# Patient Record
Sex: MALE
[De-identification: names, ages, dates, MRNs, and addresses within clinical notes are randomized per-mention and may not be internally consistent; named-entity substitution may affect disease eponyms.]

---

## 2020-04-14 ENCOUNTER — HOSPITAL ENCOUNTER (INPATIENT)
Dept: HOSPITAL 72 - EMR | Age: 85
LOS: 15 days | Discharge: NURSING, REHAB OR LONG-TERM CARE | DRG: 177 | End: 2020-04-29
Payer: MEDICARE

## 2020-04-14 VITALS — HEIGHT: 65 IN | BODY MASS INDEX: 25.09 KG/M2 | WEIGHT: 150.58 LBS

## 2020-04-14 VITALS — DIASTOLIC BLOOD PRESSURE: 67 MMHG | SYSTOLIC BLOOD PRESSURE: 146 MMHG

## 2020-04-14 VITALS — SYSTOLIC BLOOD PRESSURE: 132 MMHG | DIASTOLIC BLOOD PRESSURE: 70 MMHG

## 2020-04-14 VITALS — SYSTOLIC BLOOD PRESSURE: 138 MMHG | DIASTOLIC BLOOD PRESSURE: 78 MMHG

## 2020-04-14 VITALS — SYSTOLIC BLOOD PRESSURE: 140 MMHG | DIASTOLIC BLOOD PRESSURE: 73 MMHG

## 2020-04-14 VITALS — SYSTOLIC BLOOD PRESSURE: 128 MMHG | DIASTOLIC BLOOD PRESSURE: 68 MMHG

## 2020-04-14 DIAGNOSIS — R06.02: ICD-10-CM

## 2020-04-14 DIAGNOSIS — J44.9: ICD-10-CM

## 2020-04-14 DIAGNOSIS — U07.1: Primary | ICD-10-CM

## 2020-04-14 DIAGNOSIS — N18.6: ICD-10-CM

## 2020-04-14 DIAGNOSIS — E11.9: ICD-10-CM

## 2020-04-14 DIAGNOSIS — D63.1: ICD-10-CM

## 2020-04-14 DIAGNOSIS — I12.0: ICD-10-CM

## 2020-04-14 DIAGNOSIS — M19.90: ICD-10-CM

## 2020-04-14 DIAGNOSIS — N18.9: ICD-10-CM

## 2020-04-14 DIAGNOSIS — E87.5: ICD-10-CM

## 2020-04-14 DIAGNOSIS — I50.32: ICD-10-CM

## 2020-04-14 DIAGNOSIS — Z99.81: ICD-10-CM

## 2020-04-14 DIAGNOSIS — I49.3: ICD-10-CM

## 2020-04-14 DIAGNOSIS — E11.22: ICD-10-CM

## 2020-04-14 LAB
ADD MANUAL DIFF: NO
ALBUMIN SERPL-MCNC: 3.1 G/DL (ref 3.4–5)
ALBUMIN/GLOB SERPL: 0.8 {RATIO} (ref 1–2.7)
ALP SERPL-CCNC: 81 U/L (ref 46–116)
ALT SERPL-CCNC: 42 U/L (ref 12–78)
ANION GAP SERPL CALC-SCNC: 10 MMOL/L (ref 5–15)
AST SERPL-CCNC: 57 U/L (ref 15–37)
BASOPHILS NFR BLD AUTO: 0.4 % (ref 0–2)
BILIRUB SERPL-MCNC: 0.5 MG/DL (ref 0.2–1)
BUN SERPL-MCNC: 34 MG/DL (ref 7–18)
CALCIUM SERPL-MCNC: 9 MG/DL (ref 8.5–10.1)
CHLORIDE SERPL-SCNC: 100 MMOL/L (ref 98–107)
CK MB SERPL-MCNC: 1 NG/ML (ref 0–3.6)
CK SERPL-CCNC: 88 U/L (ref 26–308)
CO2 SERPL-SCNC: 29 MMOL/L (ref 21–32)
CREAT SERPL-MCNC: 6.7 MG/DL (ref 0.55–1.3)
EOSINOPHIL NFR BLD AUTO: 0.2 % (ref 0–3)
ERYTHROCYTE [DISTWIDTH] IN BLOOD BY AUTOMATED COUNT: 13.5 % (ref 11.6–14.8)
GLOBULIN SER-MCNC: 3.8 G/DL
HCT VFR BLD CALC: 30.9 % (ref 42–52)
HGB BLD-MCNC: 10 G/DL (ref 14.2–18)
LYMPHOCYTES NFR BLD AUTO: 13.3 % (ref 20–45)
MCV RBC AUTO: 89 FL (ref 80–99)
MONOCYTES NFR BLD AUTO: 7.1 % (ref 1–10)
NEUTROPHILS NFR BLD AUTO: 79 % (ref 45–75)
PLATELET # BLD: 164 K/UL (ref 150–450)
POTASSIUM SERPL-SCNC: 5.2 MMOL/L (ref 3.5–5.1)
RBC # BLD AUTO: 3.49 M/UL (ref 4.7–6.1)
SODIUM SERPL-SCNC: 139 MMOL/L (ref 136–145)
WBC # BLD AUTO: 7 K/UL (ref 4.8–10.8)

## 2020-04-14 PROCEDURE — 83880 ASSAY OF NATRIURETIC PEPTIDE: CPT

## 2020-04-14 PROCEDURE — 86140 C-REACTIVE PROTEIN: CPT

## 2020-04-14 PROCEDURE — 86706 HEP B SURFACE ANTIBODY: CPT

## 2020-04-14 PROCEDURE — 80048 BASIC METABOLIC PNL TOTAL CA: CPT

## 2020-04-14 PROCEDURE — 82270 OCCULT BLOOD FECES: CPT

## 2020-04-14 PROCEDURE — 80053 COMPREHEN METABOLIC PANEL: CPT

## 2020-04-14 PROCEDURE — 87081 CULTURE SCREEN ONLY: CPT

## 2020-04-14 PROCEDURE — 83550 IRON BINDING TEST: CPT

## 2020-04-14 PROCEDURE — 80061 LIPID PANEL: CPT

## 2020-04-14 PROCEDURE — 82607 VITAMIN B-12: CPT

## 2020-04-14 PROCEDURE — 36600 WITHDRAWAL OF ARTERIAL BLOOD: CPT

## 2020-04-14 PROCEDURE — 99285 EMERGENCY DEPT VISIT HI MDM: CPT

## 2020-04-14 PROCEDURE — 82553 CREATINE MB FRACTION: CPT

## 2020-04-14 PROCEDURE — 82550 ASSAY OF CK (CPK): CPT

## 2020-04-14 PROCEDURE — 85025 COMPLETE CBC W/AUTO DIFF WBC: CPT

## 2020-04-14 PROCEDURE — 85379 FIBRIN DEGRADATION QUANT: CPT

## 2020-04-14 PROCEDURE — 83036 HEMOGLOBIN GLYCOSYLATED A1C: CPT

## 2020-04-14 PROCEDURE — 96375 TX/PRO/DX INJ NEW DRUG ADDON: CPT

## 2020-04-14 PROCEDURE — 36415 COLL VENOUS BLD VENIPUNCTURE: CPT

## 2020-04-14 PROCEDURE — 87635 SARS-COV-2 COVID-19 AMP PRB: CPT

## 2020-04-14 PROCEDURE — 93005 ELECTROCARDIOGRAM TRACING: CPT

## 2020-04-14 PROCEDURE — 82746 ASSAY OF FOLIC ACID SERUM: CPT

## 2020-04-14 PROCEDURE — 71045 X-RAY EXAM CHEST 1 VIEW: CPT

## 2020-04-14 PROCEDURE — 83615 LACTATE (LD) (LDH) ENZYME: CPT

## 2020-04-14 PROCEDURE — 82962 GLUCOSE BLOOD TEST: CPT

## 2020-04-14 PROCEDURE — 84443 ASSAY THYROID STIM HORMONE: CPT

## 2020-04-14 PROCEDURE — 84100 ASSAY OF PHOSPHORUS: CPT

## 2020-04-14 PROCEDURE — 84550 ASSAY OF BLOOD/URIC ACID: CPT

## 2020-04-14 PROCEDURE — 80076 HEPATIC FUNCTION PANEL: CPT

## 2020-04-14 PROCEDURE — 74018 RADEX ABDOMEN 1 VIEW: CPT

## 2020-04-14 PROCEDURE — 82140 ASSAY OF AMMONIA: CPT

## 2020-04-14 PROCEDURE — 96374 THER/PROPH/DIAG INJ IV PUSH: CPT

## 2020-04-14 PROCEDURE — 83605 ASSAY OF LACTIC ACID: CPT

## 2020-04-14 PROCEDURE — 87045 FECES CULTURE AEROBIC BACT: CPT

## 2020-04-14 PROCEDURE — 87324 CLOSTRIDIUM AG IA: CPT

## 2020-04-14 PROCEDURE — 84484 ASSAY OF TROPONIN QUANT: CPT

## 2020-04-14 PROCEDURE — 85007 BL SMEAR W/DIFF WBC COUNT: CPT

## 2020-04-14 PROCEDURE — 82977 ASSAY OF GGT: CPT

## 2020-04-14 PROCEDURE — 82803 BLOOD GASES ANY COMBINATION: CPT

## 2020-04-14 PROCEDURE — 83735 ASSAY OF MAGNESIUM: CPT

## 2020-04-14 PROCEDURE — 82728 ASSAY OF FERRITIN: CPT

## 2020-04-14 PROCEDURE — 83540 ASSAY OF IRON: CPT

## 2020-04-14 PROCEDURE — 87040 BLOOD CULTURE FOR BACTERIA: CPT

## 2020-04-14 NOTE — NUR
ED Nurse Note:

Pt resting in bed, offered food and drink and more blankets, pt denies pain at 
this time, will continue to monitor

## 2020-04-14 NOTE — NUR
RESPIRATORY NOTE:

ABG was not obtained. ER MD notified. results entered was for wrong patient. 
Charge RN notified

## 2020-04-14 NOTE — EMERGENCY ROOM REPORT
History of Present Illness


General


Chief Complaint:  Shortness of breath


Source:  Patient, EMS


 (Kenroy Fields MD)





Present Illness


HPI


Disclaimer: Please note that this report is being documented using DRAGON 

technology. This can lead to erroneous entry secondary to incorrect 

interpretation by the dictating instrument.





HPI: 85-year-old male history of ESRD on hemodialysis, COPD oxygen dependent at 

home presents for evaluation of shortness of breath.  Per EMS, they were called 

by family stated he was having increased work of breathing and shortness of 

breath this morning.  No cough or fevers have been reported.  His son is 

currently in the intensive care unit for COVID-19 infection.  Patient finished 

hemodialysis yesterday after a full treatment.  Denies any swelling.  Currently 

denying any shortness of breath, chest pain, nausea, vomiting, diarrhea.


 


PMH: ESRD, hypertension, COPD


 


PSH: Reviewed


 


Allergies: None reported


 


Social Hx: Denies drug or alcohol use


 (Kenroy Fields MD)


Allergies:  


Coded Allergies:  


     No Known Allergies (Unverified , 4/16/13)





Nursing Documentation-PMH


Hx Cardiac Problems:  No


Hx Hypertension:  Yes


Hx Pacemaker:  No


Hx Asthma:  No


Hx COPD:  No


Hx Diabetes:  Yes


Hx Cancer:  No


Hx Gastrointestinal Problems:  No


Hx Dialysis:  Yes


Hx Neurological Problems:  No


Hx Cerebrovascular Accident:  No


Hx Seizures:  Yes - pt. claims to have had one 35 years ago


Hx Neurologic Surgery:  No


Hx Brain Shunt:  No


 (Kenroy Fields MD)





Review of Systems


All Other Systems:  negative except mentioned in HPI


 (Kenroy Fields MD)





Medical Decision Making


Diagnostic Impression:  


 Primary Impression:  


 Dyspnea


 Additional Impression:  


 ESRD (end stage renal disease)


ER Course


A 5-year-old male history of COPD and ESRD presents for evaluation of shortness 

of breath now resolved.  Differential includes was not limited to viral syndrome

, pneumonia, COVID-19 infection, bronchitis, COPD exacerbation, CHF exacerbation

, fluid overload state, electrolyte abnormality, GERD, ACS, arrhythmia to name 

a few.  Based on the patient's presenting signs, symptoms, recent exposure and 

physical exam findings, the patient has been screened and is suspected to have 

COVID-19.  Will send swabs, obtain blood cultures, x-rays, ABG and broad labs 

to evaluate.


 (Kenroy Fields MD)


ER Course


Please refer to the initial note for the history exam and presentation


At this time patient x-ray does not show any obvious infiltrate there was a 

small marking in the lower lobe





Patient does have renal failure and blood work consistent with that





Patient has had multiple sick contacts with covid-19 given the comorbidities 

and the patient's presentation he requires further inpatient


Care and evaluation





Labs








Test


  4/14/20


13:39


 


White Blood Count


  7.0 K/UL


(4.8-10.8)


 


Red Blood Count


  3.49 M/UL


(4.70-6.10)


 


Hemoglobin


  10.0 G/DL


(14.2-18.0)


 


Hematocrit


  30.9 %


(42.0-52.0)


 


Mean Corpuscular Volume 89 FL (80-99) 


 


Mean Corpuscular Hemoglobin


  28.7 PG


(27.0-31.0)


 


Mean Corpuscular Hemoglobin


Concent 32.4 G/DL


(32.0-36.0)


 


Red Cell Distribution Width


  13.5 %


(11.6-14.8)


 


Platelet Count


  164 K/UL


(150-450)


 


Mean Platelet Volume


  8.7 FL


(6.5-10.1)


 


Neutrophils (%) (Auto)


  79.0 %


(45.0-75.0)


 


Lymphocytes (%) (Auto)


  13.3 %


(20.0-45.0)


 


Monocytes (%) (Auto)


  7.1 %


(1.0-10.0)


 


Eosinophils (%) (Auto)


  0.2 %


(0.0-3.0)


 


Basophils (%) (Auto)


  0.4 %


(0.0-2.0)


 


Sodium Level


  139 MMOL/L


(136-145)


 


Potassium Level


  5.2 MMOL/L


(3.5-5.1)


 


Chloride Level


  100 MMOL/L


()


 


Carbon Dioxide Level


  29 MMOL/L


(21-32)


 


Anion Gap


  10 mmol/L


(5-15)


 


Blood Urea Nitrogen


  34 mg/dL


(7-18)


 


Creatinine


  6.7 MG/DL


(0.55-1.30)


 


Estimat Glomerular Filtration


Rate 8.0 mL/min


(>60)


 


Glucose Level


  137 MG/DL


()


 


Lactic Acid Level


  1.80 mmol/L


(0.4-2.0)


 


Calcium Level


  9.0 MG/DL


(8.5-10.1)


 


Total Bilirubin


  0.5 MG/DL


(0.2-1.0)


 


Aspartate Amino Transf


(AST/SGOT) 57 U/L (15-37) 


 


 


Alanine Aminotransferase


(ALT/SGPT) 42 U/L (12-78) 


 


 


Alkaline Phosphatase


  81 U/L


()


 


Total Creatine Kinase


  88 U/L


()


 


Creatine Kinase MB


  1.0 NG/ML


(0.0-3.6)


 


Creatine Kinase MB Relative


Index 1.1 


 


 


Troponin I


  0.025 ng/mL


(0.000-0.056)


 


Pro-B-Type Natriuretic Peptide


  16742 pg/mL


(0-125)


 


Total Protein


  6.9 G/DL


(6.4-8.2)


 


Albumin


  3.1 G/DL


(3.4-5.0)


 


Globulin 3.8 g/dL 


 


Albumin/Globulin Ratio 0.8 (1.0-2.7) 








 (Van Lopez DO)


EKG Diagnostic Results


EKG Time:  13:49


Rate:  normal


Rhythm:  NSR


Other Impression


Sinus rhythm with left axis deviation and bifascicular block.  No prior 

available for comparison


 (Kenroy Fields MD)





Rhythm Strip Diag. Results


Rhythm Strip Time:  13:49


Rate:  70s


Rhythm:  NSR


 (Kenroy Fields MD)


EP Interpretation:  yes


Rate:  77


Rhythm:  NSR, no PVC's, no ectopy


 (Van Lopez DO)


Chest X-Ray Diagnostic Results


Chest X-Ray Diagnostic Results :  


   Chest X-Ray Ordered:  Yes


   # of Views/Limited/Complete:  1 View


   Indication:  Shortness of Breath


   EP Interpretation:  Yes


   Interpretation:  no consolidation, no effusion, no pneumothorax, other - 

Catheter in left upper chest


   Impression:  No acute disease


   Electronically Signed by:  Electronically signed by Kenroy Fields


 (Kenroy Fields MD)


Chest X-Ray Diagnostic Results :  


   Chest X-Ray Ordered:  Yes


   # of Views/Limited/Complete:  1 View


   Indication:  Shortness of Breath


   EP Interpretation:  Yes


   Interpretation:  no effusion, no pneumothorax, other - Midlung atelectasis


   Impression:  Other - Left midlung atelectasis


   Electronically Signed by:  Van Lopez DO


 (Van Lopez DO)


Reevaluation Time:  14:38


Reevaluation Impression


EKG shows bifascicular block and there is no prior for comparison.  No obvious 

infiltrate on chest x-ray.


 (Kenroy Fields MD)


Status:  improved


 (Van Lopez DO)


Disposition:  ADMITTED AS INPATIENT


Condition:  Serious











Kenroy Fields MD Apr 14, 2020 13:27


Van Lopez DO Apr 14, 2020 15:33

## 2020-04-14 NOTE — NUR
ED Nurse Note:

Dr Rubio notified that pt is unable to provided urine sample, dialysis pt, 
and refuses straight catheter.

## 2020-04-14 NOTE — NUR
ED Nurse Note:

Pt brought in by ambulance for SOB for 2 days. Pt lives at home with son. Son 
is currently in a hospital ICU for COVID. Pt is alert and orientedx4, 02 98% NC 
1L. Pt has fever 100.8F oral. Pt is set up on monitor. Pt has chills and 
nonproductive cough.

## 2020-04-14 NOTE — NUR
ED Nurse Note:

Patient's accu check level 154mg/dl. RN unable to save insulin administration 
without clicking on critical high sugar result.

## 2020-04-14 NOTE — DIAGNOSTIC IMAGING REPORT
Indication: Shortness of breath

 

Technique: One view of the chest

 

Comparison: none

 

Findings: There is a left chest  HERO catheter, tip projected at the level of the

superior vena cava. Some atelectatic bands are seen in the left midlung. The lungs

and pleural spaces are otherwise clear. The heart size is upper limits of normal. A

stent is seen in the left axilla

 

Impression: Left midlung atelectasis. No acute process otherwise

 

Other findings as noted

## 2020-04-15 VITALS — SYSTOLIC BLOOD PRESSURE: 114 MMHG | DIASTOLIC BLOOD PRESSURE: 45 MMHG

## 2020-04-15 VITALS — SYSTOLIC BLOOD PRESSURE: 114 MMHG | DIASTOLIC BLOOD PRESSURE: 49 MMHG

## 2020-04-15 VITALS — DIASTOLIC BLOOD PRESSURE: 56 MMHG | SYSTOLIC BLOOD PRESSURE: 126 MMHG

## 2020-04-15 VITALS — SYSTOLIC BLOOD PRESSURE: 115 MMHG | DIASTOLIC BLOOD PRESSURE: 53 MMHG

## 2020-04-15 VITALS — DIASTOLIC BLOOD PRESSURE: 54 MMHG | SYSTOLIC BLOOD PRESSURE: 125 MMHG

## 2020-04-15 LAB
% IRON SATURATION: 18 % (ref 15–50)
ADD MANUAL DIFF: NO
ALBUMIN SERPL-MCNC: 3 G/DL (ref 3.4–5)
ALBUMIN/GLOB SERPL: 0.8 {RATIO} (ref 1–2.7)
ALP SERPL-CCNC: 82 U/L (ref 46–116)
ALT SERPL-CCNC: 46 U/L (ref 12–78)
AMMONIA PLAS-SCNC: 33 UMOL/L (ref 11–32)
ANION GAP SERPL CALC-SCNC: 12 MMOL/L (ref 5–15)
AST SERPL-CCNC: 39 U/L (ref 15–37)
BASOPHILS NFR BLD AUTO: 0.6 % (ref 0–2)
BILIRUB SERPL-MCNC: 0.7 MG/DL (ref 0.2–1)
BUN SERPL-MCNC: 39 MG/DL (ref 7–18)
CALCIUM SERPL-MCNC: 8.9 MG/DL (ref 8.5–10.1)
CHLORIDE SERPL-SCNC: 101 MMOL/L (ref 98–107)
CHOLEST SERPL-MCNC: 145 MG/DL (ref ?–200)
CO2 SERPL-SCNC: 26 MMOL/L (ref 21–32)
CREAT SERPL-MCNC: 8 MG/DL (ref 0.55–1.3)
EOSINOPHIL NFR BLD AUTO: 0.7 % (ref 0–3)
ERYTHROCYTE [DISTWIDTH] IN BLOOD BY AUTOMATED COUNT: 13.3 % (ref 11.6–14.8)
FERRITIN SERPL-MCNC: > 2000 NG/ML (ref 8–388)
GAMMA GLUTAMYL TRANSPEPTIDASE: 37 U/L (ref 5–85)
GLOBULIN SER-MCNC: 3.9 G/DL
HCT VFR BLD CALC: 32.1 % (ref 42–52)
HDLC SERPL-MCNC: 65 MG/DL (ref 40–60)
HGB BLD-MCNC: 10.5 G/DL (ref 14.2–18)
IRON SERPL-MCNC: 19 UG/DL (ref 50–175)
LYMPHOCYTES NFR BLD AUTO: 16.8 % (ref 20–45)
MCV RBC AUTO: 89 FL (ref 80–99)
MONOCYTES NFR BLD AUTO: 4.4 % (ref 1–10)
NEUTROPHILS NFR BLD AUTO: 77.5 % (ref 45–75)
PHOSPHATE SERPL-MCNC: 3.1 MG/DL (ref 2.5–4.9)
PLATELET # BLD: 180 K/UL (ref 150–450)
POTASSIUM SERPL-SCNC: 4.8 MMOL/L (ref 3.5–5.1)
RBC # BLD AUTO: 3.61 M/UL (ref 4.7–6.1)
SODIUM SERPL-SCNC: 139 MMOL/L (ref 136–145)
TIBC SERPL-MCNC: 108 UG/DL (ref 250–450)
TRIGL SERPL-MCNC: 125 MG/DL (ref 30–150)
UNSATURATED IRON BINDING: 89 UG/DL (ref 112–346)
WBC # BLD AUTO: 6.6 K/UL (ref 4.8–10.8)

## 2020-04-15 PROCEDURE — 5A1D70Z PERFORMANCE OF URINARY FILTRATION, INTERMITTENT, LESS THAN 6 HOURS PER DAY: ICD-10-PCS

## 2020-04-15 RX ADMIN — SEVELAMER CARBONATE SCH MG: 800 POWDER, FOR SUSPENSION ORAL at 17:16

## 2020-04-15 RX ADMIN — Medication SCH TAB: at 09:44

## 2020-04-15 RX ADMIN — SEVELAMER CARBONATE SCH MG: 800 POWDER, FOR SUSPENSION ORAL at 12:14

## 2020-04-15 RX ADMIN — DOCUSATE SODIUM SCH MG: 100 CAPSULE, LIQUID FILLED ORAL at 17:16

## 2020-04-15 RX ADMIN — INSULIN ASPART SCH UNITS: 100 INJECTION, SOLUTION INTRAVENOUS; SUBCUTANEOUS at 16:30

## 2020-04-15 RX ADMIN — SEVELAMER CARBONATE SCH MG: 800 POWDER, FOR SUSPENSION ORAL at 09:00

## 2020-04-15 RX ADMIN — DOCUSATE SODIUM SCH MG: 100 CAPSULE, LIQUID FILLED ORAL at 09:44

## 2020-04-15 RX ADMIN — CINACALCET HYDROCHLORIDE SCH MG: 30 TABLET, COATED ORAL at 09:44

## 2020-04-15 RX ADMIN — INSULIN ASPART SCH UNITS: 100 INJECTION, SOLUTION INTRAVENOUS; SUBCUTANEOUS at 06:30

## 2020-04-15 RX ADMIN — INSULIN ASPART SCH UNITS: 100 INJECTION, SOLUTION INTRAVENOUS; SUBCUTANEOUS at 11:30

## 2020-04-15 RX ADMIN — INSULIN ASPART SCH UNITS: 100 INJECTION, SOLUTION INTRAVENOUS; SUBCUTANEOUS at 21:00

## 2020-04-15 NOTE — NUR
NURSE NOTES:

Recvd report. Pt was transferred from [facility] at change of shift. Pt is awake and alert AOX4, 
Yi speaking. Pt has cardiac monitor on. Pt is on room air with no sign of sob or resp 
distress. IV site is c/d/i.  Pt has left arm fistula. Bed in lowest locked position, call 
light within reach, will continue with plan of care

## 2020-04-15 NOTE — NEPHROLOGY PROGRESS NOTE
Assessment/Plan


Problem List:  


(1) ESRD (end stage renal disease)


(2) Suspected COVID-19 virus infection


Assessment





End-stage renal disease on hemodialysis


Has arm fistula for dialysis-Next dialysis Wednesday, April 15


Dyspnea and shortness of breath, history of COPD, oxygen dependent at home


Exposure to COVID 19


Diabetes mellitus


Hypertension


Remote history of seizure


Anemia of kidney disease


Plan





Hemodialysis today


Keep the blood pressure and blood sugar in check


2D echocardiogram


Antibiotics and pulmonary support per consultants





Chest x-ray:


Findings: There is a left chest  HERO catheter, tip projected at the level of 

the


superior vena cava. Some atelectatic bands are seen in the left midlung. The 

lungs


and pleural spaces are otherwise clear. The heart size is upper limits of 

normal. A


stent is seen in the left axilla


Impression: Left midlung atelectasis. No acute process otherwise





Subjective


ROS Limited/Unobtainable:  No


Constitutional:  Reports: malaise, weakness





Objective


Objective





Last 24 Hour Vital Signs








  Date Time  Temp Pulse Resp B/P (MAP) Pulse Ox O2 Delivery O2 Flow Rate FiO2


 


4/15/20 09:00  65  114/49    


 


4/15/20 08:54      Room Air  


 


4/15/20 08:00 98.4 65 20 114/49 (70) 100   


 


4/15/20 08:00  58      


 


4/15/20 04:00 98.1 61 19 125/54 (77) 96   


 


4/15/20 04:00  60      


 


4/15/20 01:37      Room Air  


 


4/15/20 00:15  61      


 


4/15/20 00:15 99.0 60 14 128/68 97 Room Air  97


 


4/14/20 23:00 99.0 60 14 128/68 97 Room Air  


 


4/14/20 21:00 99.0 58 14 132/70 96 Room Air  


 


4/14/20 18:59 99.9 70 19 138/78 100 Room Air  


 


4/14/20 16:30 99.9 71 16 140/73 99 Room Air  


 


4/14/20 14:18 99.9       


 


4/14/20 13:45  70 18   Room Air  97


 


4/14/20 13:45 100.8 70 18 146/67 97 Room Air  


 


4/14/20 13:14 100.8 68 16 161/68 (99) 95 Room Air  

















Intake and Output  


 


 4/14/20 4/15/20





 19:00 07:00


 


Intake Total 0 ml 


 


Balance 0 ml 


 


  


 


Intake Oral 0 ml 








Laboratory Tests


4/14/20 13:22: 


Arterial Blood pH , Arterial Blood Partial Pressure CO2 , Arterial Blood 

Partial Pressure O2 , Arterial Blood HCO3 , Arterial Blood Oxygen Saturation , 

Arterial Blood Base Excess , Aris Test 


4/14/20 13:39: 


White Blood Count 7.0, Red Blood Count 3.49L, Hemoglobin 10.0L, Hematocrit 30.9L

, Mean Corpuscular Volume 89, Mean Corpuscular Hemoglobin 28.7, Mean 

Corpuscular Hemoglobin Concent 32.4, Red Cell Distribution Width 13.5, Platelet 

Count 164, Mean Platelet Volume 8.7, Neutrophils (%) (Auto) 79.0H, Lymphocytes (

%) (Auto) 13.3L, Monocytes (%) (Auto) 7.1, Eosinophils (%) (Auto) 0.2, 

Basophils (%) (Auto) 0.4, Sodium Level 139, Potassium Level 5.2H, Chloride 

Level 100, Carbon Dioxide Level 29, Anion Gap 10, Blood Urea Nitrogen 34H, 

Creatinine 6.7H, Estimat Glomerular Filtration Rate 8.0, Glucose Level 137H, 

Lactic Acid Level 1.80, Calcium Level 9.0, Total Bilirubin 0.5, Aspartate Amino 

Transf (AST/SGOT) 57H, Alanine Aminotransferase (ALT/SGPT) 42, Alkaline 

Phosphatase 81, Lactate Dehydrogenase 332H, Total Creatine Kinase 88, Creatine 

Kinase MB 1.0, Creatine Kinase MB Relative Index 1.1, Troponin I 0.025, Pro-B-

Type Natriuretic Peptide 94233A, Total Protein 6.9, Albumin 3.1L, Globulin 3.8, 

Albumin/Globulin Ratio 0.8L


4/15/20 08:45: 


White Blood Count 6.6, Red Blood Count 3.61L, Hemoglobin 10.5L, Hematocrit 32.1L

, Mean Corpuscular Volume 89, Mean Corpuscular Hemoglobin 29.0, Mean 

Corpuscular Hemoglobin Concent 32.6, Red Cell Distribution Width 13.3, Platelet 

Count 180, Mean Platelet Volume 8.5, Neutrophils (%) (Auto) 77.5H, Lymphocytes (

%) (Auto) 16.8L, Monocytes (%) (Auto) 4.4, Eosinophils (%) (Auto) 0.7, 

Basophils (%) (Auto) 0.6, Sodium Level 139, Potassium Level 4.8, Chloride Level 

101, Carbon Dioxide Level 26, Anion Gap 12, Blood Urea Nitrogen 39H, Creatinine 

8.0H, Estimat Glomerular Filtration Rate 6.5, Glucose Level 123H, Lactic Acid 

Level 1.10, Calcium Level 8.9, Total Bilirubin 0.7, Aspartate Amino Transf (AST/

SGOT) 39H, Alanine Aminotransferase (ALT/SGPT) 46, Alkaline Phosphatase 82, 

Troponin I 0.030, Pro-B-Type Natriuretic Peptide 22429Z, Total Protein 6.9, 

Albumin 3.0L, Globulin 3.9, Albumin/Globulin Ratio 0.8L, D-Dimer 1.36H, 

Hemoglobin A1c 5.9, Uric Acid 5.3, Phosphorus Level 3.1, Magnesium Level 2.1, 

Iron Level 19L, Total Iron Binding Capacity 108L, Percent Iron Saturation 18, 

Unsaturated Iron Binding 89L, Ferritin > 2000H, Gamma Glutamyl Transpeptidase 37

, Ammonia 33H, C-Reactive Protein, Quantitative 18.4H, Triglycerides Level 125, 

Cholesterol Level 145, LDL Cholesterol 52, HDL Cholesterol 65H, Cholesterol/HDL 

Ratio 2.2L, Vitamin B12 Level 975, Folate 45.8, Thyroid Stimulating Hormone (TSH

) 0.311L


Height (Feet):  5


Height (Inches):  5.00


Weight (Pounds):  156


General Appearance:  no apparent distress, lethargic


Cardiovascular:  normal rate


Respiratory/Chest:  decreased breath sounds











Vik Morrison MD Apr 15, 2020 12:01

## 2020-04-15 NOTE — NUR
CASE MANAGEMENT:REVIEW





84 YR OLD MALE BIBA FROM HOME



CC; SOB. EXPOSED TO [facility] WHO IS COVID 19 POSITIVE



PMH: ESRD ON HD. DM. HTN



SI: SUSPECTED COVID 19. HYPERKALEMIA

100.7    68  16  161/68  95% ON RA

K+5.2   BUN+34   CR+6.7



IS: TYLENOL PO 

IV CA GLUC X1

IV INSULIN X1

VI D50W

COVID 19 

CXR

BLOOD CX

**: TO TELEMETRY 

DCP: FROM HOME

## 2020-04-15 NOTE — NUR
NURSE NOTES:

RECEIVED REPORT FROM NI KAUR. PATIENT AWAKE, ALERT, OX4, Egyptian-SPEAKING. PATIENT 
TRANSFERRED TO BED WITHOUT INCIDENT. GOWN CHANGED, PLACED ON CARDIAC MONITOR. NO COMPLAINTS 
OF PAIN OR DISCOMFORT. BREATHING IS EVEN AND UNLABORED ON ROOM AIR, NO S/SX OF DISTRESS. PER 
PATIENT HE USES OXYGEN AT HOME INTERMITTENTLY, BUT IS OKAY WITHOUT SUPPLEMENTAL OXYGEN USE 
RIGHT NOW. WENT OVER BELONGINGS WITH PATIENT- HAS A BLACK IPHONE AT BEDSIDE. PER PATIENT HE 
AMBULATES WITHOUT ASSISTANCE OF DEVICE, BUT INFORMED PATIENT TO USE CALL LIGHT FOR 
ASSISTANCE TO ASSESS GAIT STEADINESS. RECEIVED ADMISSION ORDERS FROM DR. ROJO. NOTED AND 
CARRIED OUT. BED LOCKED AND IN LOWEST POSITION, SIDERAILS UP X 2. CALL LIGHT WITHIN REACH. 
WILL CONTINUE TO MONITOR PATIENT FOR ANY CHANGES.

## 2020-04-15 NOTE — NUR
NURSE NOTES:

Received report from NI Macias. Patient is on bed, awake, alert and oriented x 4. No acute 
signs and symptoms of distress noted at this time. Patient is on room air and no respiratory 
distress reported. Cardiac monitor is on shows sinus rhythm. IV is on right hand g-20, that 
is dry and intact. Patient is on renal diet-instructed. Safety measures are in placed. Call 
light and bedside table within reach, bed in low and locked position, side rails up x 2. Bed 
alarm is on. Encouraged to call for any assistance needed. Will continue plan of care.

## 2020-04-15 NOTE — NUR
TRANSFER TO FLOOR:

Patient transferred to  as ordered, per Dr Silva.   Report given to NI aVzquez.  
Belongings and medications given to

. Family and or S/O informed of transfer.

## 2020-04-15 NOTE — CONSULTATION
History of Present Illness


General


Chief Complaint:  Dyspnea/Respdistress





Present Illness


Allergies:  


Coded Allergies:  


     No Known Allergies (Unverified , 4/16/13)





Medication History


Scheduled


Cephalexin* (Keflex*), 250 MG PO Q6HR


Cinacalcet* (Sensipar*), 30 MG ORAL DAILY, (Reported)


Labetalol Hcl* (Normodyne*), 100 MG ORAL DAILY, (Reported)


Sevelamer Carbonate* (Renvela*), 800 MG ORAL THREE TIMES A DAY, (Reported)


Vitamin B Cmplx/Vit C/Folic AC (Nephro-Makenzie Tablet), 1 TAB ORAL DAILY, (Reported

)





Patient History


Healthcare decision maker





Resuscitation status





Advanced Directive on File








Physical Exam





Last 24 Hour Vital Signs








  Date Time  Temp Pulse Resp B/P (MAP) Pulse Ox O2 Delivery O2 Flow Rate FiO2


 


4/15/20 12:00 98.5 66 20 115/53 (73) 100   


 


4/15/20 09:00  65  114/49    


 


4/15/20 08:54      Room Air  


 


4/15/20 08:00 98.4 65 20 114/49 (70) 100   


 


4/15/20 08:00  58      


 


4/15/20 04:00 98.1 61 19 125/54 (77) 96   


 


4/15/20 04:00  60      


 


4/15/20 01:37      Room Air  


 


4/15/20 00:15  61      


 


4/15/20 00:15 99.0 60 14 128/68 97 Room Air  97


 


4/14/20 23:00 99.0 60 14 128/68 97 Room Air  


 


4/14/20 21:00 99.0 58 14 132/70 96 Room Air  


 


4/14/20 18:59 99.9 70 19 138/78 100 Room Air  


 


4/14/20 16:30 99.9 71 16 140/73 99 Room Air  


 


4/14/20 14:18 99.9       


 


4/14/20 13:45  70 18   Room Air  97


 


4/14/20 13:45 100.8 70 18 146/67 97 Room Air  


 


4/14/20 13:14 100.8 68 16 161/68 (99) 95 Room Air  

















Intake and Output  


 


 4/14/20 4/15/20





 18:59 06:59


 


Intake Total 0 ml 


 


Balance 0 ml 


 


  


 


Intake Oral 0 ml 











Laboratory Tests








Test


  4/14/20


13:22 4/14/20


13:39 4/15/20


08:45


 


Arterial Blood pH


  (7.350-7.450)


  


  


 


 


Arterial Blood Partial


Pressure CO2 mmHg


(35.0-45.0) 


  


 


 


Arterial Blood Partial


Pressure O2 mmHg


(75.0-100.0) 


  


 


 


Arterial Blood HCO3


  mmol/L


(22.0-26.0) 


  


 


 


Arterial Blood Oxygen


Saturation  % ()  


  


  


 


 


Arterial Blood Base Excess  (-2-2)    


 


Aris Test     


 


White Blood Count


  


  7.0 K/UL


(4.8-10.8) 6.6 K/UL


(4.8-10.8)


 


Red Blood Count


  


  3.49 M/UL


(4.70-6.10)  L 3.61 M/UL


(4.70-6.10)  L


 


Hemoglobin


  


  10.0 G/DL


(14.2-18.0)  L 10.5 G/DL


(14.2-18.0)  L


 


Hematocrit


  


  30.9 %


(42.0-52.0)  L 32.1 %


(42.0-52.0)  L


 


Mean Corpuscular Volume  89 FL (80-99)   89 FL (80-99)  


 


Mean Corpuscular Hemoglobin


  


  28.7 PG


(27.0-31.0) 29.0 PG


(27.0-31.0)


 


Mean Corpuscular Hemoglobin


Concent 


  32.4 G/DL


(32.0-36.0) 32.6 G/DL


(32.0-36.0)


 


Red Cell Distribution Width


  


  13.5 %


(11.6-14.8) 13.3 %


(11.6-14.8)


 


Platelet Count


  


  164 K/UL


(150-450) 180 K/UL


(150-450)


 


Mean Platelet Volume


  


  8.7 FL


(6.5-10.1) 8.5 FL


(6.5-10.1)


 


Neutrophils (%) (Auto)


  


  79.0 %


(45.0-75.0)  H 77.5 %


(45.0-75.0)  H


 


Lymphocytes (%) (Auto)


  


  13.3 %


(20.0-45.0)  L 16.8 %


(20.0-45.0)  L


 


Monocytes (%) (Auto)


  


  7.1 %


(1.0-10.0) 4.4 %


(1.0-10.0)


 


Eosinophils (%) (Auto)


  


  0.2 %


(0.0-3.0) 0.7 %


(0.0-3.0)


 


Basophils (%) (Auto)


  


  0.4 %


(0.0-2.0) 0.6 %


(0.0-2.0)


 


Sodium Level


  


  139 MMOL/L


(136-145) 139 MMOL/L


(136-145)


 


Potassium Level


  


  5.2 MMOL/L


(3.5-5.1)  H 4.8 MMOL/L


(3.5-5.1)


 


Chloride Level


  


  100 MMOL/L


() 101 MMOL/L


()


 


Carbon Dioxide Level


  


  29 MMOL/L


(21-32) 26 MMOL/L


(21-32)


 


Anion Gap


  


  10 mmol/L


(5-15) 12 mmol/L


(5-15)


 


Blood Urea Nitrogen


  


  34 mg/dL


(7-18)  H 39 mg/dL


(7-18)  H


 


Creatinine


  


  6.7 MG/DL


(0.55-1.30)  H 8.0 MG/DL


(0.55-1.30)  H


 


Estimat Glomerular Filtration


Rate 


  8.0 mL/min


(>60) 6.5 mL/min


(>60)


 


Glucose Level


  


  137 MG/DL


()  H 123 MG/DL


()  H


 


Lactic Acid Level


  


  1.80 mmol/L


(0.4-2.0) 1.10 mmol/L


(0.4-2.0)


 


Calcium Level


  


  9.0 MG/DL


(8.5-10.1) 8.9 MG/DL


(8.5-10.1)


 


Total Bilirubin


  


  0.5 MG/DL


(0.2-1.0) 0.7 MG/DL


(0.2-1.0)


 


Aspartate Amino Transf


(AST/SGOT) 


  57 U/L (15-37)


H 39 U/L (15-37)


H


 


Alanine Aminotransferase


(ALT/SGPT) 


  42 U/L (12-78)


  46 U/L (12-78)


 


 


Alkaline Phosphatase


  


  81 U/L


() 82 U/L


()


 


Lactate Dehydrogenase


  


  332 U/L


()  H 


 


 


Total Creatine Kinase


  


  88 U/L


() 


 


 


Creatine Kinase MB


  


  1.0 NG/ML


(0.0-3.6) 


 


 


Creatine Kinase MB Relative


Index 


  1.1  


  


 


 


Troponin I


  


  0.025 ng/mL


(0.000-0.056) 0.030 ng/mL


(0.000-0.056)


 


Pro-B-Type Natriuretic Peptide


  


  34748 pg/mL


(0-125)  H 29443 pg/mL


(0-125)  H


 


Total Protein


  


  6.9 G/DL


(6.4-8.2) 6.9 G/DL


(6.4-8.2)


 


Albumin


  


  3.1 G/DL


(3.4-5.0)  L 3.0 G/DL


(3.4-5.0)  L


 


Globulin  3.8 g/dL   3.9 g/dL  


 


Albumin/Globulin Ratio


  


  0.8 (1.0-2.7)


L 0.8 (1.0-2.7)


L


 


D-Dimer


  


  


  1.36 mg/L FEU


(0.00-0.49)  H


 


Hemoglobin A1c


  


  


  5.9 %


(4.3-6.0)


 


Uric Acid


  


  


  5.3 MG/DL


(2.6-7.2)


 


Phosphorus Level


  


  


  3.1 MG/DL


(2.5-4.9)


 


Magnesium Level


  


  


  2.1 MG/DL


(1.8-2.4)


 


Iron Level


  


  


  19 ug/dL


()  L


 


Total Iron Binding Capacity


  


  


  108 ug/dL


(250-450)  L


 


Percent Iron Saturation   18 % (15-50)  


 


Unsaturated Iron Binding


  


  


  89 ug/dL


(112-346)  L


 


Ferritin


  


  


  > 2000 NG/ML


(8-388)  H


 


Gamma Glutamyl Transpeptidase   37 U/L (5-85)  


 


Ammonia


  


  


  33 umol/L


(11-32)  H


 


C-Reactive Protein,


Quantitative 


  


  18.4 mg/dL


(0.00-0.90)  H


 


Triglycerides Level


  


  


  125 MG/DL


()


 


Cholesterol Level


  


  


  145 MG/DL (<


200)


 


LDL Cholesterol


  


  


  52 mg/dL


(<100)


 


HDL Cholesterol


  


  


  65 MG/DL


(40-60)  H


 


Cholesterol/HDL Ratio


  


  


  2.2 (3.3-4.4)


L


 


Vitamin B12 Level


  


  


  975 PG/ML


(193-986)


 


Folate


  


  


  45.8 NG/ML


(8.6-58.9)


 


Thyroid Stimulating Hormone


(TSH) 


  


  0.311 uiU/mL


(0.358-3.740)








Height (Feet):  5


Height (Inches):  5.00


Weight (Pounds):  156


Medications





Current Medications








 Medications


  (Trade)  Dose


 Ordered  Sig/Eliazar


 Route


 PRN Reason  Start Time


 Stop Time Status Last Admin


Dose Admin


 


 Acetaminophen


  (Tylenol)  500 mg  Q6H  PRN


 ORAL


 Temp >100.5  4/15/20 01:00


 5/15/20 00:59   


 


 


 Cinacalcet


  (Sensipar)  30 mg  DAILY


 ORAL


   4/15/20 09:00


 7/14/20 08:59  4/15/20 09:44


 


 


 Dextrose


  (Dextrose 50%)  25 ml  Q30M  PRN


 IV


 Hypoglycemia  4/15/20 01:15


 7/14/20 01:14   


 


 


 Dextrose


  (Dextrose 50%)  50 ml  Q30M  PRN


 IV


 Hypoglycemia  4/15/20 01:15


 7/14/20 01:14   


 


 


 Docusate Sodium


  (Colace)  100 mg  TWICE A  DAY


 ORAL


   4/15/20 09:00


 5/15/20 08:59  4/15/20 09:44


 


 


 Hydralazine HCl


  (Apresoline)  25 mg  Q4H  PRN


 ORAL


 bp over 160 syst  4/14/20 19:45


 7/13/20 19:44   


 


 


 Insulin Aspart


  (NovoLOG)    BEFORE MEALS AND  HS


 SUBQ


   4/15/20 06:30


 7/14/20 06:29   


 


 


 Labetalol HCl


  (Normodyne)  100 mg  DAILY


 ORAL


   4/15/20 09:00


 5/15/20 08:59   


 


 


 Pantoprazole


  (Protonix)  40 mg  Q12HR


 ORAL


   4/14/20 21:00


 5/14/20 20:59  4/15/20 09:44


 


 


 Sevelamer


 Carbonate


  (Renvela)  800 mg  THREE TIMES A  DAY


 ORAL


   4/15/20 09:00


 7/14/20 08:59   


 


 


 Vitamin B Complex/


 Vit C/Folic Acid


  (Nephrovite)  1 tab  DAILY


 ORAL


   4/15/20 09:00


 5/15/20 08:59  4/15/20 09:44


 











Assessment/Plan


Assessment/Plan:


Hematology Consultation





REQ MD: Van Kelley


RFC: Elevated Ddimer, r/o dvt


DOS: 4/15/2020





HPI: 85-year-old male history of ESRD on hemodialysis, COPD oxygen dependent at 

home presents for evaluation of shortness of breath.  Per EMS, they were called 

by family stated he was having increased work of breathing and shortness of 

breath this morning.  No cough or fevers have been reported.  His son is 

currently in the intensive care unit for COVID-19 infection.  Patient finished 

hemodialysis yesterday after a full treatment.  Denies any swelling.  Currently 

denying any shortness of breath, chest pain, nausea, vomiting, diarrhea.





Labs have been reviewed, with elev alexandr, has been seen by renal for hd


 


PMH: ESRD, hypertension, COPD


 


PSH: Reviewed


 


Allergies: None reported


 


Social Hx: Denies drug or alcohol use





Allergies


     No Known Allergies (Unverified , 4/16/13)





Nursing Documentation-PMH


Hx Cardiac Problems:  No


Hx Hypertension:  Yes


Hx Pacemaker:  No


Hx Asthma:  No


Hx COPD:  No


Hx Diabetes:  Yes


Hx Cancer:  No


Hx Gastrointestinal Problems:  No


Hx Dialysis:  Yes


Hx Neurological Problems:  No


Hx Cerebrovascular Accident:  No


Hx Seizures:  Yes - pt. claims to have had one 35 years ago


Hx Neurologic Surgery:  No


Hx Brain Shunt:  No





Review of Systems negative besides as in ros





Physical Exam


Vitals: reviewed


General: NAD


HEENT: nc, at


Neck: supple


Chest: clear breath sounds bilaterally


Cardiovascular: RRR, no s3, s4


Neuro: alert and oriented





Labs: reviewed





Imaging: noted





Asses/Recs


# Elevated D-dimer on admission, with sob, in this case r/o dvt of lower ext


--> less likely pe, first r/o dvt given more likely viral cause possible


--> obtain duplex of lower ext


--> repeat in future in 3mo


# Anemia of chronic disease due to underlying chronic medical issues, 

multifactorial v Gi bleed 


--> Anemia workup has been ordered, rule out gi bleed 


--> No evidence of hemolysis is noted, peripheral smear has been reviewed.


--> Hgb goal >7. Transfuse prn.


--> Epogen or iron at this time is not particularly indicated


--> Medications have been reviewed


--> low threshold for gi evaluation in case has occult +


# Dyspnea


--> appears likely covid related


# ESRD (end stage renal disease)


--> hd as per Dr. Morrison


# Seizures.


# Hypertension.


# Diabetes mellitus.





The timing of this note does not necessarily reflect the time of the patient 

was seen.





Greatly appreciate consultation.











Mt Staley MD Apr 15, 2020 13:12

## 2020-04-15 NOTE — CONSULTATION
DATE OF CONSULTATION:  04/15/2020

INFECTIOUS DISEASES CONSULTATION



CONSULTING PHYSICIAN:  Jake Pfeiffer MD.



REASON FOR CONSULTATION:  Fever, rule out of COVID-19.



HISTORY OF PRESENT ILLNESS:  The patient is an 84-year-old  male

admitted yesterday with shortness of breath.  The patient has contact with

the patient with COVID-19, had fever of 100.8 in the ER, the patient had

leukocytosis and end-stage renal disease.



PAST MEDICAL HISTORY:  End-stage renal disease on hemodialysis, COPD

occasionally get oxygen at home, diabetes mellitus, anemia.



ALLERGIES:  No known drug allergies.



MEDICATIONS:  Renvela, Colace, Sensipar, Nephro-Makenzie, NovoLog insulin,

hydralazine.



SOCIAL HISTORY:  Single.  Denies drug and alcohol abuse.



REVIEW OF SYSTEMS:  The patient denies any fever, shortness of breath,

coughing.  He has no pain.



PHYSICAL EXAMINATION:

VITAL SIGNS:  Temperature 98.4, pulse 65, blood pressure 114/49.

GENERAL APPEARANCE:  No acute distress, comfortable without oxygen.

HEAD AND NECK:  Pink conjunctiva.

HEART:  Normal rate.

LUNGS:  Clear.

ABDOMEN:  Soft and nontender.

EXTREMITIES:  No edema.

NEUROLOGIC:  Awake, alert, verbal.  No focal weakness.



LABORATORY AND DIAGNOSTIC DATA:  WBC 6.6, hemoglobin 10.5, hematocrit 32,

platelet 118.  Sodium 139, potassium 5.2, chloride 100, bicarbonate 29,

BUN 34, creatinine 6.7, glucose 137.  BNP is elevated  .  Chest

x-ray showed left mid lung atelectasis, no acute process.



IMPRESSION:

1. Fever.

2. History of contact with COVID-19 patient, we will try to rule out

COVID-19 disease.

3. COPD.

4. End-stage renal disease on hemodialysis.

5. Diabetes mellitus.

6. Hypertension.

7. Anemia.



RECOMMENDATION:  We will observe off antibiotic.  We will follow up chest

x-ray and we will follow COVID-19 tests.



At the end of my exam, I thank Dr. Silva, for involving me in the care

of this patient.









  ______________________________________________

  Jake Pfeiffer M.D. DR:  Adelaida

D:  04/15/2020 09:56

T:  04/15/2020 16:18

JOB#:  [id number]

CC:



ARMIDA

## 2020-04-15 NOTE — CONSULTATION
DATE OF CONSULTATION:  04/15/2020

PULMONARY CONSULTATION



CONSULTING PHYSICIAN:  Oh Solomon M.D.



HISTORY OF PRESENT ILLNESS:  This is an 84-year-old male with a history of

ESRD on dialysis, who came to the hospital with shortness of breath.

Patient also has chronic underlying lung disease/COPD and is oxygen

dependent.  Patient reports shortness of breath.  He was seen and admitted

to the hospital.  Apparently, patient's son is in ICU with COVID-19

infection.  Patient was seen and admitted to the telemetry unit.



PAST MEDICAL HISTORY:  Notable for ESRD on dialysis, seizure disorder,

diabetes mellitus.



MEDICATIONS:  His list of medications include Sensipar, hydralazine,

labetalol, insulin sliding scale, Nephro-Makenzie, Protonix, Renvela.



REVIEW OF SYSTEMS:  Denies any headaches, hematemesis, melena,

hematochezia, night sweats, or weight loss.



PHYSICAL EXAMINATION:

GENERAL:  Reveals an 84-year-old male.

HEENT:  Unremarkable.

CHEST:  Diminished breath sounds bilaterally with normal heart sounds.

ABDOMEN:  Soft.

EXTREMITIES:  There is no edema.

VITAL SIGNS:  Blood pressure 114/50, heart rate is 62, respirations are 20,

O2 saturation 97% on room air.



LABORATORY DATA:  Lab testing shows hemoglobin 10.5, otherwise normal CBC

and BMP.  Creatinine is 8.0.  Ferritin more than 2000.  .  CRP 18.

ProBNP 52642.  Albumin 3.  TSH 0.3.



IMAGING STUDIES:  X-ray chest was reviewed, which shows evidence for left

atelectasis.  There is a left chest catheter in place.



IMPRESSION:

1. History of COVID-19 exposure.

2. ESRD, on dialysis.

3. Diabetes mellitus.

4. Dyspnea.



DISCUSSION:  It is prudent to admit the patient and watch carefully.

Consider use of Plaquenil or azithromycin only if patient becomes hypoxic.

Currently, he is doing well.  We would avoid unnecessary antibiotics.

Agree with dialysis.  We will follow carefully.









  ______________________________________________

  Oh Solomon M.D.





DR:  GREGORIO

D:  04/15/2020 11:34

T:  04/15/2020 18:38

JOB#:  [id number]

CC:

## 2020-04-16 VITALS — SYSTOLIC BLOOD PRESSURE: 121 MMHG | DIASTOLIC BLOOD PRESSURE: 51 MMHG

## 2020-04-16 VITALS — DIASTOLIC BLOOD PRESSURE: 57 MMHG | SYSTOLIC BLOOD PRESSURE: 107 MMHG

## 2020-04-16 VITALS — SYSTOLIC BLOOD PRESSURE: 134 MMHG | DIASTOLIC BLOOD PRESSURE: 63 MMHG

## 2020-04-16 VITALS — SYSTOLIC BLOOD PRESSURE: 126 MMHG | DIASTOLIC BLOOD PRESSURE: 51 MMHG

## 2020-04-16 VITALS — DIASTOLIC BLOOD PRESSURE: 59 MMHG | SYSTOLIC BLOOD PRESSURE: 119 MMHG

## 2020-04-16 VITALS — DIASTOLIC BLOOD PRESSURE: 66 MMHG | SYSTOLIC BLOOD PRESSURE: 110 MMHG

## 2020-04-16 RX ADMIN — INSULIN ASPART SCH UNITS: 100 INJECTION, SOLUTION INTRAVENOUS; SUBCUTANEOUS at 16:30

## 2020-04-16 RX ADMIN — SEVELAMER CARBONATE SCH MG: 800 POWDER, FOR SUSPENSION ORAL at 09:35

## 2020-04-16 RX ADMIN — SEVELAMER CARBONATE SCH MG: 800 POWDER, FOR SUSPENSION ORAL at 18:35

## 2020-04-16 RX ADMIN — INSULIN ASPART SCH UNITS: 100 INJECTION, SOLUTION INTRAVENOUS; SUBCUTANEOUS at 06:30

## 2020-04-16 RX ADMIN — Medication SCH TAB: at 09:35

## 2020-04-16 RX ADMIN — INSULIN ASPART SCH UNITS: 100 INJECTION, SOLUTION INTRAVENOUS; SUBCUTANEOUS at 11:11

## 2020-04-16 RX ADMIN — CINACALCET HYDROCHLORIDE SCH MG: 30 TABLET, COATED ORAL at 09:35

## 2020-04-16 RX ADMIN — DOCUSATE SODIUM SCH MG: 100 CAPSULE, LIQUID FILLED ORAL at 18:35

## 2020-04-16 RX ADMIN — SEVELAMER CARBONATE SCH MG: 800 POWDER, FOR SUSPENSION ORAL at 13:35

## 2020-04-16 RX ADMIN — DOCUSATE SODIUM SCH MG: 100 CAPSULE, LIQUID FILLED ORAL at 09:35

## 2020-04-16 RX ADMIN — INSULIN ASPART SCH UNITS: 100 INJECTION, SOLUTION INTRAVENOUS; SUBCUTANEOUS at 21:00

## 2020-04-16 NOTE — GENERAL PROGRESS NOTE
Assessment/Plan


Problem List:  


(1) Hyperkalemia


ICD Codes:  E87.5 - Hyperkalemia


SNOMED:  99624901


(2) ESRD (end stage renal disease)


ICD Codes:  N18.6 - End stage renal disease


SNOMED:  50238526


(3) Suspected COVID-19 virus infection


ICD Codes:  R68.89 - Other general symptoms and signs


SNOMED:  204058307


Assessment/Plan:


covid positive


esrd on hd


htn


afebrile


hyperkalemia improved





Subjective


ROS Limited/Unobtainable:  Yes


Allergies:  


Coded Allergies:  


     No Known Allergies (Unverified , 4/16/13)





Objective





Last 24 Hour Vital Signs








  Date Time  Temp Pulse Resp B/P (MAP) Pulse Ox O2 Delivery O2 Flow Rate FiO2


 


4/16/20 16:00 96.6 69 20 134/63 (86) 98   


 


4/16/20 16:00  63      


 


4/16/20 12:00  71      


 


4/16/20 12:00 98.7 70 20 107/57 (74) 98   


 


4/16/20 09:35  71  121/76    


 


4/16/20 09:00      Room Air  


 


4/16/20 08:00  78      


 


4/16/20 08:00 99.0 79 20 121/51 (74) 98   


 


4/16/20 04:00  79      


 


4/16/20 04:00 97.5 72 18 110/66 (81) 95   


 


4/16/20 00:00 97.2 65 18 119/59 (79) 95   


 


4/16/20 00:00  102      


 


4/15/20 21:00      Room Air  

















Intake and Output  


 


 4/15/20 4/16/20





 19:00 07:00


 


Intake Total 120 ml 


 


Output Total  2000 ml


 


Balance 120 ml -2000 ml


 


  


 


Intake Oral 120 ml 


 


Output Hemodialysis UF  2000 ml


 


# Bowel Movements 1 








Height (Feet):  5


Height (Inches):  5.00


Weight (Pounds):  156











Van Silva MD Apr 16, 2020 20:28

## 2020-04-16 NOTE — NEPHROLOGY PROGRESS NOTE
Assessment/Plan


Problem List:  


(1) ESRD (end stage renal disease)


(2) Suspected COVID-19 virus infection


Assessment





End-stage renal disease on hemodialysis


Has arm fistula for dialysis-Next dialysis Wednesday, April 15


Dyspnea and shortness of breath, history of COPD, oxygen dependent at home


Exposure to COVID 19


Diabetes mellitus


Hypertension


Remote history of seizure


Anemia of kidney disease


Plan





COVID 19 test detected


Hemodialysis April 15 Next hemodialysis April 17


Keep the blood pressure and blood sugar in check


2D echocardiogram pending


Antibiotics and pulmonary support per consultants





Chest x-ray:


Findings: There is a left chest  HERO catheter, tip projected at the level of 

the


superior vena cava. Some atelectatic bands are seen in the left midlung. The 

lungs


and pleural spaces are otherwise clear. The heart size is upper limits of 

normal. A


stent is seen in the left axilla


Impression: Left midlung atelectasis. No acute process otherwise





Subjective


ROS Limited/Unobtainable:  No


Constitutional:  Reports: malaise, weakness





Objective


Objective





Last 24 Hour Vital Signs








  Date Time  Temp Pulse Resp B/P (MAP) Pulse Ox O2 Delivery O2 Flow Rate FiO2


 


4/16/20 09:35  71  121/76    


 


4/16/20 04:00  79      


 


4/16/20 04:00 97.5 72 18 110/66 (81) 95   


 


4/16/20 00:00 97.2 65 18 119/59 (79) 95   


 


4/16/20 00:00  102      


 


4/15/20 21:00      Room Air  


 


4/15/20 20:00  64      


 


4/15/20 20:00 99.0 68 19 126/56 (79) 93   


 


4/15/20 16:00 98.0 65 20 128/45 (72) 100   


 


4/15/20 16:00  65      


 


4/15/20 12:00 98.5 66 20 115/53 (73) 100   


 


4/15/20 12:00  61      

















Intake and Output  


 


 4/15/20 4/16/20





 19:00 07:00


 


Intake Total 120 ml 


 


Output Total  2000 ml


 


Balance 120 ml -2000 ml


 


  


 


Intake Oral 120 ml 


 


Output Hemodialysis UF  2000 ml


 


# Bowel Movements 1 








No labs drawn today


Height (Feet):  5


Height (Inches):  5.00


Weight (Pounds):  156


General Appearance:  no apparent distress


Respiratory/Chest:  decreased breath sounds


Abdomen:  soft











Vik Morrison MD Apr 16, 2020 09:52 No

## 2020-04-16 NOTE — NUR
NURSE NOTES: Nurse report given by NI Jacobs. Patient's sleeping but easily arousable, 
eyes open spontaneously, breathing regular and unlabored, denies pain, denies chest pain, no 
s/s of distress or SOB. Bed low and locked, call light within reach, side rails x2, bed 
alarm is armed. IV is saline locked, flushed, and patent. Will continue to monitor.

## 2020-04-16 NOTE — DIAGNOSTIC IMAGING REPORT
Indication: Shortness of breath

 

Technique: One view of the chest

 

Comparison: 4/14/2020

 

Findings: Left-sided HERO catheter again demonstrated. Linear opacities in the left

midlung and right lung base are stable, likely represent areas of scarring. The heart

size is upper limits normal. No significant interim change

 

Impression: 

 

 Unchanged, over one day, findings as above.

## 2020-04-16 NOTE — NUR
CASE MANAGEMENT:REVIEW



4/16/20

SI: COVID 19 PNEUMONIA. ESRD/HD

99.0   79  20  121/51  98% ON RA



IS: RENVELA PO TID

SENSIPAR PO QD

NEPHROVITE PO QD

SS INSULIN AC+HS

PROTONIX PO Q12

**: TELEMETRY STATUS

DCP: FROM HOME WITH OUTPT DIALYSIS

## 2020-04-16 NOTE — NUR
NURSE NOTES:

Dr. Silva and Dr. Pfeiffer made aware of covid test result thru text. No orders was made yet. 
Isolation for covid were still on.

## 2020-04-16 NOTE — NUR
NURSE NOTES: Spoke to Júnior Azar, dialysis nurse to inform patient will have dialysis 
tomorrow 4/17 per Dr. Prince phillips. Dialysis nurse aware.

## 2020-04-16 NOTE — INFECTIOUS DISEASES PROG NOTE
Assessment/Plan


Assessment/Plan


IMPRESSION:


1. Fever resolved


2. COVID-19 disease.


3. COPD.


4. End-stage renal disease on hemodialysis.


5. Diabetes mellitus.


6. Hypertension.


7. Anemia.





RECOMMENDATION:  


We will observe off antibiotic.





Subjective


ROS Limited/Unobtainable:  Yes


Constitutional:  Reports: no symptoms


Respiratory:  Reports: no symptoms


Gastrointestinal/Abdominal:  Reports: no symptoms


Genitourinary:  Reports: no symptoms


Allergies:  


Coded Allergies:  


     No Known Allergies (Unverified , 4/16/13)





Objective


Vital Signs





Last 24 Hour Vital Signs








  Date Time  Temp Pulse Resp B/P (MAP) Pulse Ox O2 Delivery O2 Flow Rate FiO2


 


4/16/20 09:35  71  121/76    


 


4/16/20 09:00      Room Air  


 


4/16/20 08:00  78      


 


4/16/20 08:00 99.0 79 20 121/51 (74) 98   


 


4/16/20 04:00  79      


 


4/16/20 04:00 97.5 72 18 110/66 (81) 95   


 


4/16/20 00:00 97.2 65 18 119/59 (79) 95   


 


4/16/20 00:00  102      


 


4/15/20 21:00      Room Air  


 


4/15/20 20:00  64      


 


4/15/20 20:00 99.0 68 19 126/56 (79) 93   


 


4/15/20 16:00 98.0 65 20 128/45 (72) 100   


 


4/15/20 16:00  65      


 


4/15/20 12:00 98.5 66 20 115/53 (73) 100   


 


4/15/20 12:00  61      








Height (Feet):  5


Height (Inches):  5.00


Weight (Pounds):  156


General Appearance:  no acute distress


HEENT:  mucous membranes moist


Respiratory/Chest:  lungs clear


Cardiovascular:  normal rate


Abdomen:  soft, non tender


Extremities:  no edema


Neurologic/Psychiatric:  alert, responsive





Microbiology








 Date/Time


Source Procedure


Growth Status


 


 


 4/14/20 13:39


Blood Blood Culture - Preliminary


NO GROWTH AFTER 24 HOURS Resulted


 


 4/14/20 13:24


Blood Blood Culture - Preliminary


NO GROWTH AFTER 24 HOURS Resulted


 


 4/14/20 14:11


Nasopharynx Coronavirus COVID-19 PCR ([facility]) - Final Complete











Current Medications








 Medications


  (Trade)  Dose


 Ordered  Sig/Eliazar


 Route


 PRN Reason  Start Time


 Stop Time Status Last Admin


Dose Admin


 


 Acetaminophen


  (Tylenol)  500 mg  Q6H  PRN


 ORAL


 Temp >100.5  4/15/20 01:00


 5/15/20 00:59   


 


 


 Cinacalcet


  (Sensipar)  30 mg  DAILY


 ORAL


   4/15/20 09:00


 7/14/20 08:59  4/16/20 09:35


 


 


 Dextrose


  (Dextrose 50%)  25 ml  Q30M  PRN


 IV


 Hypoglycemia  4/15/20 01:15


 7/14/20 01:14   


 


 


 Dextrose


  (Dextrose 50%)  50 ml  Q30M  PRN


 IV


 Hypoglycemia  4/15/20 01:15


 7/14/20 01:14   


 


 


 Docusate Sodium


  (Colace)  100 mg  TWICE A  DAY


 ORAL


   4/15/20 09:00


 5/15/20 08:59  4/16/20 09:35


 


 


 Hydralazine HCl


  (Apresoline)  25 mg  Q4H  PRN


 ORAL


 bp over 160 syst  4/14/20 19:45


 7/13/20 19:44   


 


 


 Insulin Aspart


  (NovoLOG)    BEFORE MEALS AND  HS


 SUBQ


   4/15/20 06:30


 7/14/20 06:29   


 


 


 Labetalol HCl


  (Normodyne)  100 mg  DAILY


 ORAL


   4/15/20 09:00


 5/15/20 08:59  4/16/20 09:35


 


 


 Pantoprazole


  (Protonix)  40 mg  Q12HR


 ORAL


   4/14/20 21:00


 5/14/20 20:59  4/16/20 09:35


 


 


 Sevelamer


 Carbonate


  (Renvela)  800 mg  THREE TIMES A  DAY


 ORAL


   4/15/20 09:00


 7/14/20 08:59  4/16/20 09:35


 


 


 Vitamin B Complex/


 Vit C/Folic Acid


  (Nephrovite)  1 tab  DAILY


 ORAL


   4/15/20 09:00


 5/15/20 08:59  4/16/20 09:35


 

















Jake Pfeiffer MD Apr 16, 2020 11:22

## 2020-04-16 NOTE — HEMATOLOGY/ONC PROGRESS NOTE
Assessment/Plan


Assessment/Plan








Asses/Recs


# Elevated D-dimer on admission, with sob, in this case r/o dvt of lower ext


--> less likely pe, first r/o dvt given more likely viral cause possible


--> obtain duplex of lower ext


--> repeat in future in 3mo


# Anemia of chronic disease due to underlying chronic medical issues, 

multifactorial v Gi bleed 


--> Anemia workup has been ordered, rule out gi bleed 


--> No evidence of hemolysis is noted, peripheral smear has been reviewed.


--> Hgb goal >7. Transfuse prn.


--> Epogen or iron at this time is not particularly indicated


--> Medications have been reviewed


--> low threshold for gi evaluation in case has occult +


# Dyspnea


--> appears likely covid related


# ESRD (end stage renal disease)


--> hd as per Dr. Morrison


# Seizures.


# Hypertension.


# Diabetes mellitus.





The timing of this note does not necessarily reflect the time of the patient 

was seen.





Greatly appreciate consultation.





Subjective


HEENT:  Denies: no symptoms, eye pain, blurred vision, tearing, double vision, 

ear pain, ear discharge, nose pain, nose congestion, throat pain, throat 

swelling, mouth pain, mouth swelling, other


Cardiovascular:  Denies: no symptoms, chest pain, edema, irregular heart rate, 

lightheadedness, palpitations, syncope, other


Respiratory:  Denies: no symptoms, cough, shortness of breath, SOB with 

excertion, SOB at rest, sputum, wheezing, other


Genitourinary:  Denies: no symptoms, burning, discharge, frequency, flank pain, 

hematuria, incontinence, pain, urgency, other


Neurologic/Psychiatric:  Denies: no symptoms, anxiety, depressed, emotional 

problems, headache, numbness, paresthesia, pre-existing deficit, seizure, 

tingling, tremors, weakness, other


Endocrine:  Denies: no symptoms, excessive sweating, flushing, intolerance to 

cold, intolerance to heat, increased hunger, increased thirst, increased urine, 

unexplained weight gain, unexplained weight loss, other


Hematologic/Lymphatic:  Denies: no symptoms, anemia, easy bleeding, easy 

bruising, adenopathy, other


Allergies:  


Coded Allergies:  


     No Known Allergies (Unverified , 4/16/13)


Subjective


4/16 no bleeding , labs noted, hd as needed, renal aware





Objective


Objective





Current Medications








 Medications


  (Trade)  Dose


 Ordered  Sig/Eliazar


 Route


 PRN Reason  Start Time


 Stop Time Status Last Admin


Dose Admin


 


 Acetaminophen


  (Tylenol)  500 mg  Q6H  PRN


 ORAL


 Temp >100.5  4/15/20 01:00


 5/15/20 00:59   


 


 


 Cinacalcet


  (Sensipar)  30 mg  DAILY


 ORAL


   4/15/20 09:00


 7/14/20 08:59  4/16/20 09:35


 


 


 Dextrose


  (Dextrose 50%)  25 ml  Q30M  PRN


 IV


 Hypoglycemia  4/15/20 01:15


 7/14/20 01:14   


 


 


 Dextrose


  (Dextrose 50%)  50 ml  Q30M  PRN


 IV


 Hypoglycemia  4/15/20 01:15


 7/14/20 01:14   


 


 


 Docusate Sodium


  (Colace)  100 mg  TWICE A  DAY


 ORAL


   4/15/20 09:00


 5/15/20 08:59  4/16/20 09:35


 


 


 Hydralazine HCl


  (Apresoline)  25 mg  Q4H  PRN


 ORAL


 bp over 160 syst  4/14/20 19:45


 7/13/20 19:44   


 


 


 Insulin Aspart


  (NovoLOG)    BEFORE MEALS AND  HS


 SUBQ


   4/15/20 06:30


 7/14/20 06:29   


 


 


 Labetalol HCl


  (Normodyne)  100 mg  DAILY


 ORAL


   4/15/20 09:00


 5/15/20 08:59  4/16/20 09:35


 


 


 Pantoprazole


  (Protonix)  40 mg  Q12HR


 ORAL


   4/14/20 21:00


 5/14/20 20:59  4/16/20 09:35


 


 


 Sevelamer


 Carbonate


  (Renvela)  800 mg  THREE TIMES A  DAY


 ORAL


   4/15/20 09:00


 7/14/20 08:59  4/16/20 09:35


 


 


 Vitamin B Complex/


 Vit C/Folic Acid


  (Nephrovite)  1 tab  DAILY


 ORAL


   4/15/20 09:00


 5/15/20 08:59  4/16/20 09:35


 











Last 24 Hour Vital Signs








  Date Time  Temp Pulse Resp B/P (MAP) Pulse Ox O2 Delivery O2 Flow Rate FiO2


 


4/16/20 09:35  71  121/76    


 


4/16/20 09:00      Room Air  


 


4/16/20 08:00  78      


 


4/16/20 08:00 99.0 79 20 121/51 (74) 98   


 


4/16/20 04:00  79      


 


4/16/20 04:00 97.5 72 18 110/66 (81) 95   


 


4/16/20 00:00 97.2 65 18 119/59 (79) 95   


 


4/16/20 00:00  102      


 


4/15/20 21:00      Room Air  


 


4/15/20 20:00  64      


 


4/15/20 20:00 99.0 68 19 126/56 (79) 93   


 


4/15/20 16:00 98.0 65 20 128/45 (72) 100   


 


4/15/20 16:00  65      


 


4/15/20 12:00 98.5 66 20 115/53 (73) 100   


 


4/15/20 12:00  61      


 


4/15/20 09:00  65  114/49    


 


4/15/20 08:54      Room Air  


 


4/15/20 08:00 98.4 65 20 114/49 (70) 100   


 


4/15/20 08:00  58      


 


4/15/20 04:00 98.1 61 19 125/54 (77) 96   


 


4/15/20 04:00  60      


 


4/15/20 01:37      Room Air  


 


4/15/20 00:15  61      


 


4/15/20 00:15 99.0 60 14 128/68 97 Room Air  97


 


4/14/20 23:00 99.0 60 14 128/68 97 Room Air  


 


4/14/20 21:00 99.0 58 14 132/70 96 Room Air  


 


4/14/20 18:59 99.9 70 19 138/78 100 Room Air  


 


4/14/20 16:30 99.9 71 16 140/73 99 Room Air  


 


4/14/20 14:18 99.9       


 


4/14/20 13:45  70 18   Room Air  97


 


4/14/20 13:45 100.8 70 18 146/67 97 Room Air  


 


4/14/20 13:14 100.8 68 16 161/68 (99) 95 Room Air  

















Intake and Output  


 


 4/15/20 4/16/20





 19:00 07:00


 


Intake Total 120 ml 


 


Output Total  2000 ml


 


Balance 120 ml -2000 ml


 


  


 


Intake Oral 120 ml 


 


Output Hemodialysis UF  2000 ml


 


# Bowel Movements 1 











Labs








Test


  4/14/20


13:22 4/14/20


13:39 4/15/20


08:45


 


Arterial Blood pH  (7.350-7.450)   


 


Arterial Blood Partial


Pressure CO2 mmHg


(35.0-45.0) 


  


 


 


Arterial Blood Partial


Pressure O2 mmHg


(75.0-100.0) 


  


 


 


Arterial Blood HCO3


  mmol/L


(22.0-26.0) 


  


 


 


Arterial Blood Oxygen


Saturation  % () 


  


  


 


 


Arterial Blood Base Excess  (-2-2)   


 


Aris Test    


 


White Blood Count


  


  7.0 K/UL


(4.8-10.8) 6.6 K/UL


(4.8-10.8)


 


Red Blood Count


  


  3.49 M/UL


(4.70-6.10) 3.61 M/UL


(4.70-6.10)


 


Hemoglobin


  


  10.0 G/DL


(14.2-18.0) 10.5 G/DL


(14.2-18.0)


 


Hematocrit


  


  30.9 %


(42.0-52.0) 32.1 %


(42.0-52.0)


 


Mean Corpuscular Volume  89 FL (80-99)  89 FL (80-99) 


 


Mean Corpuscular Hemoglobin


  


  28.7 PG


(27.0-31.0) 29.0 PG


(27.0-31.0)


 


Mean Corpuscular Hemoglobin


Concent 


  32.4 G/DL


(32.0-36.0) 32.6 G/DL


(32.0-36.0)


 


Red Cell Distribution Width


  


  13.5 %


(11.6-14.8) 13.3 %


(11.6-14.8)


 


Platelet Count


  


  164 K/UL


(150-450) 180 K/UL


(150-450)


 


Mean Platelet Volume


  


  8.7 FL


(6.5-10.1) 8.5 FL


(6.5-10.1)


 


Neutrophils (%) (Auto)


  


  79.0 %


(45.0-75.0) 77.5 %


(45.0-75.0)


 


Lymphocytes (%) (Auto)


  


  13.3 %


(20.0-45.0) 16.8 %


(20.0-45.0)


 


Monocytes (%) (Auto)


  


  7.1 %


(1.0-10.0) 4.4 %


(1.0-10.0)


 


Eosinophils (%) (Auto)


  


  0.2 %


(0.0-3.0) 0.7 %


(0.0-3.0)


 


Basophils (%) (Auto)


  


  0.4 %


(0.0-2.0) 0.6 %


(0.0-2.0)


 


Sodium Level


  


  139 MMOL/L


(136-145) 139 MMOL/L


(136-145)


 


Potassium Level


  


  5.2 MMOL/L


(3.5-5.1) 4.8 MMOL/L


(3.5-5.1)


 


Chloride Level


  


  100 MMOL/L


() 101 MMOL/L


()


 


Carbon Dioxide Level


  


  29 MMOL/L


(21-32) 26 MMOL/L


(21-32)


 


Anion Gap


  


  10 mmol/L


(5-15) 12 mmol/L


(5-15)


 


Blood Urea Nitrogen


  


  34 mg/dL


(7-18) 39 mg/dL


(7-18)


 


Creatinine


  


  6.7 MG/DL


(0.55-1.30) 8.0 MG/DL


(0.55-1.30)


 


Estimat Glomerular Filtration


Rate 


  8.0 mL/min


(>60) 6.5 mL/min


(>60)


 


Glucose Level


  


  137 MG/DL


() 123 MG/DL


()


 


Lactic Acid Level


  


  1.80 mmol/L


(0.4-2.0) 1.10 mmol/L


(0.4-2.0)


 


Calcium Level


  


  9.0 MG/DL


(8.5-10.1) 8.9 MG/DL


(8.5-10.1)


 


Total Bilirubin


  


  0.5 MG/DL


(0.2-1.0) 0.7 MG/DL


(0.2-1.0)


 


Aspartate Amino Transf


(AST/SGOT) 


  57 U/L (15-37) 


  39 U/L (15-37) 


 


 


Alanine Aminotransferase


(ALT/SGPT) 


  42 U/L (12-78) 


  46 U/L (12-78) 


 


 


Alkaline Phosphatase


  


  81 U/L


() 82 U/L


()


 


Lactate Dehydrogenase


  


  332 U/L


() 


 


 


Total Creatine Kinase


  


  88 U/L


() 


 


 


Creatine Kinase MB


  


  1.0 NG/ML


(0.0-3.6) 


 


 


Creatine Kinase MB Relative


Index 


  1.1 


  


 


 


Troponin I


  


  0.025 ng/mL


(0.000-0.056) 0.030 ng/mL


(0.000-0.056)


 


Pro-B-Type Natriuretic Peptide


  


  66820 pg/mL


(0-125) 06596 pg/mL


(0-125)


 


Total Protein


  


  6.9 G/DL


(6.4-8.2) 6.9 G/DL


(6.4-8.2)


 


Albumin


  


  3.1 G/DL


(3.4-5.0) 3.0 G/DL


(3.4-5.0)


 


Globulin  3.8 g/dL  3.9 g/dL 


 


Albumin/Globulin Ratio  0.8 (1.0-2.7)  0.8 (1.0-2.7) 


 


D-Dimer


  


  


  1.36 mg/L FEU


(0.00-0.49)


 


Hemoglobin A1c


  


  


  5.9 %


(4.3-6.0)


 


Uric Acid


  


  


  5.3 MG/DL


(2.6-7.2)


 


Phosphorus Level


  


  


  3.1 MG/DL


(2.5-4.9)


 


Magnesium Level


  


  


  2.1 MG/DL


(1.8-2.4)


 


Iron Level


  


  


  19 ug/dL


()


 


Total Iron Binding Capacity


  


  


  108 ug/dL


(250-450)


 


Percent Iron Saturation   18 % (15-50) 


 


Unsaturated Iron Binding


  


  


  89 ug/dL


(112-346)


 


Ferritin


  


  


  > 2000 NG/ML


(8-388)


 


Gamma Glutamyl Transpeptidase   37 U/L (5-85) 


 


Ammonia


  


  


  33 umol/L


(11-32)


 


C-Reactive Protein,


Quantitative 


  


  18.4 mg/dL


(0.00-0.90)


 


Triglycerides Level


  


  


  125 MG/DL


()


 


Cholesterol Level


  


  


  145 MG/DL (<


200)


 


LDL Cholesterol


  


  


  52 mg/dL


(<100)


 


HDL Cholesterol


  


  


  65 MG/DL


(40-60)


 


Cholesterol/HDL Ratio   2.2 (3.3-4.4) 


 


Vitamin B12 Level


  


  


  975 PG/ML


(193-986)


 


Folate


  


  


  45.8 NG/ML


(8.6-58.9)


 


Thyroid Stimulating Hormone


(TSH) 


  


  0.311 uiU/mL


(0.358-3.740)


 


Hepatitis B Surface Antigen


  


  


  Negative


(NEGATIVE)








Height (Feet):  5


Height (Inches):  5.00


Weight (Pounds):  156


Objective





Physical Exam


Vitals: reviewed


General: NAD


HEENT: nc, at


Neck: supple


Chest: clear breath sounds bilaterally


Cardiovascular: RRR, no s3, s4


Neuro: alert and oriented











Mt Staley MD Apr 16, 2020 11:09

## 2020-04-16 NOTE — HISTORY AND PHYSICAL REPORT
DATE OF ADMISSION:  04/14/2020

HISTORY OF PRESENT ILLNESS:  The patient comes in because of shortness of

breath, cough, upper respiratory symptoms.  The patient apparently had

some contacts with COVID.  The patient also is complaining of productive

cough.  The patient was sent from the dialysis because of shortness of

breath and upper respiratory symptoms.  The patient also has COPD and is

oxygen dependent at home.  The patient was having increased shortness of

breath and no fever was reported, but cough was reported.  Apparently, the

patient's son is currently in the intensive care unit for COVID-19

infection.  Denies chest pain.  Denies nausea, vomiting, or diarrhea.



PAST MEDICAL HISTORY:  Significant for end-stage renal disease, on

hemodialysis, COPD, hypertension, history of diabetes, history of

seizures.



PAST SURGICAL HISTORY:  Related to hemodialysis.



FAMILY HISTORY:  Noncontributory.



SOCIAL HISTORY:  Denies history of smoking.  Denies history of alcohol or

illicit drugs.



MEDICATIONS:  The patient takes Sensipar, Normodyne and Renvela.



REVIEW OF SYSTEMS:  HEENT:  Denies headaches.  RESPIRATORY:  Reports

shortness of breath and cough for couple of days.  CARDIOVASCULAR:  Denies

chest pain.  Denies orthopnea.  GASTROINTESTINAL:  Denies nausea,

vomiting, or diarrhea.  EXTREMITIES:  Denies pain in lower extremities.

CNS:  Denies change in speech pattern.  He feels weak.



PHYSICAL EXAMINATION:

VITAL SIGNS:  Temperature 98.1, pulse 61, blood pressure 124/54.

HEENT:  PERRLA.

NECK:  Supple.  No lymphadenopathy.

CHEST:  Bibasilar rales.

CARDIOVASCULAR:  Regular rate and rhythm.

ABDOMEN:  Soft, nontender, and nondistended.  No organomegaly.

EXTREMITIES:  Does have edema.  Reflexes on both sides.  Moves all

extremities.

NEUROLOGIC:  Generalized weakness.



LABORATORY DATA:  WBC of 7, hemoglobin 10, platelets of 164.  Sodium 139,

potassium 4.8, BUN of 39, creatinine of 8, glucose of 136.  Troponin

0.03.



ASSESSMENT AND PLAN:  Shortness of breath, cough, upper respiratory

symptoms, rule out COVID.  Chest x-ray shows left mid lung atelectasis.

Patient has end-stage renal disease, on hemodialysis, rule out pneumonia,

respiratory insufficiency.  For those reasons, I have consulted Dr. Morrison, Dr. Solomon, and Dr. Jake Pfeiffer to help with the management

of dialysis as well as to take care of the pneumonia.  Antibiotics per Dr. Jake Pfeiffer.









  ______________________________________________

  Vna Silva M.D.





DR:  Jimmy

D:  04/15/2020 21:55

T:  04/16/2020 06:34

JOB#:  [id number]

CC:

## 2020-04-16 NOTE — PULMONOLOGY PROGRESS NOTE
Assessment/Plan


Assessment/Plan


IMPRESSION:


1. History of COVID-19 exposure. Is + COVID 19 here


2. ESRD, on dialysis.


3. Diabetes mellitus.


4. Dyspnea.





DISCUSSION:  


It is prudent to admit the patient and watch carefully.


Consider use of Plaquenil or azithromycin only if patient becomes hypoxic.


Currently SaO2 95% on RA


Currently, he is doing well.  I would avoid unnecessary antibiotics.


Agree with dialysis.  I will follow carefully.














  ______________________________________________


  Oh Solomon M.D.





Subjective


Interval Events:  Noted + COVID 19


Constitutional:  Reports: no symptoms


HEENT:  Repors: no symptoms


Respiratory:  Reports: shortness of breath


Cardiovascular:  Reports: no symptoms


Gastrointestinal/Abdominal:  Reports: no symptoms


Allergies:  


Coded Allergies:  


     No Known Allergies (Unverified , 4/16/13)





Objective





Last 24 Hour Vital Signs








  Date Time  Temp Pulse Resp B/P (MAP) Pulse Ox O2 Delivery O2 Flow Rate FiO2


 


4/16/20 09:35  71  121/76    


 


4/16/20 09:00      Room Air  


 


4/16/20 08:00  78      


 


4/16/20 08:00 99.0 79 20 121/51 (74) 98   


 


4/16/20 04:00  79      


 


4/16/20 04:00 97.5 72 18 110/66 (81) 95   


 


4/16/20 00:00 97.2 65 18 119/59 (79) 95   


 


4/16/20 00:00  102      


 


4/15/20 21:00      Room Air  


 


4/15/20 20:00  64      


 


4/15/20 20:00 99.0 68 19 126/56 (79) 93   


 


4/15/20 16:00 98.0 65 20 128/45 (72) 100   


 


4/15/20 16:00  65      


 


4/15/20 12:00 98.5 66 20 115/53 (73) 100   


 


4/15/20 12:00  61      

















Intake and Output  


 


 4/15/20 4/16/20





 19:00 07:00


 


Intake Total 120 ml 


 


Output Total  2000 ml


 


Balance 120 ml -2000 ml


 


  


 


Intake Oral 120 ml 


 


Output Hemodialysis UF  2000 ml


 


# Bowel Movements 1 








General Appearance:  no acute distress


HEENT:  normocephalic


Respiratory/Chest:  chest wall non-tender


Cardiovascular:  normal peripheral pulses


Abdomen:  normal bowel sounds


Extremities:  no cyanosis





Microbiology








 Date/Time


Source Procedure


Growth Status


 


 


 4/14/20 13:39


Blood Blood Culture - Preliminary


NO GROWTH AFTER 24 HOURS Resulted


 


 4/14/20 13:24


Blood Blood Culture - Preliminary


NO GROWTH AFTER 24 HOURS Resulted


 


 4/14/20 14:11


Nasopharynx Coronavirus COVID-19 PCR ([facility]) - Final Complete











Current Medications








 Medications


  (Trade)  Dose


 Ordered  Sig/Eliazar


 Route


 PRN Reason  Start Time


 Stop Time Status Last Admin


Dose Admin


 


 Acetaminophen


  (Tylenol)  500 mg  Q6H  PRN


 ORAL


 Temp >100.5  4/15/20 01:00


 5/15/20 00:59   


 


 


 Cinacalcet


  (Sensipar)  30 mg  DAILY


 ORAL


   4/15/20 09:00


 7/14/20 08:59  4/16/20 09:35


 


 


 Dextrose


  (Dextrose 50%)  25 ml  Q30M  PRN


 IV


 Hypoglycemia  4/15/20 01:15


 7/14/20 01:14   


 


 


 Dextrose


  (Dextrose 50%)  50 ml  Q30M  PRN


 IV


 Hypoglycemia  4/15/20 01:15


 7/14/20 01:14   


 


 


 Docusate Sodium


  (Colace)  100 mg  TWICE A  DAY


 ORAL


   4/15/20 09:00


 5/15/20 08:59  4/16/20 09:35


 


 


 Hydralazine HCl


  (Apresoline)  25 mg  Q4H  PRN


 ORAL


 bp over 160 syst  4/14/20 19:45


 7/13/20 19:44   


 


 


 Insulin Aspart


  (NovoLOG)    BEFORE MEALS AND  HS


 SUBQ


   4/15/20 06:30


 7/14/20 06:29   


 


 


 Labetalol HCl


  (Normodyne)  100 mg  DAILY


 ORAL


   4/15/20 09:00


 5/15/20 08:59  4/16/20 09:35


 


 


 Pantoprazole


  (Protonix)  40 mg  Q12HR


 ORAL


   4/14/20 21:00


 5/14/20 20:59  4/16/20 09:35


 


 


 Sevelamer


 Carbonate


  (Renvela)  800 mg  THREE TIMES A  DAY


 ORAL


   4/15/20 09:00


 7/14/20 08:59  4/16/20 09:35


 


 


 Vitamin B Complex/


 Vit C/Folic Acid


  (Nephrovite)  1 tab  DAILY


 ORAL


   4/15/20 09:00


 5/15/20 08:59  4/16/20 09:35


 

















Oh Solomon MD Apr 16, 2020 11:21

## 2020-04-16 NOTE — NUR
Verbal report received from Joslyn DAN. Bedside rounds. Pt awake in bed, eyes open, spontaneous 
breathing regular and unlabored. Denies pain, denies chest pain, no s/s of distress or SOB. 
Bed remains in lowest position, locked. Call light with in reach. Siderails up X2. Bed alarm 
armed, IV saline locked, flushed and patent. Will cont to monitor for changes and assist as 
needed. Covid 19 protocols per hospital policy maintained.

## 2020-04-17 VITALS — SYSTOLIC BLOOD PRESSURE: 135 MMHG | DIASTOLIC BLOOD PRESSURE: 54 MMHG

## 2020-04-17 VITALS — SYSTOLIC BLOOD PRESSURE: 110 MMHG | DIASTOLIC BLOOD PRESSURE: 72 MMHG

## 2020-04-17 VITALS — SYSTOLIC BLOOD PRESSURE: 132 MMHG | DIASTOLIC BLOOD PRESSURE: 48 MMHG

## 2020-04-17 VITALS — SYSTOLIC BLOOD PRESSURE: 117 MMHG | DIASTOLIC BLOOD PRESSURE: 48 MMHG

## 2020-04-17 VITALS — DIASTOLIC BLOOD PRESSURE: 59 MMHG | SYSTOLIC BLOOD PRESSURE: 116 MMHG

## 2020-04-17 VITALS — SYSTOLIC BLOOD PRESSURE: 132 MMHG | DIASTOLIC BLOOD PRESSURE: 53 MMHG

## 2020-04-17 LAB
ADD MANUAL DIFF: YES
ALBUMIN SERPL-MCNC: 2.6 G/DL (ref 3.4–5)
ALBUMIN/GLOB SERPL: 0.7 {RATIO} (ref 1–2.7)
ALP SERPL-CCNC: 71 U/L (ref 46–116)
ALT SERPL-CCNC: 34 U/L (ref 12–78)
ANION GAP SERPL CALC-SCNC: 8 MMOL/L (ref 5–15)
AST SERPL-CCNC: 43 U/L (ref 15–37)
BILIRUB SERPL-MCNC: 0.6 MG/DL (ref 0.2–1)
BUN SERPL-MCNC: 44 MG/DL (ref 7–18)
CALCIUM SERPL-MCNC: 8.3 MG/DL (ref 8.5–10.1)
CHLORIDE SERPL-SCNC: 96 MMOL/L (ref 98–107)
CO2 SERPL-SCNC: 31 MMOL/L (ref 21–32)
CREAT SERPL-MCNC: 8.8 MG/DL (ref 0.55–1.3)
ERYTHROCYTE [DISTWIDTH] IN BLOOD BY AUTOMATED COUNT: 13.1 % (ref 11.6–14.8)
GLOBULIN SER-MCNC: 3.6 G/DL
HCT VFR BLD CALC: 28.4 % (ref 42–52)
HGB BLD-MCNC: 9.8 G/DL (ref 14.2–18)
MCV RBC AUTO: 87 FL (ref 80–99)
PHOSPHATE SERPL-MCNC: 3.8 MG/DL (ref 2.5–4.9)
PLATELET # BLD: 113 K/UL (ref 150–450)
POTASSIUM SERPL-SCNC: 4.7 MMOL/L (ref 3.5–5.1)
RBC # BLD AUTO: 3.27 M/UL (ref 4.7–6.1)
SODIUM SERPL-SCNC: 135 MMOL/L (ref 136–145)
WBC # BLD AUTO: 4.9 K/UL (ref 4.8–10.8)

## 2020-04-17 RX ADMIN — DOCUSATE SODIUM SCH MG: 100 CAPSULE, LIQUID FILLED ORAL at 08:37

## 2020-04-17 RX ADMIN — INSULIN ASPART SCH UNITS: 100 INJECTION, SOLUTION INTRAVENOUS; SUBCUTANEOUS at 06:30

## 2020-04-17 RX ADMIN — INSULIN ASPART SCH UNITS: 100 INJECTION, SOLUTION INTRAVENOUS; SUBCUTANEOUS at 11:30

## 2020-04-17 RX ADMIN — INSULIN ASPART SCH UNITS: 100 INJECTION, SOLUTION INTRAVENOUS; SUBCUTANEOUS at 16:30

## 2020-04-17 RX ADMIN — SEVELAMER CARBONATE SCH MG: 800 POWDER, FOR SUSPENSION ORAL at 13:28

## 2020-04-17 RX ADMIN — INSULIN ASPART SCH UNITS: 100 INJECTION, SOLUTION INTRAVENOUS; SUBCUTANEOUS at 21:00

## 2020-04-17 RX ADMIN — CINACALCET HYDROCHLORIDE SCH MG: 30 TABLET, COATED ORAL at 08:37

## 2020-04-17 RX ADMIN — DOCUSATE SODIUM SCH MG: 100 CAPSULE, LIQUID FILLED ORAL at 18:00

## 2020-04-17 RX ADMIN — SEVELAMER CARBONATE SCH MG: 800 POWDER, FOR SUSPENSION ORAL at 18:01

## 2020-04-17 RX ADMIN — SEVELAMER CARBONATE SCH MG: 800 POWDER, FOR SUSPENSION ORAL at 08:37

## 2020-04-17 RX ADMIN — Medication SCH TAB: at 08:37

## 2020-04-17 NOTE — NUR
CASE MANAGEMENT:REVIEW



4/17/20

SI: COVID 19 PNEUMONIA. ESRD/HD

98.2   58  18  135/54  96% on 2l/nc

H/H-9.8/28.4   PLT-113   BUN+44   CR+8.8   BNP+[id number]



IS: RENVELA PO TID

SENSIPAR PO QD

LABETALOL PO QD

SS INSULIN AC+HS

PROTONIX PO Q12

NEPHROVITE PO QD

SS INSULIN AC+HS

PROTONIX PO Q12

**: TELEMETRY STATUS

DCP: FROM HOME



PLAN:

SCHEDULED FOR DIALYSIS TODAY

DCP: FROM HOME WITH OUTPT DIALYSIS

## 2020-04-17 NOTE — HEMATOLOGY/ONC PROGRESS NOTE
Assessment/Plan


Assessment/Plan


Asses/Recs


# Elevated D-dimer on admission, with sob, in this case r/o dvt of lower ext


--> less likely pe, first r/o dvt given more likely viral cause possible


--> obtain duplex of lower ext


--> repeat in future in 3mo


# Anemia of chronic disease due to underlying chronic medical issues, 

multifactorial v Gi bleed 


--> Anemia workup has been ordered, rule out gi bleed 


--> No evidence of hemolysis is noted, peripheral smear has been reviewed.


--> Hgb goal >7. Transfuse prn.


--> Epogen or iron at this time is not particularly indicated


--> Medications have been reviewed


--> hgb trend: 9.8 


# Dyspnea


--> appears likely covid related


# ESRD (end stage renal disease)


--> hd as per Dr. Morrison


# Seizures.


# Hypertension.


# Diabetes mellitus.





The timing of this note does not necessarily reflect the time of the patient 

was seen.





Greatly appreciate consultation.





Subjective


Allergies:  


Coded Allergies:  


     No Known Allergies (Unverified , 4/16/13)


Subjective


4/16 no bleeding , labs noted, hd as needed, renal aware


4/17 on tele, no acute events, nc 2l, sodium 135, hd for today





Objective


Objective





Current Medications








 Medications


  (Trade)  Dose


 Ordered  Sig/Eliazar


 Route


 PRN Reason  Start Time


 Stop Time Status Last Admin


Dose Admin


 


 Acetaminophen


  (Tylenol)  500 mg  Q6H  PRN


 ORAL


 Temp >100.5  4/15/20 01:00


 5/15/20 00:59   


 


 


 Cinacalcet


  (Sensipar)  30 mg  DAILY


 ORAL


   4/15/20 09:00


 7/14/20 08:59  4/17/20 08:37


 


 


 Dextrose


  (Dextrose 50%)  25 ml  Q30M  PRN


 IV


 Hypoglycemia  4/15/20 01:15


 7/14/20 01:14   


 


 


 Dextrose


  (Dextrose 50%)  50 ml  Q30M  PRN


 IV


 Hypoglycemia  4/15/20 01:15


 7/14/20 01:14   


 


 


 Docusate Sodium


  (Colace)  100 mg  TWICE A  DAY


 ORAL


   4/15/20 09:00


 5/15/20 08:59  4/17/20 08:37


 


 


 Hydralazine HCl


  (Apresoline)  25 mg  Q4H  PRN


 ORAL


 bp over 160 syst  4/14/20 19:45


 7/13/20 19:44   


 


 


 Insulin Aspart


  (NovoLOG)    BEFORE MEALS AND  HS


 SUBQ


   4/15/20 06:30


 7/14/20 06:29   


 


 


 Labetalol HCl


  (Normodyne)  100 mg  DAILY


 ORAL


   4/15/20 09:00


 5/15/20 08:59  4/16/20 09:35


 


 


 Pantoprazole


  (Protonix)  40 mg  Q12HR


 ORAL


   4/14/20 21:00


 5/14/20 20:59  4/17/20 08:37


 


 


 Sevelamer


 Carbonate


  (Renvela)  800 mg  THREE TIMES A  DAY


 ORAL


   4/15/20 09:00


 7/14/20 08:59  4/17/20 08:37


 


 


 Vitamin B Complex/


 Vit C/Folic Acid


  (Nephrovite)  1 tab  DAILY


 ORAL


   4/15/20 09:00


 5/15/20 08:59  4/17/20 08:37


 











Last 24 Hour Vital Signs








  Date Time  Temp Pulse Resp B/P (MAP) Pulse Ox O2 Delivery O2 Flow Rate FiO2


 


4/17/20 08:45      Nasal Cannula 2.0 


 


4/17/20 08:00 99.1  20 132/48 (76) 60   


 


4/17/20 08:00  61      


 


4/17/20 04:00  57      


 


4/17/20 04:00 97.9  19 116/59 (78) 95   


 


4/17/20 00:00  59      


 


4/17/20 00:00 97.7  20 110/72 (85) 95   


 


4/16/20 21:00      Room Air  


 


4/16/20 20:00  65      


 


4/16/20 20:00 96.1 63 18 126/51 (76) 94   


 


4/16/20 16:00 96.6 69 20 134/63 (86) 98   


 


4/16/20 16:00  63      


 


4/16/20 12:00  71      


 


4/16/20 12:00 98.7 70 20 107/57 (74) 98   


 


4/16/20 09:35  71  121/76    


 


4/16/20 09:00      Room Air  


 


4/16/20 08:00  78      


 


4/16/20 08:00 99.0 79 20 121/51 (74) 98   


 


4/16/20 04:00  79      


 


4/16/20 04:00 97.5 72 18 110/66 (81) 95   


 


4/16/20 00:00 97.2 65 18 119/59 (79) 95   


 


4/16/20 00:00  102      


 


4/15/20 21:00      Room Air  


 


4/15/20 20:00  64      


 


4/15/20 20:00 99.0 68 19 126/56 (79) 93   


 


4/15/20 16:00 98.0 65 20 128/45 (72) 100   


 


4/15/20 16:00  65      

















Intake and Output  


 


 4/16/20 4/17/20





 19:00 07:00


 


Intake Total 370 ml 118 ml


 


Output Total  0 ml


 


Balance 370 ml 118 ml


 


  


 


Intake Oral 370 ml 118 ml


 


Output Urine Total  0 ml


 


# Voids 2 


 


# Bowel Movements 1 











Labs








Test


  4/14/20


13:22 4/14/20


13:39 4/15/20


08:45 4/17/20


06:46


 


Arterial Blood pH  (7.350-7.450)    


 


Arterial Blood Partial


Pressure CO2 mmHg


(35.0-45.0) 


  


  


 


 


Arterial Blood Partial


Pressure O2 mmHg


(75.0-100.0) 


  


  


 


 


Arterial Blood HCO3


  mmol/L


(22.0-26.0) 


  


  


 


 


Arterial Blood Oxygen


Saturation  % () 


  


  


  


 


 


Arterial Blood Base Excess  (-2-2)    


 


Aris Test     


 


White Blood Count


  


  7.0 K/UL


(4.8-10.8) 6.6 K/UL


(4.8-10.8) 4.9 K/UL


(4.8-10.8)


 


Red Blood Count


  


  3.49 M/UL


(4.70-6.10) 3.61 M/UL


(4.70-6.10) 3.27 M/UL


(4.70-6.10)


 


Hemoglobin


  


  10.0 G/DL


(14.2-18.0) 10.5 G/DL


(14.2-18.0) 9.8 G/DL


(14.2-18.0)


 


Hematocrit


  


  30.9 %


(42.0-52.0) 32.1 %


(42.0-52.0) 28.4 %


(42.0-52.0)


 


Mean Corpuscular Volume  89 FL (80-99)  89 FL (80-99)  87 FL (80-99) 


 


Mean Corpuscular Hemoglobin


  


  28.7 PG


(27.0-31.0) 29.0 PG


(27.0-31.0) 30.0 PG


(27.0-31.0)


 


Mean Corpuscular Hemoglobin


Concent 


  32.4 G/DL


(32.0-36.0) 32.6 G/DL


(32.0-36.0) 34.5 G/DL


(32.0-36.0)


 


Red Cell Distribution Width


  


  13.5 %


(11.6-14.8) 13.3 %


(11.6-14.8) 13.1 %


(11.6-14.8)


 


Platelet Count


  


  164 K/UL


(150-450) 180 K/UL


(150-450) 113 K/UL


(150-450)


 


Mean Platelet Volume


  


  8.7 FL


(6.5-10.1) 8.5 FL


(6.5-10.1) 7.5 FL


(6.5-10.1)


 


Neutrophils (%) (Auto)


  


  79.0 %


(45.0-75.0) 77.5 %


(45.0-75.0)  % (45.0-75.0) 


 


 


Lymphocytes (%) (Auto)


  


  13.3 %


(20.0-45.0) 16.8 %


(20.0-45.0)  % (20.0-45.0) 


 


 


Monocytes (%) (Auto)


  


  7.1 %


(1.0-10.0) 4.4 %


(1.0-10.0)  % (1.0-10.0) 


 


 


Eosinophils (%) (Auto)


  


  0.2 %


(0.0-3.0) 0.7 %


(0.0-3.0)  % (0.0-3.0) 


 


 


Basophils (%) (Auto)


  


  0.4 %


(0.0-2.0) 0.6 %


(0.0-2.0)  % (0.0-2.0) 


 


 


Sodium Level


  


  139 MMOL/L


(136-145) 139 MMOL/L


(136-145) 135 MMOL/L


(136-145)


 


Potassium Level


  


  5.2 MMOL/L


(3.5-5.1) 4.8 MMOL/L


(3.5-5.1) 4.7 MMOL/L


(3.5-5.1)


 


Chloride Level


  


  100 MMOL/L


() 101 MMOL/L


() 96 MMOL/L


()


 


Carbon Dioxide Level


  


  29 MMOL/L


(21-32) 26 MMOL/L


(21-32) 31 MMOL/L


(21-32)


 


Anion Gap


  


  10 mmol/L


(5-15) 12 mmol/L


(5-15) 8 mmol/L


(5-15)


 


Blood Urea Nitrogen


  


  34 mg/dL


(7-18) 39 mg/dL


(7-18) 44 mg/dL


(7-18)


 


Creatinine


  


  6.7 MG/DL


(0.55-1.30) 8.0 MG/DL


(0.55-1.30) 8.8 MG/DL


(0.55-1.30)


 


Estimat Glomerular Filtration


Rate 


  8.0 mL/min


(>60) 6.5 mL/min


(>60) 5.8 mL/min


(>60)


 


Glucose Level


  


  137 MG/DL


() 123 MG/DL


() 90 MG/DL


()


 


Lactic Acid Level


  


  1.80 mmol/L


(0.4-2.0) 1.10 mmol/L


(0.4-2.0) 


 


 


Calcium Level


  


  9.0 MG/DL


(8.5-10.1) 8.9 MG/DL


(8.5-10.1) 8.3 MG/DL


(8.5-10.1)


 


Total Bilirubin


  


  0.5 MG/DL


(0.2-1.0) 0.7 MG/DL


(0.2-1.0) 0.6 MG/DL


(0.2-1.0)


 


Aspartate Amino Transf


(AST/SGOT) 


  57 U/L (15-37) 


  39 U/L (15-37) 


  43 U/L (15-37) 


 


 


Alanine Aminotransferase


(ALT/SGPT) 


  42 U/L (12-78) 


  46 U/L (12-78) 


  34 U/L (12-78) 


 


 


Alkaline Phosphatase


  


  81 U/L


() 82 U/L


() 71 U/L


()


 


Lactate Dehydrogenase


  


  332 U/L


() 


  


 


 


Total Creatine Kinase


  


  88 U/L


() 


  


 


 


Creatine Kinase MB


  


  1.0 NG/ML


(0.0-3.6) 


  


 


 


Creatine Kinase MB Relative


Index 


  1.1 


  


  


 


 


Troponin I


  


  0.025 ng/mL


(0.000-0.056) 0.030 ng/mL


(0.000-0.056) 


 


 


Pro-B-Type Natriuretic Peptide


  


  80471 pg/mL


(0-125) 85591 pg/mL


(0-125) 07142 pg/mL


(0-125)


 


Total Protein


  


  6.9 G/DL


(6.4-8.2) 6.9 G/DL


(6.4-8.2) 6.2 G/DL


(6.4-8.2)


 


Albumin


  


  3.1 G/DL


(3.4-5.0) 3.0 G/DL


(3.4-5.0) 2.6 G/DL


(3.4-5.0)


 


Globulin  3.8 g/dL  3.9 g/dL  3.6 g/dL 


 


Albumin/Globulin Ratio  0.8 (1.0-2.7)  0.8 (1.0-2.7)  0.7 (1.0-2.7) 


 


D-Dimer


  


  


  1.36 mg/L FEU


(0.00-0.49) 


 


 


Hemoglobin A1c


  


  


  5.9 %


(4.3-6.0) 


 


 


Uric Acid


  


  


  5.3 MG/DL


(2.6-7.2) 


 


 


Phosphorus Level


  


  


  3.1 MG/DL


(2.5-4.9) 3.8 MG/DL


(2.5-4.9)


 


Magnesium Level


  


  


  2.1 MG/DL


(1.8-2.4) 2.0 MG/DL


(1.8-2.4)


 


Iron Level


  


  


  19 ug/dL


() 


 


 


Total Iron Binding Capacity


  


  


  108 ug/dL


(250-450) 


 


 


Percent Iron Saturation   18 % (15-50)  


 


Unsaturated Iron Binding


  


  


  89 ug/dL


(112-346) 


 


 


Ferritin


  


  


  > 2000 NG/ML


(8-388) 


 


 


Gamma Glutamyl Transpeptidase   37 U/L (5-85)  


 


Ammonia


  


  


  33 umol/L


(11-32) 


 


 


C-Reactive Protein,


Quantitative 


  


  18.4 mg/dL


(0.00-0.90) 21.4 mg/dL


(0.00-0.90)


 


Triglycerides Level


  


  


  125 MG/DL


() 


 


 


Cholesterol Level


  


  


  145 MG/DL (<


200) 


 


 


LDL Cholesterol


  


  


  52 mg/dL


(<100) 


 


 


HDL Cholesterol


  


  


  65 MG/DL


(40-60) 


 


 


Cholesterol/HDL Ratio   2.2 (3.3-4.4)  


 


Vitamin B12 Level


  


  


  975 PG/ML


(193-986) 


 


 


Folate


  


  


  45.8 NG/ML


(8.6-58.9) 


 


 


Thyroid Stimulating Hormone


(TSH) 


  


  0.311 uiU/mL


(0.358-3.740) 


 


 


Hepatitis B Surface Antigen


  


  


  Negative


(NEGATIVE) 


 


 


Differential Total Cells


Counted 


  


  


  100 


 


 


Neutrophils % (Manual)    73 % (45-75) 


 


Lymphocytes % (Manual)    18 % (20-45) 


 


Monocytes % (Manual)    9 % (1-10) 


 


Eosinophils % (Manual)    0 % (0-3) 


 


Basophils % (Manual)    0 % (0-2) 


 


Band Neutrophils    0 % (0-8) 


 


Platelet Estimate    Decreased 


 


Platelet Morphology    Normal 


 


Red Blood Cell Morphology    Normal 








Height (Feet):  5


Height (Inches):  5.00


Weight (Pounds):  156


Objective





Physical Exam


Vitals: reviewed


General: NAD


HEENT: nc, at


Neck: supple


Chest: clear breath sounds bilaterally


Cardiovascular: RRR, no s3, s4


Neuro: alert and oriented











Mt Staley MD Apr 17, 2020 13:14

## 2020-04-17 NOTE — NUR
NURSE NOTES: RECEIVED PATIENT LYING IN BED, AWAKE, ALERT/ORIENTED X3, [place] SPEAKING, 
DENIES PAIN. AV SHUNT INTACT TO LEFT UPPER ARM, +BRUITT/THRILL, S/P HD, NO SIGNS OF 
BLEEDING, DRESSING DRY AND INTACT.  NO SIGNS AND SYMPTOMS OF ACUTE CARDIO RESPIRATORY 
DISTRESS/SHORTNESS OF BREATH, NO PERIPHERAL EDEMA NOTED, CONTINUE ON CARDIAC MONITOR. IV 
INTACT TO RIGHT HAND/GAUGE 20, NO REDNESS/SWELLING NOTED TO SITE, SL. ABDOMEN SOFT/NON 
DISTENDED, NO REPORT OF N/V/D. SIDE RAILS UP X3 FOR SAFETY, BED IN LOWEST POSITION FOR 
SAFETY, ENCOURAGED PATIENT TO UTILIZE CALL LIGHT FOR ASSISTANCE, VERBALIZED UNDERSTANDING. 
CONTINUE WITH CURRENT PLAN OF CARE. NAD.

## 2020-04-17 NOTE — PULMONOLOGY PROGRESS NOTE
Assessment/Plan


Assessment/Plan


IMPRESSION:


1. History of COVID-19 exposure. Is + COVID 19 here


2. ESRD, on dialysis.


3. Diabetes mellitus.


4. Dyspnea.





DISCUSSION:  





Consider use of Plaquenil or azithromycin only if patient becomes hypoxic.


Currently SaO2 95% on RA alternating with 2L/min O2


Currently, he is doing well.  I would avoid unnecessary antibiotics.


Agree with dialysis.  I will follow carefully.














  ______________________________________________


  Oh Solomon M.D.





Subjective


Interval Events:  None new


Constitutional:  Reports: no symptoms


HEENT:  Repors: no symptoms


Respiratory:  Reports: no symptoms


Cardiovascular:  Reports: no symptoms


Gastrointestinal/Abdominal:  Reports: no symptoms


Allergies:  


Coded Allergies:  


     No Known Allergies (Unverified , 4/16/13)





Objective





Last 24 Hour Vital Signs








  Date Time  Temp Pulse Resp B/P (MAP) Pulse Ox O2 Delivery O2 Flow Rate FiO2


 


4/17/20 08:45      Nasal Cannula 2.0 


 


4/17/20 08:00 99.1  20 132/48 (76) 60   


 


4/17/20 08:00  61      


 


4/17/20 04:00  57      


 


4/17/20 04:00 97.9  19 116/59 (78) 95   


 


4/17/20 00:00  59      


 


4/17/20 00:00 97.7  20 110/72 (85) 95   


 


4/16/20 21:00      Room Air  


 


4/16/20 20:00  65      


 


4/16/20 20:00 96.1 63 18 126/51 (76) 94   


 


4/16/20 16:00 96.6 69 20 134/63 (86) 98   


 


4/16/20 16:00  63      


 


4/16/20 12:00  71      


 


4/16/20 12:00 98.7 70 20 107/57 (74) 98   

















Intake and Output  


 


 4/16/20 4/17/20





 19:00 07:00


 


Intake Total 370 ml 118 ml


 


Output Total  0 ml


 


Balance 370 ml 118 ml


 


  


 


Intake Oral 370 ml 118 ml


 


Output Urine Total  0 ml


 


# Voids 2 


 


# Bowel Movements 1 








General Appearance:  no acute distress


HEENT:  normocephalic


Respiratory/Chest:  chest wall non-tender, lungs clear


Cardiovascular:  normal peripheral pulses, normal rate


Abdomen:  normal bowel sounds





Microbiology








 Date/Time


Source Procedure


Growth Status


 


 


 4/14/20 13:39


Blood Blood Culture - Preliminary


NO GROWTH AFTER 24 HOURS Resulted


 


 4/14/20 13:24


Blood Blood Culture - Preliminary


NO GROWTH AFTER 24 HOURS Resulted


 


 4/14/20 14:11


Nasopharynx Coronavirus COVID-19 PCR ([facility]) - Final Complete








Laboratory Tests


4/17/20 06:46: 


White Blood Count 4.9, Red Blood Count 3.27L, Hemoglobin 9.8L, Hematocrit 28.4L

, Mean Corpuscular Volume 87, Mean Corpuscular Hemoglobin 30.0, Mean 

Corpuscular Hemoglobin Concent 34.5, Red Cell Distribution Width 13.1, Platelet 

Count 113L, Mean Platelet Volume 7.5, Neutrophils (%) (Auto) , Lymphocytes (%) (

Auto) , Monocytes (%) (Auto) , Eosinophils (%) (Auto) , Basophils (%) (Auto) , 

Neutrophils % (Manual) [Pending], Lymphocytes % (Manual) [Pending], Platelet 

Estimate [Pending], Platelet Morphology [Pending], Sodium Level 135L, Potassium 

Level 4.7, Chloride Level 96L, Carbon Dioxide Level 31, Anion Gap 8, Blood Urea 

Nitrogen 44H, Creatinine 8.8H, Estimat Glomerular Filtration Rate 5.8, Glucose 

Level 90, Calcium Level 8.3L, Phosphorus Level 3.8, Magnesium Level 2.0, Total 

Bilirubin 0.6, Aspartate Amino Transf (AST/SGOT) 43H, Alanine Aminotransferase (

ALT/SGPT) 34, Alkaline Phosphatase 71, C-Reactive Protein, Quantitative 21.4H, 

Pro-B-Type Natriuretic Peptide 85816A, Total Protein 6.2L, Albumin 2.6L, 

Globulin 3.6, Albumin/Globulin Ratio 0.7L





Current Medications








 Medications


  (Trade)  Dose


 Ordered  Sig/Eliazar


 Route


 PRN Reason  Start Time


 Stop Time Status Last Admin


Dose Admin


 


 Acetaminophen


  (Tylenol)  500 mg  Q6H  PRN


 ORAL


 Temp >100.5  4/15/20 01:00


 5/15/20 00:59   


 


 


 Cinacalcet


  (Sensipar)  30 mg  DAILY


 ORAL


   4/15/20 09:00


 7/14/20 08:59  4/17/20 08:37


 


 


 Dextrose


  (Dextrose 50%)  25 ml  Q30M  PRN


 IV


 Hypoglycemia  4/15/20 01:15


 7/14/20 01:14   


 


 


 Dextrose


  (Dextrose 50%)  50 ml  Q30M  PRN


 IV


 Hypoglycemia  4/15/20 01:15


 7/14/20 01:14   


 


 


 Docusate Sodium


  (Colace)  100 mg  TWICE A  DAY


 ORAL


   4/15/20 09:00


 5/15/20 08:59  4/17/20 08:37


 


 


 Hydralazine HCl


  (Apresoline)  25 mg  Q4H  PRN


 ORAL


 bp over 160 syst  4/14/20 19:45


 7/13/20 19:44   


 


 


 Insulin Aspart


  (NovoLOG)    BEFORE MEALS AND  HS


 SUBQ


   4/15/20 06:30


 7/14/20 06:29   


 


 


 Labetalol HCl


  (Normodyne)  100 mg  DAILY


 ORAL


   4/15/20 09:00


 5/15/20 08:59  4/16/20 09:35


 


 


 Pantoprazole


  (Protonix)  40 mg  Q12HR


 ORAL


   4/14/20 21:00


 5/14/20 20:59  4/17/20 08:37


 


 


 Sevelamer


 Carbonate


  (Renvela)  800 mg  THREE TIMES A  DAY


 ORAL


   4/15/20 09:00


 7/14/20 08:59  4/17/20 08:37


 


 


 Vitamin B Complex/


 Vit C/Folic Acid


  (Nephrovite)  1 tab  DAILY


 ORAL


   4/15/20 09:00


 5/15/20 08:59  4/17/20 08:37


 

















Oh Solomon MD Apr 17, 2020 10:39

## 2020-04-17 NOTE — NUR
Verbal report to Tony DAN. Pt stable. For dialysis today. 71.5kg pre dialysis wt. 
Relinquished care of pt at this time.

## 2020-04-17 NOTE — NEPHROLOGY PROGRESS NOTE
Assessment/Plan


Problem List:  


(1) ESRD (end stage renal disease)


(2) Suspected COVID-19 virus infection


Assessment





End-stage renal disease on hemodialysis


Has arm fistula for dialysis-Next dialysis Wednesday, April 15


Dyspnea and shortness of breath, history of COPD, oxygen dependent at home


Exposure to COVID 19


Diabetes mellitus


Hypertension


Remote history of seizure


Anemia of kidney disease


Plan





COVID 19 test detected


Hemodialysis April 15 Next hemodialysis April 17


Keep the blood pressure and blood sugar in check


2D echocardiogram pending


Antibiotics and pulmonary support per consultants





Chest x-ray:


Findings: There is a left chest  HERO catheter, tip projected at the level of 

the


superior vena cava. Some atelectatic bands are seen in the left midlung. The 

lungs


and pleural spaces are otherwise clear. The heart size is upper limits of 

normal. A


stent is seen in the left axilla


Impression: Left midlung atelectasis. No acute process otherwise





Subjective


ROS Limited/Unobtainable:  No


Constitutional:  Reports: malaise





Objective


Objective





Last 24 Hour Vital Signs








  Date Time  Temp Pulse Resp B/P (MAP) Pulse Ox O2 Delivery O2 Flow Rate FiO2


 


4/17/20 08:45      Nasal Cannula 2.0 


 


4/17/20 04:00  57      


 


4/17/20 04:00 97.9  19 116/59 (78) 95   


 


4/17/20 00:00  59      


 


4/17/20 00:00 97.7  20 110/72 (85) 95   


 


4/16/20 21:00      Room Air  


 


4/16/20 20:00  65      


 


4/16/20 20:00 96.1 63 18 126/51 (76) 94   


 


4/16/20 16:00 96.6 69 20 134/63 (86) 98   


 


4/16/20 16:00  63      


 


4/16/20 12:00  71      


 


4/16/20 12:00 98.7 70 20 107/57 (74) 98   

















Intake and Output  


 


 4/16/20 4/17/20





 19:00 07:00


 


Intake Total 370 ml 118 ml


 


Output Total  0 ml


 


Balance 370 ml 118 ml


 


  


 


Intake Oral 370 ml 118 ml


 


Output Urine Total  0 ml


 


# Voids 2 


 


# Bowel Movements 1 








Laboratory Tests


4/17/20 06:46: 


White Blood Count 4.9, Red Blood Count 3.27L, Hemoglobin 9.8L, Hematocrit 28.4L

, Mean Corpuscular Volume 87, Mean Corpuscular Hemoglobin 30.0, Mean 

Corpuscular Hemoglobin Concent 34.5, Red Cell Distribution Width 13.1, Platelet 

Count 113L, Mean Platelet Volume 7.5, Neutrophils (%) (Auto) , Lymphocytes (%) (

Auto) , Monocytes (%) (Auto) , Eosinophils (%) (Auto) , Basophils (%) (Auto) , 

Neutrophils % (Manual) [Pending], Lymphocytes % (Manual) [Pending], Platelet 

Estimate [Pending], Platelet Morphology [Pending], Sodium Level 135L, Potassium 

Level 4.7, Chloride Level 96L, Carbon Dioxide Level 31, Anion Gap 8, Blood Urea 

Nitrogen 44H, Creatinine 8.8H, Estimat Glomerular Filtration Rate 5.8, Glucose 

Level 90, Calcium Level 8.3L, Phosphorus Level 3.8, Magnesium Level 2.0, Total 

Bilirubin 0.6, Aspartate Amino Transf (AST/SGOT) 43H, Alanine Aminotransferase (

ALT/SGPT) 34, Alkaline Phosphatase 71, C-Reactive Protein, Quantitative 21.4H, 

Pro-B-Type Natriuretic Peptide 72623Y, Total Protein 6.2L, Albumin 2.6L, 

Globulin 3.6, Albumin/Globulin Ratio 0.7L


Height (Feet):  5


Height (Inches):  5.00


Weight (Pounds):  156


General Appearance:  no apparent distress


Objective


no change











Vik Morrison MD Apr 17, 2020 10:26

## 2020-04-17 NOTE — INFECTIOUS DISEASES PROG NOTE
Assessment/Plan


Assessment/Plan


IMPRESSION:


1. Fever resolved


2. COVID-19 disease.likely mid disease


3. COPD.


4. End-stage renal disease on hemodialysis.


5. Diabetes mellitus.


6. Hypertension.


7. Anemia.





RECOMMENDATION:  


We will observe off antibiotic.





Subjective


ROS Limited/Unobtainable:  Yes


Constitutional:  Reports: no symptoms


Respiratory:  Reports: no symptoms


Gastrointestinal/Abdominal:  Reports: no symptoms


Genitourinary:  Reports: no symptoms


Allergies:  


Coded Allergies:  


     No Known Allergies (Unverified , 4/16/13)





Objective


Vital Signs





Last 24 Hour Vital Signs








  Date Time  Temp Pulse Resp B/P (MAP) Pulse Ox O2 Delivery O2 Flow Rate FiO2


 


4/17/20 08:45      Nasal Cannula 2.0 


 


4/17/20 08:00 99.1  20 132/48 (76) 60   


 


4/17/20 08:00  61      


 


4/17/20 04:00  57      


 


4/17/20 04:00 97.9  19 116/59 (78) 95   


 


4/17/20 00:00  59      


 


4/17/20 00:00 97.7  20 110/72 (85) 95   


 


4/16/20 21:00      Room Air  


 


4/16/20 20:00  65      


 


4/16/20 20:00 96.1 63 18 126/51 (76) 94   


 


4/16/20 16:00 96.6 69 20 134/63 (86) 98   


 


4/16/20 16:00  63      


 


4/16/20 12:00  71      


 


4/16/20 12:00 98.7 70 20 107/57 (74) 98   








Height (Feet):  5


Height (Inches):  5.00


Weight (Pounds):  156


General Appearance:  no acute distress


HEENT:  mucous membranes moist


Respiratory/Chest:  lungs clear


Cardiovascular:  normal rate


Abdomen:  soft, non tender


Extremities:  no edema


Neurologic/Psychiatric:  alert, responsive





Microbiology








 Date/Time


Source Procedure


Growth Status


 


 


 4/14/20 13:39


Blood Blood Culture - Preliminary


NO GROWTH AFTER 24 HOURS Resulted


 


 4/14/20 13:24


Blood Blood Culture - Preliminary


NO GROWTH AFTER 24 HOURS Resulted


 


 4/14/20 14:11


Nasopharynx Coronavirus COVID-19 PCR ([facility]) - Final Complete











Laboratory Tests








Test


  4/17/20


06:46


 


White Blood Count


  4.9 K/UL


(4.8-10.8)


 


Red Blood Count


  3.27 M/UL


(4.70-6.10)  L


 


Hemoglobin


  9.8 G/DL


(14.2-18.0)  L


 


Hematocrit


  28.4 %


(42.0-52.0)  L


 


Mean Corpuscular Volume 87 FL (80-99)  


 


Mean Corpuscular Hemoglobin


  30.0 PG


(27.0-31.0)


 


Mean Corpuscular Hemoglobin


Concent 34.5 G/DL


(32.0-36.0)


 


Red Cell Distribution Width


  13.1 %


(11.6-14.8)


 


Platelet Count


  113 K/UL


(150-450)  L


 


Mean Platelet Volume


  7.5 FL


(6.5-10.1)


 


Neutrophils (%) (Auto)


  % (45.0-75.0)


 


 


Lymphocytes (%) (Auto)


  % (20.0-45.0)


 


 


Monocytes (%) (Auto)  % (1.0-10.0)  


 


Eosinophils (%) (Auto)  % (0.0-3.0)  


 


Basophils (%) (Auto)  % (0.0-2.0)  


 


Differential Total Cells


Counted 100  


 


 


Neutrophils % (Manual) 73 % (45-75)  


 


Lymphocytes % (Manual) 18 % (20-45)  L


 


Monocytes % (Manual) 9 % (1-10)  


 


Eosinophils % (Manual) 0 % (0-3)  


 


Basophils % (Manual) 0 % (0-2)  


 


Band Neutrophils 0 % (0-8)  


 


Platelet Estimate Decreased  L


 


Platelet Morphology Normal  


 


Red Blood Cell Morphology Normal  


 


Sodium Level


  135 MMOL/L


(136-145)  L


 


Potassium Level


  4.7 MMOL/L


(3.5-5.1)


 


Chloride Level


  96 MMOL/L


()  L


 


Carbon Dioxide Level


  31 MMOL/L


(21-32)


 


Anion Gap


  8 mmol/L


(5-15)


 


Blood Urea Nitrogen


  44 mg/dL


(7-18)  H


 


Creatinine


  8.8 MG/DL


(0.55-1.30)  H


 


Estimat Glomerular Filtration


Rate 5.8 mL/min


(>60)


 


Glucose Level


  90 MG/DL


()


 


Calcium Level


  8.3 MG/DL


(8.5-10.1)  L


 


Phosphorus Level


  3.8 MG/DL


(2.5-4.9)


 


Magnesium Level


  2.0 MG/DL


(1.8-2.4)


 


Total Bilirubin


  0.6 MG/DL


(0.2-1.0)


 


Aspartate Amino Transf


(AST/SGOT) 43 U/L (15-37)


H


 


Alanine Aminotransferase


(ALT/SGPT) 34 U/L (12-78)


 


 


Alkaline Phosphatase


  71 U/L


()


 


C-Reactive Protein,


Quantitative 21.4 mg/dL


(0.00-0.90)  H


 


Pro-B-Type Natriuretic Peptide


  50891 pg/mL


(0-125)  H


 


Total Protein


  6.2 G/DL


(6.4-8.2)  L


 


Albumin


  2.6 G/DL


(3.4-5.0)  L


 


Globulin 3.6 g/dL  


 


Albumin/Globulin Ratio


  0.7 (1.0-2.7)


L











Current Medications








 Medications


  (Trade)  Dose


 Ordered  Sig/Eliazar


 Route


 PRN Reason  Start Time


 Stop Time Status Last Admin


Dose Admin


 


 Acetaminophen


  (Tylenol)  500 mg  Q6H  PRN


 ORAL


 Temp >100.5  4/15/20 01:00


 5/15/20 00:59   


 


 


 Cinacalcet


  (Sensipar)  30 mg  DAILY


 ORAL


   4/15/20 09:00


 7/14/20 08:59  4/17/20 08:37


 


 


 Dextrose


  (Dextrose 50%)  25 ml  Q30M  PRN


 IV


 Hypoglycemia  4/15/20 01:15


 7/14/20 01:14   


 


 


 Dextrose


  (Dextrose 50%)  50 ml  Q30M  PRN


 IV


 Hypoglycemia  4/15/20 01:15


 7/14/20 01:14   


 


 


 Docusate Sodium


  (Colace)  100 mg  TWICE A  DAY


 ORAL


   4/15/20 09:00


 5/15/20 08:59  4/17/20 08:37


 


 


 Hydralazine HCl


  (Apresoline)  25 mg  Q4H  PRN


 ORAL


 bp over 160 syst  4/14/20 19:45


 7/13/20 19:44   


 


 


 Insulin Aspart


  (NovoLOG)    BEFORE MEALS AND  HS


 SUBQ


   4/15/20 06:30


 7/14/20 06:29   


 


 


 Labetalol HCl


  (Normodyne)  100 mg  DAILY


 ORAL


   4/15/20 09:00


 5/15/20 08:59  4/16/20 09:35


 


 


 Pantoprazole


  (Protonix)  40 mg  Q12HR


 ORAL


   4/14/20 21:00


 5/14/20 20:59  4/17/20 08:37


 


 


 Sevelamer


 Carbonate


  (Renvela)  800 mg  THREE TIMES A  DAY


 ORAL


   4/15/20 09:00


 7/14/20 08:59  4/17/20 08:37


 


 


 Vitamin B Complex/


 Vit C/Folic Acid


  (Nephrovite)  1 tab  DAILY


 ORAL


   4/15/20 09:00


 5/15/20 08:59  4/17/20 08:37


 

















Jake Pfeiffer MD Apr 17, 2020 11:54

## 2020-04-17 NOTE — NUR
NURSE NOTES:Handoff received from NI Schumacher. Patient received awake and alert and able to 
make needs known, Patient mainly Hebrew speaking. No acute signs of distress noted, no pain 
at this time. Patient has left arm fistula, left arm precaution. Right hand IV site is clean 
dry and intact, saline locked. Bed in the low and locked position with call light within 
reach, will continue to monitor patient.

## 2020-04-17 NOTE — GENERAL PROGRESS NOTE
Assessment/Plan


Problem List:  


(1) Hyperkalemia


ICD Codes:  E87.5 - Hyperkalemia


SNOMED:  42634006


(2) ESRD (end stage renal disease)


ICD Codes:  N18.6 - End stage renal disease


SNOMED:  41482600


(3) Suspected COVID-19 virus infection


ICD Codes:  R68.89 - Other general symptoms and signs


SNOMED:  757410106


Status:  progressing


Assessment/Plan:


covid positive


esrd on hd


pna


respiratory insuff


do supportive therapy





Subjective


ROS Limited/Unobtainable:  Yes


Allergies:  


Coded Allergies:  


     No Known Allergies (Unverified , 4/16/13)





Objective





Last 24 Hour Vital Signs








  Date Time  Temp Pulse Resp B/P (MAP) Pulse Ox O2 Delivery O2 Flow Rate FiO2


 


4/17/20 16:00 98.0 99 20 117/48 (71) 97   


 


4/17/20 12:00  58      


 


4/17/20 12:00 98.2 58 18 135/54 (81) 96   


 


4/17/20 08:45      Nasal Cannula 2.0 


 


4/17/20 08:00 99.1  20 132/48 (76) 60   


 


4/17/20 08:00  61      


 


4/17/20 04:00  57      


 


4/17/20 04:00 97.9  19 116/59 (78) 95   


 


4/17/20 00:00  59      


 


4/17/20 00:00 97.7  20 110/72 (85) 95   

















Intake and Output  


 


 4/16/20 4/17/20





 19:00 07:00


 


Intake Total 370 ml 118 ml


 


Output Total  0 ml


 


Balance 370 ml 118 ml


 


  


 


Intake Oral 370 ml 118 ml


 


Output Urine Total  0 ml


 


# Voids 2 


 


# Bowel Movements 1 








Laboratory Tests


4/17/20 06:46: 


White Blood Count 4.9, Red Blood Count 3.27L, Hemoglobin 9.8L, Hematocrit 28.4L

, Mean Corpuscular Volume 87, Mean Corpuscular Hemoglobin 30.0, Mean 

Corpuscular Hemoglobin Concent 34.5, Red Cell Distribution Width 13.1, Platelet 

Count 113L, Mean Platelet Volume 7.5, Neutrophils (%) (Auto) , Lymphocytes (%) (

Auto) , Monocytes (%) (Auto) , Eosinophils (%) (Auto) , Basophils (%) (Auto) , 

Differential Total Cells Counted 100, Neutrophils % (Manual) 73, Lymphocytes % (

Manual) 18L, Monocytes % (Manual) 9, Eosinophils % (Manual) 0, Basophils % (

Manual) 0, Band Neutrophils 0, Platelet Estimate DecreasedL, Platelet 

Morphology Normal, Red Blood Cell Morphology Normal, Sodium Level 135L, 

Potassium Level 4.7, Chloride Level 96L, Carbon Dioxide Level 31, Anion Gap 8, 

Blood Urea Nitrogen 44H, Creatinine 8.8H, Estimat Glomerular Filtration Rate 5.8

, Glucose Level 90, Calcium Level 8.3L, Phosphorus Level 3.8, Magnesium Level 

2.0, Total Bilirubin 0.6, Aspartate Amino Transf (AST/SGOT) 43H, Alanine 

Aminotransferase (ALT/SGPT) 34, Alkaline Phosphatase 71, C-Reactive Protein, 

Quantitative 21.4H, Pro-B-Type Natriuretic Peptide 29873S, Total Protein 6.2L, 

Albumin 2.6L, Globulin 3.6, Albumin/Globulin Ratio 0.7L


Height (Feet):  5


Height (Inches):  5.00


Weight (Pounds):  156











Van Silva MD Apr 17, 2020 21:31

## 2020-04-18 VITALS — SYSTOLIC BLOOD PRESSURE: 119 MMHG | DIASTOLIC BLOOD PRESSURE: 48 MMHG

## 2020-04-18 VITALS — DIASTOLIC BLOOD PRESSURE: 63 MMHG | SYSTOLIC BLOOD PRESSURE: 124 MMHG

## 2020-04-18 VITALS — SYSTOLIC BLOOD PRESSURE: 110 MMHG | DIASTOLIC BLOOD PRESSURE: 54 MMHG

## 2020-04-18 VITALS — DIASTOLIC BLOOD PRESSURE: 57 MMHG | SYSTOLIC BLOOD PRESSURE: 115 MMHG

## 2020-04-18 VITALS — DIASTOLIC BLOOD PRESSURE: 55 MMHG | SYSTOLIC BLOOD PRESSURE: 135 MMHG

## 2020-04-18 VITALS — SYSTOLIC BLOOD PRESSURE: 141 MMHG | DIASTOLIC BLOOD PRESSURE: 57 MMHG

## 2020-04-18 RX ADMIN — INSULIN ASPART SCH UNITS: 100 INJECTION, SOLUTION INTRAVENOUS; SUBCUTANEOUS at 21:00

## 2020-04-18 RX ADMIN — SEVELAMER CARBONATE SCH MG: 800 POWDER, FOR SUSPENSION ORAL at 08:11

## 2020-04-18 RX ADMIN — DOCUSATE SODIUM SCH MG: 100 CAPSULE, LIQUID FILLED ORAL at 08:10

## 2020-04-18 RX ADMIN — DOCUSATE SODIUM SCH MG: 100 CAPSULE, LIQUID FILLED ORAL at 17:15

## 2020-04-18 RX ADMIN — INSULIN ASPART SCH UNITS: 100 INJECTION, SOLUTION INTRAVENOUS; SUBCUTANEOUS at 16:16

## 2020-04-18 RX ADMIN — Medication SCH TAB: at 08:10

## 2020-04-18 RX ADMIN — SEVELAMER CARBONATE SCH MG: 800 POWDER, FOR SUSPENSION ORAL at 14:20

## 2020-04-18 RX ADMIN — CINACALCET HYDROCHLORIDE SCH MG: 30 TABLET, COATED ORAL at 08:10

## 2020-04-18 RX ADMIN — INSULIN ASPART SCH UNITS: 100 INJECTION, SOLUTION INTRAVENOUS; SUBCUTANEOUS at 11:30

## 2020-04-18 RX ADMIN — SEVELAMER CARBONATE SCH MG: 800 POWDER, FOR SUSPENSION ORAL at 17:15

## 2020-04-18 RX ADMIN — ACETAMINOPHEN PRN MG: 500 TABLET, FILM COATED ORAL at 23:22

## 2020-04-18 RX ADMIN — INSULIN ASPART SCH UNITS: 100 INJECTION, SOLUTION INTRAVENOUS; SUBCUTANEOUS at 06:12

## 2020-04-18 NOTE — NUR
RD ASSESSMENT & RECOMMENDATIONS

SEE CARE ACTIVITY FOR COMPLETE ASSESSMENT



DAILY ESTIMATED NEEDS:

Needs based on ESRD + HD/ 64kg abw 

25-30  kcals/kg 

9286-9151  total kcals

1.2-1.8  g protein/kg

  g total protein 

Fluids per MD, pt on HD  







NUTRITION DIAGNOSIS:

Altered nutrition related lab values R/T ESRD, h/o DM as evidenced by elev

creat (8.8), elev BNP (85808), elev A1C 5.9.







CURRENT DIET:RENAL     







 

PO DIET RECOMMENDATIONS:

RENAL + CCHO MED/ texture as tolerated 









ADDITIONAL RECOMMENDATIONS:

* Daily calibrated bedscale wt 

* Monitor BGs, need for hypoglycemics: h/o DM 

* Add Nepro x 1 w/ variable PO intake (425kcal/19g prot each) 

* Monitor lytes

## 2020-04-18 NOTE — NUR
NURSE NOTES:Handoff received from [facility]. Patient received awake and alert and able to 
make needs known, Patient mainly Citizen of [place] speaking. No acute signs of distress noted, no pain 
at this time. Patient has left arm fistula, left arm precaution. Right hand IV site is clean 
dry and intact, saline locked. Bed in the low and locked position with call light within 
reach, Patient is on droplet and contact precautions as patient is Covid +. will continue to 
monitor patient.

## 2020-04-18 NOTE — NEPHROLOGY PROGRESS NOTE
Assessment/Plan


Problem List:  


(1) ESRD (end stage renal disease)


(2) Suspected COVID-19 virus infection


Assessment





End-stage renal disease on hemodialysis


Has arm fistula for dialysis-Next dialysis Wednesday, April 15


Dyspnea and shortness of breath, history of COPD, oxygen dependent at home


Exposure to COVID 19


Diabetes mellitus


Hypertension


Remote history of seizure


Anemia of kidney disease


Plan





COVID 19 test detected





Hemodialysis April 15 Next hemodialysis April 17


Keep the blood pressure and blood sugar in check


2D echocardiogram pending


Antibiotics and pulmonary support per consultants





Chest x-ray:


Findings: There is a left chest  HERO catheter, tip projected at the level of 

the


superior vena cava. Some atelectatic bands are seen in the left midlung. The 

lungs


and pleural spaces are otherwise clear. The heart size is upper limits of 

normal. A


stent is seen in the left axilla


Impression: Left midlung atelectasis. No acute process otherwise





Subjective


ROS Limited/Unobtainable:  No


Constitutional:  Reports: malaise





Objective


Objective





Last 24 Hour Vital Signs








  Date Time  Temp Pulse Resp B/P (MAP) Pulse Ox O2 Delivery O2 Flow Rate FiO2


 


4/18/20 08:18      Nasal Cannula 2.0 


 


4/18/20 08:11  58  110/54    


 


4/18/20 08:00  81      


 


4/18/20 07:50 98.0 58 20 110/54 (72) 98   


 


4/18/20 04:00  73      


 


4/18/20 04:00      Nasal Cannula 2.0 


 


4/18/20 04:00 99.6 76 16 119/48 (71) 99   


 


4/18/20 00:00 98.0 84 22 135/55 (81) 99   


 


4/18/20 00:00      Nasal Cannula 2.0 


 


4/18/20 00:00  78      


 


4/17/20 21:00      Nasal Cannula 2.0 


 


4/17/20 20:00  105      


 


4/17/20 20:00 99.5 97 18 132/53 (79) 96   


 


4/17/20 16:00 98.0 99 20 117/48 (71) 97   


 


4/17/20 12:00  58      


 


4/17/20 12:00 98.2 58 18 135/54 (81) 96   

















Intake and Output  


 


 4/17/20 4/18/20





 19:00 07:00


 


Intake Total 60 ml 300 ml


 


Output Total 2000 ml 


 


Balance -1940 ml 300 ml


 


  


 


Intake Oral 60 ml 300 ml


 


Hemodialysis UF 2000 ml 








No labs drawn for today


Height (Feet):  5


Height (Inches):  5.00


Weight (Pounds):  156


General Appearance:  no apparent distress, lethargic


Cardiovascular:  normal rate, bradycardia


Respiratory/Chest:  decreased breath sounds


Abdomen:  soft


Objective


no change











Vik Morrison MD Apr 18, 2020 12:00

## 2020-04-18 NOTE — NUR
NURSE NOTES: BLOOD GLUCOSE MONITORED VIA GLUCOMETER WITH RESULT 103MG/DL, ASYMPTOMATIC, NO 
SLIDING SCALE INSULIN ADM.

## 2020-04-18 NOTE — GENERAL PROGRESS NOTE
Assessment/Plan


Problem List:  


(1) Hyperkalemia


ICD Codes:  E87.5 - Hyperkalemia


SNOMED:  58569877


(2) ESRD (end stage renal disease)


ICD Codes:  N18.6 - End stage renal disease


SNOMED:  60894213


(3) Suspected COVID-19 virus infection


ICD Codes:  R68.89 - Other general symptoms and signs


SNOMED:  184693240


Status:  progressing


Assessment/Plan:


covid positive


esrd on hd


pna


respiratory insuff


labs stable


afebrile


no sob





Subjective


ROS Limited/Unobtainable:  Yes


Allergies:  


Coded Allergies:  


     No Known Allergies (Unverified , 4/16/13)





Objective





Last 24 Hour Vital Signs








  Date Time  Temp Pulse Resp B/P (MAP) Pulse Ox O2 Delivery O2 Flow Rate FiO2


 


4/18/20 08:18      Nasal Cannula 2.0 


 


4/18/20 08:11  58  110/54    


 


4/18/20 08:00  81      


 


4/18/20 07:50 98.0 58 20 110/54 (72) 98   


 


4/18/20 04:00  73      


 


4/18/20 04:00      Nasal Cannula 2.0 


 


4/18/20 04:00 99.6 76 16 119/48 (71) 99   


 


4/18/20 00:00 98.0 84 22 135/55 (81) 99   


 


4/18/20 00:00      Nasal Cannula 2.0 


 


4/18/20 00:00  78      


 


4/17/20 21:00      Nasal Cannula 2.0 


 


4/17/20 20:00  105      


 


4/17/20 20:00 99.5 97 18 132/53 (79) 96   


 


4/17/20 16:00 98.0 99 20 117/48 (71) 97   


 


4/17/20 12:00  58      


 


4/17/20 12:00 98.2 58 18 135/54 (81) 96   

















Intake and Output  


 


 4/17/20 4/18/20





 19:00 07:00


 


Intake Total 60 ml 300 ml


 


Output Total 2000 ml 


 


Balance -1940 ml 300 ml


 


  


 


Intake Oral 60 ml 300 ml


 


Hemodialysis UF 2000 ml 








Height (Feet):  5


Height (Inches):  5.00


Weight (Pounds):  156











Van Silva MD Apr 18, 2020 10:34

## 2020-04-18 NOTE — PULMONOLOGY PROGRESS NOTE
Assessment/Plan


Assessment/Plan


IMPRESSION:


1. History of COVID-19 exposure. Is + COVID 19 here


2. ESRD, on dialysis.


3. Diabetes mellitus.


4. Dyspnea.





DISCUSSION:  





Consider use of Plaquenil or azithromycin only if patient becomes hypoxic.


Currently SaO2 95% on RA alternating with 2L/min O2


Currently, he is doing well.  I would avoid unnecessary antibiotics.


Agree with dialysis.  I will follow carefully.














  ______________________________________________


  Oh Solomon M.D.





Subjective


Interval Events:  Is + COVID 19


Constitutional:  Reports: no symptoms


HEENT:  Repors: no symptoms


Respiratory:  Reports: no symptoms


Cardiovascular:  Reports: no symptoms


Gastrointestinal/Abdominal:  Reports: no symptoms


Allergies:  


Coded Allergies:  


     No Known Allergies (Unverified , 4/16/13)





Objective





Last 24 Hour Vital Signs








  Date Time  Temp Pulse Resp B/P (MAP) Pulse Ox O2 Delivery O2 Flow Rate FiO2


 


4/18/20 12:00  73      


 


4/18/20 08:18      Nasal Cannula 2.0 


 


4/18/20 08:11  58  110/54    


 


4/18/20 08:00  81      


 


4/18/20 07:50 98.0 58 20 110/54 (72) 98   


 


4/18/20 04:00  73      


 


4/18/20 04:00      Nasal Cannula 2.0 


 


4/18/20 04:00 99.6 76 16 119/48 (71) 99   


 


4/18/20 00:00 98.0 84 22 135/55 (81) 99   


 


4/18/20 00:00      Nasal Cannula 2.0 


 


4/18/20 00:00  78      


 


4/17/20 21:00      Nasal Cannula 2.0 


 


4/17/20 20:00  105      


 


4/17/20 20:00 99.5 97 18 132/53 (79) 96   


 


4/17/20 16:00 98.0 99 20 117/48 (71) 97   

















Intake and Output  


 


 4/17/20 4/18/20





 19:00 07:00


 


Intake Total 60 ml 300 ml


 


Output Total 2000 ml 


 


Balance -1940 ml 300 ml


 


  


 


Intake Oral 60 ml 300 ml


 


Hemodialysis UF 2000 ml 








General Appearance:  no acute distress


HEENT:  normocephalic


Respiratory/Chest:  chest wall non-tender


Cardiovascular:  normal peripheral pulses


Abdomen:  normal bowel sounds





Current Medications








 Medications


  (Trade)  Dose


 Ordered  Sig/Eliazar


 Route


 PRN Reason  Start Time


 Stop Time Status Last Admin


Dose Admin


 


 Acetaminophen


  (Tylenol)  500 mg  Q6H  PRN


 ORAL


 Temp >100.5  4/15/20 01:00


 5/15/20 00:59   


 


 


 Cinacalcet


  (Sensipar)  30 mg  DAILY


 ORAL


   4/15/20 09:00


 7/14/20 08:59  4/18/20 08:10


 


 


 Dextrose


  (Dextrose 50%)  25 ml  Q30M  PRN


 IV


 Hypoglycemia  4/15/20 01:15


 7/14/20 01:14   


 


 


 Dextrose


  (Dextrose 50%)  50 ml  Q30M  PRN


 IV


 Hypoglycemia  4/15/20 01:15


 7/14/20 01:14   


 


 


 Docusate Sodium


  (Colace)  100 mg  TWICE A  DAY


 ORAL


   4/15/20 09:00


 5/15/20 08:59  4/18/20 08:10


 


 


 Hydralazine HCl


  (Apresoline)  25 mg  Q4H  PRN


 ORAL


 bp over 160 syst  4/14/20 19:45


 7/13/20 19:44   


 


 


 Insulin Aspart


  (NovoLOG)    BEFORE MEALS AND  HS


 SUBQ


   4/15/20 06:30


 7/14/20 06:29   


 


 


 Labetalol HCl


  (Normodyne)  100 mg  DAILY


 ORAL


   4/15/20 09:00


 5/15/20 08:59  4/16/20 09:35


 


 


 Pantoprazole


  (Protonix)  40 mg  Q12HR


 ORAL


   4/14/20 21:00


 5/14/20 20:59  4/18/20 08:10


 


 


 Sevelamer


 Carbonate


  (Renvela)  800 mg  THREE TIMES A  DAY


 ORAL


   4/15/20 09:00


 7/14/20 08:59  4/18/20 08:11


 


 


 Vitamin B Complex/


 Vit C/Folic Acid


  (Nephrovite)  1 tab  DAILY


 ORAL


   4/15/20 09:00


 5/15/20 08:59  4/18/20 08:10


 

















Oh Solomon MD Apr 18, 2020 12:25

## 2020-04-18 NOTE — NEPHROLOGY PROGRESS NOTE
Assessment/Plan


Problem List:  


(1) ESRD (end stage renal disease)


ICD Codes:  N18.6 - End stage renal disease


SNOMED:  06786977


(2) Suspected COVID-19 virus infection


ICD Codes:  R68.89 - Other general symptoms and signs


SNOMED:  877288430


(3) Hyperkalemia


ICD Codes:  E87.5 - Hyperkalemia


SNOMED:  05748883


(4) COVID-19 virus infection


ICD Codes:  U07.1 - COVID-19


SNOMED:  810589414


Status:  progressing





Subjective


Allergies:  


Coded Allergies:  


     No Known Allergies (Unverified , 4/16/13)





Objective





Last 24 Hour Vital Signs








  Date Time  Temp Pulse Resp B/P (MAP) Pulse Ox O2 Delivery O2 Flow Rate FiO2


 


4/18/20 12:00  73      


 


4/18/20 08:18      Nasal Cannula 2.0 


 


4/18/20 08:11  58  110/54    


 


4/18/20 08:00  81      


 


4/18/20 07:50 98.0 58 20 110/54 (72) 98   


 


4/18/20 04:00  73      


 


4/18/20 04:00      Nasal Cannula 2.0 


 


4/18/20 04:00 99.6 76 16 119/48 (71) 99   


 


4/18/20 00:00 98.0 84 22 135/55 (81) 99   


 


4/18/20 00:00      Nasal Cannula 2.0 


 


4/18/20 00:00  78      


 


4/17/20 21:00      Nasal Cannula 2.0 


 


4/17/20 20:00  105      


 


4/17/20 20:00 99.5 97 18 132/53 (79) 96   


 


4/17/20 16:00 98.0 99 20 117/48 (71) 97   

















Intake and Output  


 


 4/17/20 4/18/20





 19:00 07:00


 


Intake Total 60 ml 300 ml


 


Output Total 2000 ml 


 


Balance -1940 ml 300 ml


 


  


 


Intake Oral 60 ml 300 ml


 


Hemodialysis UF 2000 ml 








Height (Feet):  5


Height (Inches):  5.00


Weight (Pounds):  156











Vik Morrison MD Apr 18, 2020 12:13

## 2020-04-19 VITALS — DIASTOLIC BLOOD PRESSURE: 55 MMHG | SYSTOLIC BLOOD PRESSURE: 111 MMHG

## 2020-04-19 VITALS — SYSTOLIC BLOOD PRESSURE: 159 MMHG | DIASTOLIC BLOOD PRESSURE: 59 MMHG

## 2020-04-19 VITALS — DIASTOLIC BLOOD PRESSURE: 52 MMHG | SYSTOLIC BLOOD PRESSURE: 131 MMHG

## 2020-04-19 VITALS — DIASTOLIC BLOOD PRESSURE: 53 MMHG | SYSTOLIC BLOOD PRESSURE: 119 MMHG

## 2020-04-19 LAB
ADD MANUAL DIFF: NO
ALBUMIN SERPL-MCNC: 2.8 G/DL (ref 3.4–5)
ALBUMIN/GLOB SERPL: 0.7 {RATIO} (ref 1–2.7)
ALP SERPL-CCNC: 82 U/L (ref 46–116)
ALT SERPL-CCNC: 42 U/L (ref 12–78)
ANION GAP SERPL CALC-SCNC: 14 MMOL/L (ref 5–15)
AST SERPL-CCNC: 34 U/L (ref 15–37)
BASOPHILS NFR BLD AUTO: 0.5 % (ref 0–2)
BILIRUB SERPL-MCNC: 0.8 MG/DL (ref 0.2–1)
BUN SERPL-MCNC: 50 MG/DL (ref 7–18)
CALCIUM SERPL-MCNC: 8 MG/DL (ref 8.5–10.1)
CHLORIDE SERPL-SCNC: 96 MMOL/L (ref 98–107)
CO2 SERPL-SCNC: 27 MMOL/L (ref 21–32)
CREAT SERPL-MCNC: 9.5 MG/DL (ref 0.55–1.3)
EOSINOPHIL NFR BLD AUTO: 1.1 % (ref 0–3)
ERYTHROCYTE [DISTWIDTH] IN BLOOD BY AUTOMATED COUNT: 13 % (ref 11.6–14.8)
GLOBULIN SER-MCNC: 4.3 G/DL
HCT VFR BLD CALC: 31.3 % (ref 42–52)
HGB BLD-MCNC: 10.2 G/DL (ref 14.2–18)
LYMPHOCYTES NFR BLD AUTO: 13.9 % (ref 20–45)
MCV RBC AUTO: 87 FL (ref 80–99)
MONOCYTES NFR BLD AUTO: 7.8 % (ref 1–10)
NEUTROPHILS NFR BLD AUTO: 76.6 % (ref 45–75)
PHOSPHATE SERPL-MCNC: 3.8 MG/DL (ref 2.5–4.9)
PLATELET # BLD: 121 K/UL (ref 150–450)
POTASSIUM SERPL-SCNC: 4.3 MMOL/L (ref 3.5–5.1)
RBC # BLD AUTO: 3.58 M/UL (ref 4.7–6.1)
SODIUM SERPL-SCNC: 137 MMOL/L (ref 136–145)
WBC # BLD AUTO: 8.1 K/UL (ref 4.8–10.8)

## 2020-04-19 RX ADMIN — CINACALCET HYDROCHLORIDE SCH MG: 30 TABLET, COATED ORAL at 09:50

## 2020-04-19 RX ADMIN — Medication SCH TAB: at 09:50

## 2020-04-19 RX ADMIN — DOCUSATE SODIUM SCH MG: 100 CAPSULE, LIQUID FILLED ORAL at 17:08

## 2020-04-19 RX ADMIN — SEVELAMER CARBONATE SCH MG: 800 POWDER, FOR SUSPENSION ORAL at 09:50

## 2020-04-19 RX ADMIN — SEVELAMER CARBONATE SCH MG: 800 POWDER, FOR SUSPENSION ORAL at 17:08

## 2020-04-19 RX ADMIN — INSULIN ASPART SCH UNITS: 100 INJECTION, SOLUTION INTRAVENOUS; SUBCUTANEOUS at 11:30

## 2020-04-19 RX ADMIN — INSULIN ASPART SCH UNITS: 100 INJECTION, SOLUTION INTRAVENOUS; SUBCUTANEOUS at 16:30

## 2020-04-19 RX ADMIN — INSULIN ASPART SCH UNITS: 100 INJECTION, SOLUTION INTRAVENOUS; SUBCUTANEOUS at 06:30

## 2020-04-19 RX ADMIN — SEVELAMER CARBONATE SCH MG: 800 POWDER, FOR SUSPENSION ORAL at 13:42

## 2020-04-19 RX ADMIN — DOCUSATE SODIUM SCH MG: 100 CAPSULE, LIQUID FILLED ORAL at 09:50

## 2020-04-19 RX ADMIN — INSULIN ASPART SCH UNITS: 100 INJECTION, SOLUTION INTRAVENOUS; SUBCUTANEOUS at 21:00

## 2020-04-19 NOTE — GENERAL PROGRESS NOTE
Assessment/Plan


Problem List:  


(1) Hyperkalemia


ICD Codes:  E87.5 - Hyperkalemia


SNOMED:  66210047


(2) ESRD (end stage renal disease)


ICD Codes:  N18.6 - End stage renal disease


SNOMED:  28986894


(3) Suspected COVID-19 virus infection


ICD Codes:  R68.89 - Other general symptoms and signs


SNOMED:  600045096


Status:  progressing


Assessment/Plan:


covid positive


esrd on hd


pna


respiratory insuff


not hypoxic


lytes normal


afebrile





Subjective


ROS Limited/Unobtainable:  Yes


Allergies:  


Coded Allergies:  


     No Known Allergies (Unverified , 4/16/13)





Objective





Last 24 Hour Vital Signs








  Date Time  Temp Pulse Resp B/P (MAP) Pulse Ox O2 Delivery O2 Flow Rate FiO2


 


4/19/20 16:00  62      


 


4/19/20 12:00  75      


 


4/19/20 12:00 97.9 67 18 111/55 (73) 91   


 


4/19/20 09:51  71  131/52    


 


4/19/20 09:00      Nasal Cannula 2.0 


 


4/19/20 08:00 98.3 70 19 131/52 (78) 92   


 


4/19/20 08:00  71      


 


4/19/20 04:00 97.9  17 119/53 (75) 90   


 


4/19/20 04:00  68      


 


4/19/20 00:00 98.6 77 19 159/59 (92) 92   


 


4/19/20 00:00  75      


 


4/18/20 21:00      Nasal Cannula 2.0 


 


4/18/20 20:00 98.4  18 141/57 (85) 95   

















Intake and Output  


 


 4/18/20 4/19/20





 19:00 07:00


 


Intake Total 320 ml 200 ml


 


Balance 320 ml 200 ml


 


  


 


Intake Oral 320 ml 200 ml


 


# Bowel Movements 1 








Laboratory Tests


4/19/20 12:05: 


White Blood Count 8.1, Red Blood Count 3.58L, Hemoglobin 10.2L, Hematocrit 31.3L

, Mean Corpuscular Volume 87, Mean Corpuscular Hemoglobin 28.4, Mean 

Corpuscular Hemoglobin Concent 32.5, Red Cell Distribution Width 13.0, Platelet 

Count 121L, Mean Platelet Volume 7.8, Neutrophils (%) (Auto) 76.6H, Lymphocytes 

(%) (Auto) 13.9L, Monocytes (%) (Auto) 7.8, Eosinophils (%) (Auto) 1.1, 

Basophils (%) (Auto) 0.5, D-Dimer 1.70H, Sodium Level 137, Potassium Level 4.3, 

Chloride Level 96L, Carbon Dioxide Level 27, Anion Gap 14, Blood Urea Nitrogen 

50H, Creatinine 9.5H, Estimat Glomerular Filtration Rate 5.3, Glucose Level 121H

, Calcium Level 8.0L, Phosphorus Level 3.8, Total Bilirubin 0.8, Aspartate 

Amino Transf (AST/SGOT) 34, Alanine Aminotransferase (ALT/SGPT) 42, Alkaline 

Phosphatase 82, Lactate Dehydrogenase 277H, C-Reactive Protein, Quantitative 

39.3H, Pro-B-Type Natriuretic Peptide 48116D, Total Protein 7.1, Albumin 2.8L, 

Globulin 4.3, Albumin/Globulin Ratio 0.7L


Height (Feet):  5


Height (Inches):  5.00


Weight (Pounds):  156











Van Silva MD Apr 19, 2020 18:40 15.623

## 2020-04-19 NOTE — NEPHROLOGY PROGRESS NOTE
Assessment/Plan


Problem List:  


(1) ESRD (end stage renal disease)


(2) Suspected COVID-19 virus infection


(3) Hyperkalemia


(4) COVID-19 virus infection


Assessment





End-stage renal disease on hemodialysis


Has arm fistula for dialysis-Next dialysis Wednesday, April 15


Dyspnea and shortness of breath, history of COPD, oxygen dependent at home


Exposure to COVID 19


Diabetes mellitus


Hypertension


Remote history of seizure


Anemia of kidney disease


Plan





COVID 19 test detected





Hemodialysis  April 17, next April 20


Keep the blood pressure and blood sugar in check


2D echocardiogram pending


Antibiotics and pulmonary support per consultants





Chest x-ray:


Findings: There is a left chest  HERO catheter, tip projected at the level of 

the


superior vena cava. Some atelectatic bands are seen in the left midlung. The 

lungs


and pleural spaces are otherwise clear. The heart size is upper limits of 

normal. A


stent is seen in the left axilla


Impression: Left midlung atelectasis. No acute process otherwise





Subjective


ROS Limited/Unobtainable:  No





Objective


Objective





Last 24 Hour Vital Signs








  Date Time  Temp Pulse Resp B/P (MAP) Pulse Ox O2 Delivery O2 Flow Rate FiO2


 


4/19/20 08:00 98.3 70 19 131/52 (78) 92   


 


4/19/20 08:00  71      


 


4/19/20 04:00 97.9  17 119/53 (75) 90   


 


4/19/20 04:00  68      


 


4/19/20 00:00 98.6 77 19 159/59 (92) 92   


 


4/19/20 00:00  75      


 


4/18/20 21:00      Nasal Cannula 2.0 


 


4/18/20 20:00 98.4  18 141/57 (85) 95   


 


4/18/20 18:00  73      


 


4/18/20 16:00 98.1 69 20 124/63 (83) 99   


 


4/18/20 12:00 98.1 65 20 115/57 (76) 98   


 


4/18/20 12:00  73      

















Intake and Output  


 


 4/18/20 4/19/20





 19:00 07:00


 


Intake Total 320 ml 200 ml


 


Balance 320 ml 200 ml


 


  


 


Intake Oral 320 ml 200 ml


 


# Bowel Movements 1 








Today's labs pending as of now


Height (Feet):  5


Height (Inches):  5.00


Weight (Pounds):  156


General Appearance:  no apparent distress


Objective


no change











Fouladian,Vik MD Apr 19, 2020 09:15

## 2020-04-19 NOTE — INFECTIOUS DISEASES PROG NOTE
Assessment/Plan


Assessment/Plan


IMPRESSION:


1. Fever resolved


2. COVID-19 disease.likely mild disease


3. COPD.


4. End-stage renal disease on hemodialysis.


5. Diabetes mellitus.


6. Hypertension.


7. Anemia.





RECOMMENDATION:  


We will observe off antibiotic.





Subjective


ROS Limited/Unobtainable:  Yes


Constitutional:  Reports: no symptoms


Respiratory:  Reports: no symptoms


Gastrointestinal/Abdominal:  Reports: no symptoms


Allergies:  


Coded Allergies:  


     No Known Allergies (Unverified , 4/16/13)





Objective


Vital Signs





Last 24 Hour Vital Signs








  Date Time  Temp Pulse Resp B/P (MAP) Pulse Ox O2 Delivery O2 Flow Rate FiO2


 


4/19/20 09:51  71  131/52    


 


4/19/20 08:00 98.3 70 19 131/52 (78) 92   


 


4/19/20 08:00  71      


 


4/19/20 04:00 97.9  17 119/53 (75) 90   


 


4/19/20 04:00  68      


 


4/19/20 00:00 98.6 77 19 159/59 (92) 92   


 


4/19/20 00:00  75      


 


4/18/20 21:00      Nasal Cannula 2.0 


 


4/18/20 20:00 98.4  18 141/57 (85) 95   


 


4/18/20 18:00  73      


 


4/18/20 16:00 98.1 69 20 124/63 (83) 99   








Height (Feet):  5


Height (Inches):  5.00


Weight (Pounds):  156


General Appearance:  no acute distress


HEENT:  mucous membranes moist


Respiratory/Chest:  normal breath sounds


Abdomen:  soft, non tender


Extremities:  no edema


Neurologic/Psychiatric:  alert, responsive





Current Medications








 Medications


  (Trade)  Dose


 Ordered  Sig/Eliazar


 Route


 PRN Reason  Start Time


 Stop Time Status Last Admin


Dose Admin


 


 Acetaminophen


  (Tylenol)  500 mg  Q6H  PRN


 ORAL


 Temp >100.5  4/15/20 01:00


 5/15/20 00:59  4/18/20 23:22


 


 


 Cinacalcet


  (Sensipar)  30 mg  DAILY


 ORAL


   4/15/20 09:00


 7/14/20 08:59  4/19/20 09:50


 


 


 Dextrose


  (Dextrose 50%)  25 ml  Q30M  PRN


 IV


 Hypoglycemia  4/15/20 01:15


 7/14/20 01:14   


 


 


 Dextrose


  (Dextrose 50%)  50 ml  Q30M  PRN


 IV


 Hypoglycemia  4/15/20 01:15


 7/14/20 01:14   


 


 


 Docusate Sodium


  (Colace)  100 mg  TWICE A  DAY


 ORAL


   4/15/20 09:00


 5/15/20 08:59  4/19/20 09:50


 


 


 Hydralazine HCl


  (Apresoline)  25 mg  Q4H  PRN


 ORAL


 bp over 160 syst  4/14/20 19:45


 7/13/20 19:44   


 


 


 Insulin Aspart


  (NovoLOG)    BEFORE MEALS AND  HS


 SUBQ


   4/15/20 06:30


 7/14/20 06:29   


 


 


 Labetalol HCl


  (Normodyne)  100 mg  DAILY


 ORAL


   4/15/20 09:00


 5/15/20 08:59  4/19/20 09:51


 


 


 Pantoprazole


  (Protonix)  40 mg  Q12HR


 ORAL


   4/14/20 21:00


 5/14/20 20:59  4/19/20 09:50


 


 


 Sevelamer


 Carbonate


  (Renvela)  800 mg  THREE TIMES A  DAY


 ORAL


   4/15/20 09:00


 7/14/20 08:59  4/19/20 09:50


 


 


 Vitamin B Complex/


 Vit C/Folic Acid


  (Nephrovite)  1 tab  DAILY


 ORAL


   4/15/20 09:00


 5/15/20 08:59  4/19/20 09:50


 

















Jake Pfeiffer MD Apr 19, 2020 12:45

## 2020-04-19 NOTE — NUR
HAND-OFF: 

Report given to Winsome Thompson RN.  Patient in supine position, on room air, nasal cannula 
available at bedside, no c/o pain, urinal at bedside, side rails up x 3, bed in lowest 
position, call light within reach, in no apparent distress.

## 2020-04-19 NOTE — PULMONOLOGY PROGRESS NOTE
Assessment/Plan


Assessment/Plan


IMPRESSION:


1. History of COVID-19 exposure. Is + COVID 19 here


2. ESRD, on dialysis.


3. Diabetes mellitus.


4. Dyspnea.





DISCUSSION:  





Consider use of Plaquenil or azithromycin only if patient becomes hypoxic.


Currently SaO2 92-95% on RA alternating with 2L/min O2


Currently, he is doing well.  I would avoid unnecessary antibiotics.


Agree with dialysis.  I will follow carefully.














  ______________________________________________


  Oh Solomon M.D.





Subjective


Interval Events:  COVID 19 +


Constitutional:  Reports: no symptoms


HEENT:  Repors: no symptoms


Respiratory:  Reports: no symptoms


Cardiovascular:  Reports: no symptoms


Allergies:  


Coded Allergies:  


     No Known Allergies (Unverified , 4/16/13)





Objective





Last 24 Hour Vital Signs








  Date Time  Temp Pulse Resp B/P (MAP) Pulse Ox O2 Delivery O2 Flow Rate FiO2


 


4/19/20 09:51  71  131/52    


 


4/19/20 08:00 98.3 70 19 131/52 (78) 92   


 


4/19/20 08:00  71      


 


4/19/20 04:00 97.9  17 119/53 (75) 90   


 


4/19/20 04:00  68      


 


4/19/20 00:00 98.6 77 19 159/59 (92) 92   


 


4/19/20 00:00  75      


 


4/18/20 21:00      Nasal Cannula 2.0 


 


4/18/20 20:00 98.4  18 141/57 (85) 95   


 


4/18/20 18:00  73      


 


4/18/20 16:00 98.1 69 20 124/63 (83) 99   


 


4/18/20 12:00 98.1 65 20 115/57 (76) 98   


 


4/18/20 12:00  73      

















Intake and Output  


 


 4/18/20 4/19/20





 19:00 07:00


 


Intake Total 320 ml 200 ml


 


Balance 320 ml 200 ml


 


  


 


Intake Oral 320 ml 200 ml


 


# Bowel Movements 1 








General Appearance:  no acute distress


HEENT:  normocephalic


Respiratory/Chest:  chest wall non-tender, lungs clear


Cardiovascular:  normal peripheral pulses


Abdomen:  normal bowel sounds





Current Medications








 Medications


  (Trade)  Dose


 Ordered  Sig/Eliazar


 Route


 PRN Reason  Start Time


 Stop Time Status Last Admin


Dose Admin


 


 Acetaminophen


  (Tylenol)  500 mg  Q6H  PRN


 ORAL


 Temp >100.5  4/15/20 01:00


 5/15/20 00:59  4/18/20 23:22


 


 


 Cinacalcet


  (Sensipar)  30 mg  DAILY


 ORAL


   4/15/20 09:00


 7/14/20 08:59  4/19/20 09:50


 


 


 Dextrose


  (Dextrose 50%)  25 ml  Q30M  PRN


 IV


 Hypoglycemia  4/15/20 01:15


 7/14/20 01:14   


 


 


 Dextrose


  (Dextrose 50%)  50 ml  Q30M  PRN


 IV


 Hypoglycemia  4/15/20 01:15


 7/14/20 01:14   


 


 


 Docusate Sodium


  (Colace)  100 mg  TWICE A  DAY


 ORAL


   4/15/20 09:00


 5/15/20 08:59  4/19/20 09:50


 


 


 Hydralazine HCl


  (Apresoline)  25 mg  Q4H  PRN


 ORAL


 bp over 160 syst  4/14/20 19:45


 7/13/20 19:44   


 


 


 Insulin Aspart


  (NovoLOG)    BEFORE MEALS AND  HS


 SUBQ


   4/15/20 06:30


 7/14/20 06:29   


 


 


 Labetalol HCl


  (Normodyne)  100 mg  DAILY


 ORAL


   4/15/20 09:00


 5/15/20 08:59  4/19/20 09:51


 


 


 Pantoprazole


  (Protonix)  40 mg  Q12HR


 ORAL


   4/14/20 21:00


 5/14/20 20:59  4/19/20 09:50


 


 


 Sevelamer


 Carbonate


  (Renvela)  800 mg  THREE TIMES A  DAY


 ORAL


   4/15/20 09:00


 7/14/20 08:59  4/19/20 09:50


 


 


 Vitamin B Complex/


 Vit C/Folic Acid


  (Nephrovite)  1 tab  DAILY


 ORAL


   4/15/20 09:00


 5/15/20 08:59  4/19/20 09:50


 

















Oh Solomon MD Apr 19, 2020 10:20

## 2020-04-19 NOTE — HEMATOLOGY/ONC PROGRESS NOTE
Assessment/Plan


Assessment/Plan


Asses/Recs


# Elevated D-dimer on admission, with sob, in this case r/o dvt of lower ext


--> less likely pe, first r/o dvt given more likely viral cause possible


--> obtain duplex of lower ext


--> repeat in future in 3mo


# Anemia of chronic disease due to underlying chronic medical issues, 

multifactorial v Gi bleed 


--> Anemia workup has been ordered, rule out gi bleed 


--> No evidence of hemolysis is noted, peripheral smear has been reviewed.


--> Hgb goal >7. Transfuse prn.


--> Epogen or iron at this time is not particularly indicated


--> Medications have been reviewed


--> hgb trend: 9.8 


# Dyspnea


--> appears likely covid related


# ESRD (end stage renal disease)


--> hd as per Dr. Morrison


# Seizures.


# Hypertension.


# Diabetes mellitus.





The timing of this note does not necessarily reflect the time of the patient 

was seen.





Greatly appreciate consultation.





Subjective


Allergies:  


Coded Allergies:  


     No Known Allergies (Unverified , 4/16/13)


Subjective


4/16 no bleeding , labs noted, hd as needed, renal aware


4/17 on tele, no acute events, nc 2l, sodium 135, hd for today 


4/19 eating breakfast, no overnight events, meds reviewed





Objective


Objective





Current Medications








 Medications


  (Trade)  Dose


 Ordered  Sig/Eliazar


 Route


 PRN Reason  Start Time


 Stop Time Status Last Admin


Dose Admin


 


 Acetaminophen


  (Tylenol)  500 mg  Q6H  PRN


 ORAL


 Temp >100.5  4/15/20 01:00


 5/15/20 00:59  4/18/20 23:22


 


 


 Cinacalcet


  (Sensipar)  30 mg  DAILY


 ORAL


   4/15/20 09:00


 7/14/20 08:59  4/18/20 08:10


 


 


 Dextrose


  (Dextrose 50%)  25 ml  Q30M  PRN


 IV


 Hypoglycemia  4/15/20 01:15


 7/14/20 01:14   


 


 


 Dextrose


  (Dextrose 50%)  50 ml  Q30M  PRN


 IV


 Hypoglycemia  4/15/20 01:15


 7/14/20 01:14   


 


 


 Docusate Sodium


  (Colace)  100 mg  TWICE A  DAY


 ORAL


   4/15/20 09:00


 5/15/20 08:59  4/18/20 17:15


 


 


 Hydralazine HCl


  (Apresoline)  25 mg  Q4H  PRN


 ORAL


 bp over 160 syst  4/14/20 19:45


 7/13/20 19:44   


 


 


 Insulin Aspart


  (NovoLOG)    BEFORE MEALS AND  HS


 SUBQ


   4/15/20 06:30


 7/14/20 06:29   


 


 


 Labetalol HCl


  (Normodyne)  100 mg  DAILY


 ORAL


   4/15/20 09:00


 5/15/20 08:59  4/16/20 09:35


 


 


 Pantoprazole


  (Protonix)  40 mg  Q12HR


 ORAL


   4/14/20 21:00


 5/14/20 20:59  4/18/20 22:54


 


 


 Sevelamer


 Carbonate


  (Renvela)  800 mg  THREE TIMES A  DAY


 ORAL


   4/15/20 09:00


 7/14/20 08:59  4/18/20 17:15


 


 


 Vitamin B Complex/


 Vit C/Folic Acid


  (Nephrovite)  1 tab  DAILY


 ORAL


   4/15/20 09:00


 5/15/20 08:59  4/18/20 08:10


 











Last 24 Hour Vital Signs








  Date Time  Temp Pulse Resp B/P (MAP) Pulse Ox O2 Delivery O2 Flow Rate FiO2


 


4/19/20 08:00 98.3 70 19 131/52 (78) 92   


 


4/19/20 08:00  71      


 


4/19/20 04:00 97.9  17 119/53 (75) 90   


 


4/19/20 04:00  68      


 


4/19/20 00:00 98.6 77 19 159/59 (92) 92   


 


4/19/20 00:00  75      


 


4/18/20 21:00      Nasal Cannula 2.0 


 


4/18/20 20:00 98.4  18 141/57 (85) 95   


 


4/18/20 18:00  73      


 


4/18/20 16:00 98.1 69 20 124/63 (83) 99   


 


4/18/20 12:00 98.1 65 20 115/57 (76) 98   


 


4/18/20 12:00  73      


 


4/18/20 08:18      Nasal Cannula 2.0 


 


4/18/20 08:11  58  110/54    


 


4/18/20 08:00  81      


 


4/18/20 07:50 98.0 58 20 110/54 (72) 98   


 


4/18/20 04:00  73      


 


4/18/20 04:00      Nasal Cannula 2.0 


 


4/18/20 04:00 99.6 76 16 119/48 (71) 99   


 


4/18/20 00:00 98.0 84 22 135/55 (81) 99   


 


4/18/20 00:00      Nasal Cannula 2.0 


 


4/18/20 00:00  78      


 


4/17/20 21:00      Nasal Cannula 2.0 


 


4/17/20 20:00  105      


 


4/17/20 20:00 99.5 97 18 132/53 (79) 96   


 


4/17/20 16:00 98.0 99 20 117/48 (71) 97   


 


4/17/20 12:00  58      


 


4/17/20 12:00 98.2 58 18 135/54 (81) 96   

















Intake and Output  


 


 4/18/20 4/19/20





 19:00 07:00


 


Intake Total 320 ml 200 ml


 


Balance 320 ml 200 ml


 


  


 


Intake Oral 320 ml 200 ml


 


# Bowel Movements 1 











Labs








Test


  4/17/20


06:46


 


White Blood Count


  4.9 K/UL


(4.8-10.8)


 


Red Blood Count


  3.27 M/UL


(4.70-6.10)


 


Hemoglobin


  9.8 G/DL


(14.2-18.0)


 


Hematocrit


  28.4 %


(42.0-52.0)


 


Mean Corpuscular Volume 87 FL (80-99) 


 


Mean Corpuscular Hemoglobin


  30.0 PG


(27.0-31.0)


 


Mean Corpuscular Hemoglobin


Concent 34.5 G/DL


(32.0-36.0)


 


Red Cell Distribution Width


  13.1 %


(11.6-14.8)


 


Platelet Count


  113 K/UL


(150-450)


 


Mean Platelet Volume


  7.5 FL


(6.5-10.1)


 


Neutrophils (%) (Auto)  % (45.0-75.0) 


 


Lymphocytes (%) (Auto)  % (20.0-45.0) 


 


Monocytes (%) (Auto)  % (1.0-10.0) 


 


Eosinophils (%) (Auto)  % (0.0-3.0) 


 


Basophils (%) (Auto)  % (0.0-2.0) 


 


Differential Total Cells


Counted 100 


 


 


Neutrophils % (Manual) 73 % (45-75) 


 


Lymphocytes % (Manual) 18 % (20-45) 


 


Monocytes % (Manual) 9 % (1-10) 


 


Eosinophils % (Manual) 0 % (0-3) 


 


Basophils % (Manual) 0 % (0-2) 


 


Band Neutrophils 0 % (0-8) 


 


Platelet Estimate Decreased 


 


Platelet Morphology Normal 


 


Red Blood Cell Morphology Normal 


 


Sodium Level


  135 MMOL/L


(136-145)


 


Potassium Level


  4.7 MMOL/L


(3.5-5.1)


 


Chloride Level


  96 MMOL/L


()


 


Carbon Dioxide Level


  31 MMOL/L


(21-32)


 


Anion Gap


  8 mmol/L


(5-15)


 


Blood Urea Nitrogen


  44 mg/dL


(7-18)


 


Creatinine


  8.8 MG/DL


(0.55-1.30)


 


Estimat Glomerular Filtration


Rate 5.8 mL/min


(>60)


 


Glucose Level


  90 MG/DL


()


 


Calcium Level


  8.3 MG/DL


(8.5-10.1)


 


Phosphorus Level


  3.8 MG/DL


(2.5-4.9)


 


Magnesium Level


  2.0 MG/DL


(1.8-2.4)


 


Total Bilirubin


  0.6 MG/DL


(0.2-1.0)


 


Aspartate Amino Transf


(AST/SGOT) 43 U/L (15-37) 


 


 


Alanine Aminotransferase


(ALT/SGPT) 34 U/L (12-78) 


 


 


Alkaline Phosphatase


  71 U/L


()


 


C-Reactive Protein,


Quantitative 21.4 mg/dL


(0.00-0.90)


 


Pro-B-Type Natriuretic Peptide


  89492 pg/mL


(0-125)


 


Total Protein


  6.2 G/DL


(6.4-8.2)


 


Albumin


  2.6 G/DL


(3.4-5.0)


 


Globulin 3.6 g/dL 


 


Albumin/Globulin Ratio 0.7 (1.0-2.7) 








Height (Feet):  5


Height (Inches):  5.00


Weight (Pounds):  156


Objective





Physical Exam


Vitals: reviewed


General: NAD


HEENT: nc, at


Neck: supple


Chest: clear breath sounds bilaterally


Cardiovascular: RRR, no s3, s4


Neuro: alert and oriented











Mt Staley MD Apr 19, 2020 09:49

## 2020-04-19 NOTE — NUR
NURSE NOTES:

Received report from Abi Thompson, Registry Nurse, Patient in semi-Kwon's psoition, awake 
and alert, bed in lowest position, call light within reach, on room air; patient is eating 
breakfast and watching television.

## 2020-04-20 VITALS — SYSTOLIC BLOOD PRESSURE: 124 MMHG | DIASTOLIC BLOOD PRESSURE: 71 MMHG

## 2020-04-20 VITALS — SYSTOLIC BLOOD PRESSURE: 128 MMHG | DIASTOLIC BLOOD PRESSURE: 66 MMHG

## 2020-04-20 VITALS — DIASTOLIC BLOOD PRESSURE: 69 MMHG | SYSTOLIC BLOOD PRESSURE: 126 MMHG

## 2020-04-20 VITALS — SYSTOLIC BLOOD PRESSURE: 127 MMHG | DIASTOLIC BLOOD PRESSURE: 48 MMHG

## 2020-04-20 RX ADMIN — INSULIN ASPART SCH UNITS: 100 INJECTION, SOLUTION INTRAVENOUS; SUBCUTANEOUS at 06:30

## 2020-04-20 RX ADMIN — INSULIN ASPART SCH UNITS: 100 INJECTION, SOLUTION INTRAVENOUS; SUBCUTANEOUS at 11:30

## 2020-04-20 RX ADMIN — INSULIN ASPART SCH UNITS: 100 INJECTION, SOLUTION INTRAVENOUS; SUBCUTANEOUS at 16:30

## 2020-04-20 RX ADMIN — SEVELAMER CARBONATE SCH MG: 800 POWDER, FOR SUSPENSION ORAL at 08:45

## 2020-04-20 RX ADMIN — INSULIN ASPART SCH UNITS: 100 INJECTION, SOLUTION INTRAVENOUS; SUBCUTANEOUS at 21:00

## 2020-04-20 RX ADMIN — Medication SCH TAB: at 08:45

## 2020-04-20 RX ADMIN — DOCUSATE SODIUM SCH MG: 100 CAPSULE, LIQUID FILLED ORAL at 17:32

## 2020-04-20 RX ADMIN — SEVELAMER CARBONATE SCH MG: 800 POWDER, FOR SUSPENSION ORAL at 14:11

## 2020-04-20 RX ADMIN — CINACALCET HYDROCHLORIDE SCH MG: 30 TABLET, COATED ORAL at 08:45

## 2020-04-20 RX ADMIN — SEVELAMER CARBONATE SCH MG: 800 POWDER, FOR SUSPENSION ORAL at 17:32

## 2020-04-20 RX ADMIN — DOCUSATE SODIUM SCH MG: 100 CAPSULE, LIQUID FILLED ORAL at 08:46

## 2020-04-20 NOTE — PULMONOLOGY PROGRESS NOTE
Assessment/Plan


Assessment/Plan


IMPRESSION:


1. History of COVID-19 exposure. Is + COVID 19 here


2. ESRD, on dialysis.


3. Diabetes mellitus.


4. Dyspnea.





DISCUSSION:  





Consider use of Plaquenil or azithromycin only if patient becomes hypoxic.


Currently SaO2 92-95% on RA alternating with 2L/min O2


Currently, he is doing well.  I would avoid unnecessary antibiotics.


Agree with dialysis.  I will follow carefully.














  ______________________________________________


  Oh Solomon M.D.





Subjective


Interval Events:  None new


Constitutional:  Reports: no symptoms


HEENT:  Repors: no symptoms


Respiratory:  Reports: no symptoms


Cardiovascular:  Reports: no symptoms


Allergies:  


Coded Allergies:  


     No Known Allergies (Unverified , 4/16/13)





Objective





Last 24 Hour Vital Signs








  Date Time  Temp Pulse Resp B/P (MAP) Pulse Ox O2 Delivery O2 Flow Rate FiO2


 


4/20/20 09:00  73  124/71    


 


4/20/20 08:00 98.4 73 21 124/71 (88) 93   


 


4/20/20 04:00  65      


 


4/20/20 00:00  66      


 


4/19/20 21:00      Nasal Cannula 2.0 


 


4/19/20 20:00  64      


 


4/19/20 16:00  62      


 


4/19/20 12:00  75      


 


4/19/20 12:00 97.9 67 18 111/55 (73) 91   

















Intake and Output  


 


 4/19/20 4/20/20





 19:00 07:00


 


Intake Total 700 ml 120 ml


 


Balance 700 ml 120 ml


 


  


 


Intake Oral 700 ml 120 ml


 


# Voids 4 


 


# Bowel Movements 34 33








General Appearance:  no acute distress


HEENT:  normocephalic


Respiratory/Chest:  chest wall non-tender, lungs clear


Cardiovascular:  normal peripheral pulses, normal rate


Abdomen:  normal bowel sounds


Laboratory Tests


4/19/20 12:05: 


White Blood Count 8.1, Red Blood Count 3.58L, Hemoglobin 10.2L, Hematocrit 31.3L

, Mean Corpuscular Volume 87, Mean Corpuscular Hemoglobin 28.4, Mean 

Corpuscular Hemoglobin Concent 32.5, Red Cell Distribution Width 13.0, Platelet 

Count 121L, Mean Platelet Volume 7.8, Neutrophils (%) (Auto) 76.6H, Lymphocytes 

(%) (Auto) 13.9L, Monocytes (%) (Auto) 7.8, Eosinophils (%) (Auto) 1.1, 

Basophils (%) (Auto) 0.5, D-Dimer 1.70H, Sodium Level 137, Potassium Level 4.3, 

Chloride Level 96L, Carbon Dioxide Level 27, Anion Gap 14, Blood Urea Nitrogen 

50H, Creatinine 9.5H, Estimat Glomerular Filtration Rate 5.3, Glucose Level 121H

, Calcium Level 8.0L, Phosphorus Level 3.8, Total Bilirubin 0.8, Aspartate 

Amino Transf (AST/SGOT) 34, Alanine Aminotransferase (ALT/SGPT) 42, Alkaline 

Phosphatase 82, Lactate Dehydrogenase 277H, C-Reactive Protein, Quantitative 

39.3H, Pro-B-Type Natriuretic Peptide 88151U, Total Protein 7.1, Albumin 2.8L, 

Globulin 4.3, Albumin/Globulin Ratio 0.7L





Current Medications








 Medications


  (Trade)  Dose


 Ordered  Sig/Eliazar


 Route


 PRN Reason  Start Time


 Stop Time Status Last Admin


Dose Admin


 


 Acetaminophen


  (Tylenol)  500 mg  Q6H  PRN


 ORAL


 Temp >100.5  4/15/20 01:00


 5/15/20 00:59  4/18/20 23:22


 


 


 Cinacalcet


  (Sensipar)  30 mg  DAILY


 ORAL


   4/15/20 09:00


 7/14/20 08:59  4/20/20 08:45


 


 


 Dextrose


  (Dextrose 50%)  25 ml  Q30M  PRN


 IV


 Hypoglycemia  4/15/20 01:15


 7/14/20 01:14   


 


 


 Dextrose


  (Dextrose 50%)  50 ml  Q30M  PRN


 IV


 Hypoglycemia  4/15/20 01:15


 7/14/20 01:14   


 


 


 Docusate Sodium


  (Colace)  100 mg  TWICE A  DAY


 ORAL


   4/15/20 09:00


 5/15/20 08:59  4/20/20 08:46


 


 


 Hydralazine HCl


  (Apresoline)  25 mg  Q4H  PRN


 ORAL


 bp over 160 syst  4/14/20 19:45


 7/13/20 19:44   


 


 


 Insulin Aspart


  (NovoLOG)    BEFORE MEALS AND  HS


 SUBQ


   4/15/20 06:30


 7/14/20 06:29   


 


 


 Labetalol HCl


  (Normodyne)  100 mg  DAILY


 ORAL


   4/15/20 09:00


 5/15/20 08:59  4/19/20 09:51


 


 


 Pantoprazole


  (Protonix)  40 mg  Q12HR


 ORAL


   4/14/20 21:00


 5/14/20 20:59  4/20/20 08:45


 


 


 Sevelamer


 Carbonate


  (Renvela)  800 mg  THREE TIMES A  DAY


 ORAL


   4/15/20 09:00


 7/14/20 08:59  4/20/20 08:45


 


 


 Vitamin B Complex/


 Vit C/Folic Acid


  (Nephrovite)  1 tab  DAILY


 ORAL


   4/15/20 09:00


 5/15/20 08:59  4/20/20 08:45


 

















Oh Solomon MD Apr 20, 2020 10:24

## 2020-04-20 NOTE — GENERAL PROGRESS NOTE
Assessment/Plan


Problem List:  


(1) Hyperkalemia


ICD Codes:  E87.5 - Hyperkalemia


SNOMED:  06852472


(2) ESRD (end stage renal disease)


ICD Codes:  N18.6 - End stage renal disease


SNOMED:  99155295


(3) Suspected COVID-19 virus infection


ICD Codes:  R68.89 - Other general symptoms and signs


SNOMED:  456885647


Status:  progressing


Assessment/Plan:


covid positive


esrd on hd


pna


respiratory insuff


repeat covid test


supportive therapy


no wheezing





Subjective


ROS Limited/Unobtainable:  Yes


Allergies:  


Coded Allergies:  


     No Known Allergies (Unverified , 4/16/13)





Objective





Last 24 Hour Vital Signs








  Date Time  Temp Pulse Resp B/P (MAP) Pulse Ox O2 Delivery O2 Flow Rate FiO2


 


4/20/20 16:00  82      


 


4/20/20 16:00 98.3 68 21 128/66 (86) 93   


 


4/20/20 12:00  70      


 


4/20/20 12:00 98.1 66 21 127/48 (74) 98   


 


4/20/20 09:00  73  124/71    


 


4/20/20 09:00      Nasal Cannula 2.0 


 


4/20/20 08:00  71      


 


4/20/20 08:00 98.4 73 21 124/71 (88) 93   


 


4/20/20 04:00  65      


 


4/20/20 00:00  66      

















Intake and Output  


 


 4/19/20 4/20/20





 19:00 07:00


 


Intake Total 700 ml 120 ml


 


Balance 700 ml 120 ml


 


  


 


Intake Oral 700 ml 120 ml


 


# Voids 4 


 


# Bowel Movements 34 33








Height (Feet):  5


Height (Inches):  5.00


Weight (Pounds):  156











Van Silva MD Apr 20, 2020 21:53

## 2020-04-20 NOTE — NUR
CASE MANAGEMENT:REVIEW



4/20/20

SI: COVID 19 PNEUMONIA. ESRD/HD

98.4   73  21  124/71  93% ON 2L/NC



IS: RENVELA PO TID

SENSIPAR PO QD

LABETALOL PO QD

SS INSULIN AC+HS

PROTONIX PO Q12

NEPHROVITE PO QD

SS INSULIN AC+HS

PROTONIX PO Q12

**: TELEMETRY STATUS

DCP: FROM HOME WITH OUTPT DIALYSIS

## 2020-04-20 NOTE — INFECTIOUS DISEASES PROG NOTE
Assessment/Plan


Assessment/Plan


IMPRESSION:


1. Fever resolved


2. COVID-19 disease.likely mild disease


3. COPD.


4. End-stage renal disease on hemodialysis.


5. Diabetes mellitus.


6. Hypertension.


7. Anemia.





RECOMMENDATION:  


We will observe off antibiotic.





Subjective


ROS Limited/Unobtainable:  Yes


Constitutional:  Reports: no symptoms


Respiratory:  Reports: productive cough, other - very little


Gastrointestinal/Abdominal:  Reports: no symptoms


Genitourinary:  Reports: no symptoms


Allergies:  


Coded Allergies:  


     No Known Allergies (Unverified , 4/16/13)





Objective


Vital Signs





Last 24 Hour Vital Signs








  Date Time  Temp Pulse Resp B/P (MAP) Pulse Ox O2 Delivery O2 Flow Rate FiO2


 


4/20/20 09:00  73  124/71    


 


4/20/20 09:00      Nasal Cannula 2.0 


 


4/20/20 08:00  71      


 


4/20/20 08:00 98.4 73 21 124/71 (88) 93   


 


4/20/20 04:00  65      


 


4/20/20 00:00  66      


 


4/19/20 21:00      Nasal Cannula 2.0 


 


4/19/20 20:00  64      


 


4/19/20 16:00  62      








Height (Feet):  5


Height (Inches):  5.00


Weight (Pounds):  156


General Appearance:  no acute distress


HEENT:  mucous membranes moist


Respiratory/Chest:  lungs clear


Cardiovascular:  normal rate


Abdomen:  soft, non tender


Extremities:  no edema


Neurologic/Psychiatric:  alert, responsive





Current Medications








 Medications


  (Trade)  Dose


 Ordered  Sig/Eliazar


 Route


 PRN Reason  Start Time


 Stop Time Status Last Admin


Dose Admin


 


 Acetaminophen


  (Tylenol)  500 mg  Q6H  PRN


 ORAL


 Temp >100.5  4/15/20 01:00


 5/15/20 00:59  4/18/20 23:22


 


 


 Cinacalcet


  (Sensipar)  30 mg  DAILY


 ORAL


   4/15/20 09:00


 7/14/20 08:59  4/20/20 08:45


 


 


 Dextrose


  (Dextrose 50%)  25 ml  Q30M  PRN


 IV


 Hypoglycemia  4/15/20 01:15


 7/14/20 01:14   


 


 


 Dextrose


  (Dextrose 50%)  50 ml  Q30M  PRN


 IV


 Hypoglycemia  4/15/20 01:15


 7/14/20 01:14   


 


 


 Docusate Sodium


  (Colace)  100 mg  TWICE A  DAY


 ORAL


   4/15/20 09:00


 5/15/20 08:59  4/20/20 08:46


 


 


 Hydralazine HCl


  (Apresoline)  25 mg  Q4H  PRN


 ORAL


 bp over 160 syst  4/14/20 19:45


 7/13/20 19:44   


 


 


 Insulin Aspart


  (NovoLOG)    BEFORE MEALS AND  HS


 SUBQ


   4/15/20 06:30


 7/14/20 06:29   


 


 


 Labetalol HCl


  (Normodyne)  100 mg  DAILY


 ORAL


   4/15/20 09:00


 5/15/20 08:59  4/19/20 09:51


 


 


 Pantoprazole


  (Protonix)  40 mg  Q12HR


 ORAL


   4/14/20 21:00


 5/14/20 20:59  4/20/20 08:45


 


 


 Sevelamer


 Carbonate


  (Renvela)  800 mg  THREE TIMES A  DAY


 ORAL


   4/15/20 09:00


 7/14/20 08:59  4/20/20 08:45


 


 


 Vitamin B Complex/


 Vit C/Folic Acid


  (Nephrovite)  1 tab  DAILY


 ORAL


   4/15/20 09:00


 5/15/20 08:59  4/20/20 08:45


 

















Jake Pfeiffer MD Apr 20, 2020 12:14

## 2020-04-20 NOTE — HEMATOLOGY/ONC PROGRESS NOTE
Assessment/Plan


Assessment/Plan


Asses/Recs


# Elevated D-dimer on admission, with sob, in this case r/o dvt of lower ext


--> less likely pe, first r/o dvt given more likely viral cause possible


--> obtain duplex of lower ext


--> repeat in future in 3mo


# Anemia of chronic disease due to underlying chronic medical issues, 

multifactorial v Gi bleed 


--> Anemia workup has been ordered, rule out gi bleed 


--> No evidence of hemolysis is noted, peripheral smear has been reviewed.


--> Hgb goal >7. Transfuse prn.


--> Epogen or iron at this time is not particularly indicated


--> Medications have been reviewed


--> hgb trend: 9.8 -->10


# Dyspnea


--> appears likely covid related


--> pulm aware


# ESRD (end stage renal disease)


--> hd as per Dr. Morrison


# Seizures.


# Hypertension.


# Diabetes mellitus.





The timing of this note does not necessarily reflect the time of the patient 

was seen.





Greatly appreciate consultation.





Subjective


Constitutional:  Denies: no symptoms, chills, fever, malaise, weakness, other


HEENT:  Denies: no symptoms, eye pain, blurred vision, tearing, double vision, 

ear pain, ear discharge, nose pain, nose congestion, throat pain, throat 

swelling, mouth pain, mouth swelling, other


Cardiovascular:  Denies: no symptoms, chest pain, edema, irregular heart rate, 

lightheadedness, palpitations, syncope, other


Respiratory:  Denies: no symptoms, cough, shortness of breath, SOB with 

excertion, SOB at rest, sputum, wheezing, other


Gastrointestinal/Abdominal:  Denies: no symptoms, abdomen distended, abdominal 

pain, black stools, tarry stools, blood in stool, constipated, diarrhea, 

difficulty swallowing, nausea, poor appetite, poor fluid intake, rectal bleeding

, vomiting, other


Neurologic/Psychiatric:  Denies: no symptoms, anxiety, depressed, emotional 

problems, headache, numbness, paresthesia, pre-existing deficit, seizure, 

tingling, tremors, weakness, other


Endocrine:  Denies: no symptoms, excessive sweating, flushing, intolerance to 

cold, intolerance to heat, increased hunger, increased thirst, increased urine, 

unexplained weight gain, unexplained weight loss, other


Hematologic/Lymphatic:  Denies: no symptoms, anemia, easy bleeding, easy 

bruising, adenopathy, other


Allergies:  


Coded Allergies:  


     No Known Allergies (Unverified , 4/16/13)


Subjective


4/16 no bleeding , labs noted, hd as needed, renal aware


4/17 on tele, no acute events, nc 2l, sodium 135, hd for today 


4/19 eating breakfast, no overnight events, meds reviewed 


4/20 no events, no bleeding, no fc, no night sweats, hgb 10, hd today





Objective


Objective





Current Medications








 Medications


  (Trade)  Dose


 Ordered  Sig/Eliazar


 Route


 PRN Reason  Start Time


 Stop Time Status Last Admin


Dose Admin


 


 Acetaminophen


  (Tylenol)  500 mg  Q6H  PRN


 ORAL


 Temp >100.5  4/15/20 01:00


 5/15/20 00:59  4/18/20 23:22


 


 


 Cinacalcet


  (Sensipar)  30 mg  DAILY


 ORAL


   4/15/20 09:00


 7/14/20 08:59  4/20/20 08:45


 


 


 Dextrose


  (Dextrose 50%)  25 ml  Q30M  PRN


 IV


 Hypoglycemia  4/15/20 01:15


 7/14/20 01:14   


 


 


 Dextrose


  (Dextrose 50%)  50 ml  Q30M  PRN


 IV


 Hypoglycemia  4/15/20 01:15


 7/14/20 01:14   


 


 


 Docusate Sodium


  (Colace)  100 mg  TWICE A  DAY


 ORAL


   4/15/20 09:00


 5/15/20 08:59  4/20/20 08:46


 


 


 Hydralazine HCl


  (Apresoline)  25 mg  Q4H  PRN


 ORAL


 bp over 160 syst  4/14/20 19:45


 7/13/20 19:44   


 


 


 Insulin Aspart


  (NovoLOG)    BEFORE MEALS AND  HS


 SUBQ


   4/15/20 06:30


 7/14/20 06:29   


 


 


 Labetalol HCl


  (Normodyne)  100 mg  DAILY


 ORAL


   4/15/20 09:00


 5/15/20 08:59  4/19/20 09:51


 


 


 Pantoprazole


  (Protonix)  40 mg  Q12HR


 ORAL


   4/14/20 21:00


 5/14/20 20:59  4/20/20 08:45


 


 


 Sevelamer


 Carbonate


  (Renvela)  800 mg  THREE TIMES A  DAY


 ORAL


   4/15/20 09:00


 7/14/20 08:59  4/20/20 08:45


 


 


 Vitamin B Complex/


 Vit C/Folic Acid


  (Nephrovite)  1 tab  DAILY


 ORAL


   4/15/20 09:00


 5/15/20 08:59  4/20/20 08:45


 











Last 24 Hour Vital Signs








  Date Time  Temp Pulse Resp B/P (MAP) Pulse Ox O2 Delivery O2 Flow Rate FiO2


 


4/20/20 09:00  73  124/71    


 


4/20/20 09:00      Nasal Cannula 2.0 


 


4/20/20 08:00  71      


 


4/20/20 08:00 98.4 73 21 124/71 (88) 93   


 


4/20/20 04:00  65      


 


4/20/20 00:00  66      


 


4/19/20 21:00      Nasal Cannula 2.0 


 


4/19/20 20:00  64      


 


4/19/20 16:00  62      


 


4/19/20 12:00  75      


 


4/19/20 12:00 97.9 67 18 111/55 (73) 91   


 


4/19/20 09:51  71  131/52    


 


4/19/20 09:00      Nasal Cannula 2.0 


 


4/19/20 08:00 98.3 70 19 131/52 (78) 92   


 


4/19/20 08:00  71      


 


4/19/20 04:00 97.9  17 119/53 (75) 90   


 


4/19/20 04:00  68      


 


4/19/20 00:00 98.6 77 19 159/59 (92) 92   


 


4/19/20 00:00  75      


 


4/18/20 21:00      Nasal Cannula 2.0 


 


4/18/20 20:00 98.4  18 141/57 (85) 95   


 


4/18/20 18:00  73      


 


4/18/20 16:00 98.1 69 20 124/63 (83) 99   

















Intake and Output  


 


 4/19/20 4/20/20





 19:00 07:00


 


Intake Total 700 ml 120 ml


 


Balance 700 ml 120 ml


 


  


 


Intake Oral 700 ml 120 ml


 


# Voids 4 


 


# Bowel Movements 34 33











Labs








Test


  4/19/20


12:05


 


White Blood Count


  8.1 K/UL


(4.8-10.8)


 


Red Blood Count


  3.58 M/UL


(4.70-6.10)


 


Hemoglobin


  10.2 G/DL


(14.2-18.0)


 


Hematocrit


  31.3 %


(42.0-52.0)


 


Mean Corpuscular Volume 87 FL (80-99) 


 


Mean Corpuscular Hemoglobin


  28.4 PG


(27.0-31.0)


 


Mean Corpuscular Hemoglobin


Concent 32.5 G/DL


(32.0-36.0)


 


Red Cell Distribution Width


  13.0 %


(11.6-14.8)


 


Platelet Count


  121 K/UL


(150-450)


 


Mean Platelet Volume


  7.8 FL


(6.5-10.1)


 


Neutrophils (%) (Auto)


  76.6 %


(45.0-75.0)


 


Lymphocytes (%) (Auto)


  13.9 %


(20.0-45.0)


 


Monocytes (%) (Auto)


  7.8 %


(1.0-10.0)


 


Eosinophils (%) (Auto)


  1.1 %


(0.0-3.0)


 


Basophils (%) (Auto)


  0.5 %


(0.0-2.0)


 


D-Dimer


  1.70 mg/L FEU


(0.00-0.49)


 


Sodium Level


  137 MMOL/L


(136-145)


 


Potassium Level


  4.3 MMOL/L


(3.5-5.1)


 


Chloride Level


  96 MMOL/L


()


 


Carbon Dioxide Level


  27 MMOL/L


(21-32)


 


Anion Gap


  14 mmol/L


(5-15)


 


Blood Urea Nitrogen


  50 mg/dL


(7-18)


 


Creatinine


  9.5 MG/DL


(0.55-1.30)


 


Estimat Glomerular Filtration


Rate 5.3 mL/min


(>60)


 


Glucose Level


  121 MG/DL


()


 


Calcium Level


  8.0 MG/DL


(8.5-10.1)


 


Phosphorus Level


  3.8 MG/DL


(2.5-4.9)


 


Total Bilirubin


  0.8 MG/DL


(0.2-1.0)


 


Aspartate Amino Transf


(AST/SGOT) 34 U/L (15-37) 


 


 


Alanine Aminotransferase


(ALT/SGPT) 42 U/L (12-78) 


 


 


Alkaline Phosphatase


  82 U/L


()


 


Lactate Dehydrogenase


  277 U/L


()


 


C-Reactive Protein,


Quantitative 39.3 mg/dL


(0.00-0.90)


 


Pro-B-Type Natriuretic Peptide


  48967 pg/mL


(0-125)


 


Total Protein


  7.1 G/DL


(6.4-8.2)


 


Albumin


  2.8 G/DL


(3.4-5.0)


 


Globulin 4.3 g/dL 


 


Albumin/Globulin Ratio 0.7 (1.0-2.7) 








Height (Feet):  5


Height (Inches):  5.00


Weight (Pounds):  156


Objective





Physical Exam


Vitals: reviewed


General: NAD


HEENT: nc, at


Neck: supple


Chest: clear breath sounds bilaterally


Cardiovascular: RRR, no s3, s4


Neuro: alert and oriented











Mt Staley MD Apr 20, 2020 13:59

## 2020-04-20 NOTE — NUR
***DISCHARGE PLANNING

PATIENT IS FROM HOME AND COVID 19 POSITIVE



SPOKE WITH PATIENTS NEPHEW ~ [facility]  T: [phone]

PATIENT GOES TO OUTPATIENT DIALYSIS

[id number]

 [facility]

[address]

T: [phone]



**CALLED [facility] AND SPOKE WITH KARLA. PER KARLA THEY ARE ABLE TO ACCEPT ANY COVID 
POSTIVE PATIENT BUT AT TIME OF DISCHARGE THEY WILL MAKE ARRANGEMENTS FOR PATIENT TO GO TO 
ONE OF THEIR OTHER FACILITIES



*CONTACT  [facility] UPON DISCHARGE

-------------------------------------------------------------------------------

Addendum: 04/20/20 at 1409 by LANA ESTRADA LVN LVN

-------------------------------------------------------------------------------

FAXED CLINICALS AND COVID RESULTS TO KARLA AT:

 [facility]

T: [phone]

F: [phone]

## 2020-04-20 NOTE — NEPHROLOGY PROGRESS NOTE
Assessment/Plan


Problem List:  


(1) ESRD (end stage renal disease)


(2) Suspected COVID-19 virus infection


(3) Hyperkalemia


(4) COVID-19 virus infection


Assessment





End-stage renal disease on hemodialysis


Has arm fistula for dialysis-Next dialysis Wednesday, April 15


Dyspnea and shortness of breath, history of COPD, oxygen dependent at home


Exposure to COVID 19


Diabetes mellitus


Hypertension


Remote history of seizure


Anemia of kidney disease


Plan





COVID 19 test detected





Hemodialysis  April 17, next April 20


Keep the blood pressure and blood sugar in check


2D echocardiogram pending


Antibiotics and pulmonary support per consultants





Chest x-ray:


Findings: There is a left chest  HERO catheter, tip projected at the level of 

the


superior vena cava. Some atelectatic bands are seen in the left midlung. The 

lungs


and pleural spaces are otherwise clear. The heart size is upper limits of 

normal. A


stent is seen in the left axilla


Impression: Left midlung atelectasis. No acute process otherwise





Subjective


ROS Limited/Unobtainable:  No


Constitutional:  Reports: malaise





Objective


Objective





Last 24 Hour Vital Signs








  Date Time  Temp Pulse Resp B/P (MAP) Pulse Ox O2 Delivery O2 Flow Rate FiO2


 


4/20/20 09:00  73  124/71    


 


4/20/20 09:00      Nasal Cannula 2.0 


 


4/20/20 08:00  71      


 


4/20/20 08:00 98.4 73 21 124/71 (88) 93   


 


4/20/20 04:00  65      


 


4/20/20 00:00  66      


 


4/19/20 21:00      Nasal Cannula 2.0 


 


4/19/20 20:00  64      


 


4/19/20 16:00  62      

















Intake and Output  


 


 4/19/20 4/20/20





 19:00 07:00


 


Intake Total 700 ml 120 ml


 


Balance 700 ml 120 ml


 


  


 


Intake Oral 700 ml 120 ml


 


# Voids 4 


 


# Bowel Movements 34 33








No labs today


Height (Feet):  5


Height (Inches):  5.00


Weight (Pounds):  156


General Appearance:  no apparent distress


Objective


no change











Vik Morrison MD Apr 20, 2020 12:08

## 2020-04-20 NOTE — NUR
NURSE NOTES:

Received report from NI Schumacher. Patient in bed resting, no active s/s cardiac, respiratory 
distress noticed at this time. Patient AOx4, on room air at this time. IV on right hand 20G, 
asymptomatic, patent, intact. Fistula on left arm. Bed in lowest position, side rails upx2, 
call light within reach, bed alarm on. Will continue to monitor.

## 2020-04-20 NOTE — NUR
NURSE NOTES:

Received patient report from NI Sandoval. Patient shows no signs of distress or pain at the 
time. AO x 4. IV checked and its leaking, will change during shift. Bed in the lowest 
position, call light within reach, side rails up x 3, and bed  brakes on. Will continue plan 
of care.

## 2020-04-20 NOTE — NUR
NURSE NOTES:

Called [facility]phrology tele : [phone] spoke with Christine and informed patient schedule for 
HD for today 4/20/20 per Dr. Morrison.

## 2020-04-21 VITALS — DIASTOLIC BLOOD PRESSURE: 51 MMHG | SYSTOLIC BLOOD PRESSURE: 125 MMHG

## 2020-04-21 VITALS — DIASTOLIC BLOOD PRESSURE: 67 MMHG | SYSTOLIC BLOOD PRESSURE: 142 MMHG

## 2020-04-21 VITALS — DIASTOLIC BLOOD PRESSURE: 72 MMHG | SYSTOLIC BLOOD PRESSURE: 121 MMHG

## 2020-04-21 VITALS — DIASTOLIC BLOOD PRESSURE: 48 MMHG | SYSTOLIC BLOOD PRESSURE: 121 MMHG

## 2020-04-21 VITALS — DIASTOLIC BLOOD PRESSURE: 49 MMHG | SYSTOLIC BLOOD PRESSURE: 120 MMHG

## 2020-04-21 VITALS — DIASTOLIC BLOOD PRESSURE: 72 MMHG | SYSTOLIC BLOOD PRESSURE: 99 MMHG

## 2020-04-21 LAB
ADD MANUAL DIFF: NO
ALBUMIN SERPL-MCNC: 2.4 G/DL (ref 3.4–5)
ALBUMIN/GLOB SERPL: 0.5 {RATIO} (ref 1–2.7)
ALP SERPL-CCNC: 96 U/L (ref 46–116)
ALT SERPL-CCNC: 37 U/L (ref 12–78)
ANION GAP SERPL CALC-SCNC: 11 MMOL/L (ref 5–15)
AST SERPL-CCNC: 44 U/L (ref 15–37)
BASOPHILS NFR BLD AUTO: 0.4 % (ref 0–2)
BILIRUB SERPL-MCNC: 0.7 MG/DL (ref 0.2–1)
BUN SERPL-MCNC: 37 MG/DL (ref 7–18)
CALCIUM SERPL-MCNC: 8.3 MG/DL (ref 8.5–10.1)
CHLORIDE SERPL-SCNC: 97 MMOL/L (ref 98–107)
CO2 SERPL-SCNC: 27 MMOL/L (ref 21–32)
CREAT SERPL-MCNC: 8.3 MG/DL (ref 0.55–1.3)
EOSINOPHIL NFR BLD AUTO: 0.4 % (ref 0–3)
ERYTHROCYTE [DISTWIDTH] IN BLOOD BY AUTOMATED COUNT: 13.2 % (ref 11.6–14.8)
GLOBULIN SER-MCNC: 4.5 G/DL
HCT VFR BLD CALC: 32.9 % (ref 42–52)
HGB BLD-MCNC: 10.8 G/DL (ref 14.2–18)
LYMPHOCYTES NFR BLD AUTO: 11.9 % (ref 20–45)
MCV RBC AUTO: 87 FL (ref 80–99)
MONOCYTES NFR BLD AUTO: 9.7 % (ref 1–10)
NEUTROPHILS NFR BLD AUTO: 77.5 % (ref 45–75)
PHOSPHATE SERPL-MCNC: 2.9 MG/DL (ref 2.5–4.9)
PLATELET # BLD: 139 K/UL (ref 150–450)
POTASSIUM SERPL-SCNC: 4.1 MMOL/L (ref 3.5–5.1)
RBC # BLD AUTO: 3.78 M/UL (ref 4.7–6.1)
SODIUM SERPL-SCNC: 135 MMOL/L (ref 136–145)
WBC # BLD AUTO: 9.7 K/UL (ref 4.8–10.8)

## 2020-04-21 RX ADMIN — ACETAMINOPHEN PRN MG: 500 TABLET, FILM COATED ORAL at 10:34

## 2020-04-21 RX ADMIN — INSULIN ASPART SCH UNITS: 100 INJECTION, SOLUTION INTRAVENOUS; SUBCUTANEOUS at 06:30

## 2020-04-21 RX ADMIN — SEVELAMER CARBONATE SCH MG: 800 POWDER, FOR SUSPENSION ORAL at 18:00

## 2020-04-21 RX ADMIN — INSULIN ASPART SCH UNITS: 100 INJECTION, SOLUTION INTRAVENOUS; SUBCUTANEOUS at 16:30

## 2020-04-21 RX ADMIN — DOCUSATE SODIUM SCH MG: 100 CAPSULE, LIQUID FILLED ORAL at 10:34

## 2020-04-21 RX ADMIN — INSULIN ASPART SCH UNITS: 100 INJECTION, SOLUTION INTRAVENOUS; SUBCUTANEOUS at 11:30

## 2020-04-21 RX ADMIN — INSULIN ASPART SCH UNITS: 100 INJECTION, SOLUTION INTRAVENOUS; SUBCUTANEOUS at 21:58

## 2020-04-21 RX ADMIN — ENOXAPARIN SODIUM SCH MG: 30 INJECTION SUBCUTANEOUS at 09:00

## 2020-04-21 RX ADMIN — CINACALCET HYDROCHLORIDE SCH MG: 30 TABLET, COATED ORAL at 10:34

## 2020-04-21 RX ADMIN — SEVELAMER CARBONATE SCH MG: 800 POWDER, FOR SUSPENSION ORAL at 10:35

## 2020-04-21 RX ADMIN — Medication SCH TAB: at 10:34

## 2020-04-21 RX ADMIN — DOCUSATE SODIUM SCH MG: 100 CAPSULE, LIQUID FILLED ORAL at 18:00

## 2020-04-21 RX ADMIN — SEVELAMER CARBONATE SCH MG: 800 POWDER, FOR SUSPENSION ORAL at 13:24

## 2020-04-21 NOTE — NEPHROLOGY PROGRESS NOTE
Assessment/Plan


Problem List:  


(1) ESRD (end stage renal disease)


(2) Suspected COVID-19 virus infection


(3) Hyperkalemia


(4) COVID-19 virus infection


Assessment





End-stage renal disease on hemodialysis


Has arm fistula for dialysis-Next dialysis Wednesday, April 15


Dyspnea and shortness of breath, history of COPD, oxygen dependent at home


Exposure to COVID 19


Diabetes mellitus


Hypertension


Remote history of seizure


Anemia of kidney disease


Plan





COVID 19 test detected





Hemodialysis  April 20 next April 22


Keep the blood pressure and blood sugar in check


2D echocardiogram pending


Antibiotics and pulmonary support per consultants





Chest x-ray:


Findings: There is a left chest  HERO catheter, tip projected at the level of 

the


superior vena cava. Some atelectatic bands are seen in the left midlung. The 

lungs


and pleural spaces are otherwise clear. The heart size is upper limits of 

normal. A


stent is seen in the left axilla


Impression: Left midlung atelectasis. No acute process otherwise





Subjective


ROS Limited/Unobtainable:  No


Constitutional:  Reports: malaise, weakness





Objective


Objective





Last 24 Hour Vital Signs








  Date Time  Temp Pulse Resp B/P (MAP) Pulse Ox O2 Delivery O2 Flow Rate FiO2


 


4/21/20 12:00 100.1 75 21 120/49 (72) 96   


 


4/21/20 11:04 99.0       


 


4/21/20 10:34  75  121/48    


 


4/21/20 09:00      Nasal Cannula 2.0 


 


4/21/20 08:00  88      


 


4/21/20 08:00 100.8 75 20 121/48 (72) 97   


 


4/21/20 04:00  73      


 


4/21/20 04:00 98.4 87 19 99/72 (81) 92   


 


4/21/20 00:00  104      


 


4/21/20 00:00  75      


 


4/21/20 00:00 98.4 94 19 121/72 (88) 92   


 


4/20/20 21:00      Nasal Cannula 2.0 


 


4/20/20 20:00 98.6 99 20 126/69 (88) 88   


 


4/20/20 16:00  82      


 


4/20/20 16:00 98.3 68 21 128/66 (86) 93   

















Intake and Output  


 


 4/20/20 4/21/20





 19:00 07:00


 


Intake Total 420 ml 240 ml


 


Output Total 2000 ml 200 ml


 


Balance -1580 ml 40 ml


 


  


 


Intake Oral 420 ml 240 ml


 


Output Urine Total  200 ml


 


Hemodialysis UF 2000 ml 


 


# Voids 1 1











Current Medications








 Medications


  (Trade)  Dose


 Ordered  Sig/Eliazar


 Route


 PRN Reason  Start Time


 Stop Time Status Last Admin


Dose Admin


 


 Acetaminophen


  (Tylenol)  500 mg  Q6H  PRN


 ORAL


 Temp >100.5  4/15/20 01:00


 5/15/20 00:59  4/21/20 10:34


 


 


 Cinacalcet


  (Sensipar)  30 mg  DAILY


 ORAL


   4/15/20 09:00


 7/14/20 08:59  4/21/20 10:34


 


 


 Dextrose


  (Dextrose 50%)  25 ml  Q30M  PRN


 IV


 Hypoglycemia  4/15/20 01:15


 7/14/20 01:14   


 


 


 Dextrose


  (Dextrose 50%)  50 ml  Q30M  PRN


 IV


 Hypoglycemia  4/15/20 01:15


 7/14/20 01:14   


 


 


 Docusate Sodium


  (Colace)  100 mg  TWICE A  DAY


 ORAL


   4/15/20 09:00


 5/15/20 08:59  4/21/20 10:34


 


 


 Enoxaparin Sodium


  (Lovenox)  30 mg  DAILY


 SUBQ


   4/21/20 09:00


 7/20/20 08:59   


 


 


 Hydralazine HCl


  (Apresoline)  25 mg  Q4H  PRN


 ORAL


 bp over 160 syst  4/14/20 19:45


 7/13/20 19:44   


 


 


 Insulin Aspart


  (NovoLOG)    BEFORE MEALS AND  HS


 SUBQ


   4/15/20 06:30


 7/14/20 06:29   


 


 


 Labetalol HCl


  (Normodyne)  100 mg  DAILY


 ORAL


   4/15/20 09:00


 5/15/20 08:59  4/21/20 10:34


 


 


 Pantoprazole


  (Protonix)  40 mg  Q12HR


 ORAL


   4/14/20 21:00


 5/14/20 20:59  4/21/20 10:33


 


 


 Sevelamer


 Carbonate


  (Renvela)  800 mg  THREE TIMES A  DAY


 ORAL


   4/15/20 09:00


 7/14/20 08:59  4/21/20 10:35


 


 


 Vitamin B Complex/


 Vit C/Folic Acid


  (Nephrovite)  1 tab  DAILY


 ORAL


   4/15/20 09:00


 5/15/20 08:59  4/21/20 10:34


 





Laboratory Tests


4/21/20 06:00: 


White Blood Count 9.7, Red Blood Count 3.78L, Hemoglobin 10.8L, Hematocrit 32.9L

, Mean Corpuscular Volume 87, Mean Corpuscular Hemoglobin 28.5, Mean 

Corpuscular Hemoglobin Concent 32.8, Red Cell Distribution Width 13.2, Platelet 

Count 139L, Mean Platelet Volume 9.3, Neutrophils (%) (Auto) 77.5H, Lymphocytes 

(%) (Auto) 11.9L, Monocytes (%) (Auto) 9.7, Eosinophils (%) (Auto) 0.4, 

Basophils (%) (Auto) 0.4, Sodium Level 135L, Potassium Level 4.1, Chloride 

Level 97L, Carbon Dioxide Level 27, Anion Gap 11, Blood Urea Nitrogen 37H, 

Creatinine 8.3H, Estimat Glomerular Filtration Rate 6.2, Glucose Level 141H, 

Uric Acid 4.9, Calcium Level 8.3L, Phosphorus Level 2.9, Magnesium Level 2.2, 

Total Bilirubin 0.7, Aspartate Amino Transf (AST/SGOT) 44H, Alanine 

Aminotransferase (ALT/SGPT) 37, Alkaline Phosphatase 96, C-Reactive Protein, 

Quantitative 35.1H, Pro-B-Type Natriuretic Peptide 89706P, Total Protein 6.9, 

Albumin 2.4L, Globulin 4.5, Albumin/Globulin Ratio 0.5L


Height (Feet):  5


Height (Inches):  5.00


Weight (Pounds):  156


General Appearance:  no apparent distress


Cardiovascular:  normal rate


Objective


no change











Vik Morrison MD Apr 21, 2020 13:13

## 2020-04-21 NOTE — NUR
NURSE NOTES:



Received report from NI Macias. Patient is awake lying semi-nelson's; resting comfortably. 
No signs of acute distress noted; denies pain at this time. AOx4; able to make needs known. 
Primarily Swedish speaking. Ambulates with some supervision. Checked IV site; leaking. 
Discontinued IV site and will attempt to re-insert IV access at a later time. Bed at lowest 
position, brakes on, siderails up x3. Siderails padded following seizure precautions. Call 
light within reach. Will continue to monitor.

## 2020-04-21 NOTE — NUR
NURSE NOTES:



Attempted to re-establish IV access several times, but unsuccessful. Will re-attempt at a 
later time.

## 2020-04-21 NOTE — PULMONOLOGY PROGRESS NOTE
Assessment/Plan


Assessment/Plan


IMPRESSION:


1. History of COVID-19 exposure. Is + COVID 19 here


2. ESRD, on dialysis.


3. Diabetes mellitus.


4. Dyspnea.





DISCUSSION:  





Consider use of Plaquenil or azithromycin only if patient becomes hypoxic.


Currently SaO2 92-95% on RA alternating with 2L/min O2


Currently, he is doing well.  I would avoid unnecessary antibiotics.


Agree with dialysis.  I will follow carefully.














  ______________________________________________


  Oh Solomon M.D.





Subjective


Interval Events:  + COVID 19; no new complaints


Constitutional:  Reports: no symptoms


HEENT:  Repors: no symptoms


Respiratory:  Reports: no symptoms


Cardiovascular:  Reports: no symptoms


Gastrointestinal/Abdominal:  Reports: no symptoms


Allergies:  


Coded Allergies:  


     No Known Allergies (Unverified , 4/16/13)





Objective





Last 24 Hour Vital Signs








  Date Time  Temp Pulse Resp B/P (MAP) Pulse Ox O2 Delivery O2 Flow Rate FiO2


 


4/21/20 10:34  75  121/48    


 


4/21/20 08:00 100.8 75 20 121/48 (72) 97   


 


4/21/20 04:00  73      


 


4/21/20 04:00 98.4 87 19 99/72 (81) 92   


 


4/21/20 00:00  104      


 


4/21/20 00:00  75      


 


4/21/20 00:00 98.4 94 19 121/72 (88) 92   


 


4/20/20 21:00      Nasal Cannula 2.0 


 


4/20/20 20:00 98.6 99 20 126/69 (88) 88   


 


4/20/20 16:00  82      


 


4/20/20 16:00 98.3 68 21 128/66 (86) 93   


 


4/20/20 12:00  70      


 


4/20/20 12:00 98.1 66 21 127/48 (74) 98   

















Intake and Output  


 


 4/20/20 4/21/20





 19:00 07:00


 


Intake Total 420 ml 240 ml


 


Output Total 2000 ml 200 ml


 


Balance -1580 ml 40 ml


 


  


 


Intake Oral 420 ml 240 ml


 


Output Urine Total  200 ml


 


Hemodialysis UF 2000 ml 


 


# Voids 1 1








General Appearance:  no acute distress


HEENT:  normocephalic


Respiratory/Chest:  chest wall non-tender, lungs clear


Cardiovascular:  normal peripheral pulses


Laboratory Tests


4/21/20 06:00: 


White Blood Count 9.7, Red Blood Count 3.78L, Hemoglobin 10.8L, Hematocrit 32.9L

, Mean Corpuscular Volume 87, Mean Corpuscular Hemoglobin 28.5, Mean 

Corpuscular Hemoglobin Concent 32.8, Red Cell Distribution Width 13.2, Platelet 

Count 139L, Mean Platelet Volume 9.3, Neutrophils (%) (Auto) 77.5H, Lymphocytes 

(%) (Auto) 11.9L, Monocytes (%) (Auto) 9.7, Eosinophils (%) (Auto) 0.4, 

Basophils (%) (Auto) 0.4, Sodium Level 135L, Potassium Level 4.1, Chloride 

Level 97L, Carbon Dioxide Level 27, Anion Gap 11, Blood Urea Nitrogen 37H, 

Creatinine 8.3H, Estimat Glomerular Filtration Rate 6.2, Glucose Level 141H, 

Uric Acid 4.9, Calcium Level 8.3L, Phosphorus Level 2.9, Magnesium Level 2.2, 

Total Bilirubin 0.7, Aspartate Amino Transf (AST/SGOT) 44H, Alanine 

Aminotransferase (ALT/SGPT) 37, Alkaline Phosphatase 96, C-Reactive Protein, 

Quantitative 35.1H, Pro-B-Type Natriuretic Peptide 47514O, Total Protein 6.9, 

Albumin 2.4L, Globulin 4.5, Albumin/Globulin Ratio 0.5L





Current Medications








 Medications


  (Trade)  Dose


 Ordered  Sig/Eliazar


 Route


 PRN Reason  Start Time


 Stop Time Status Last Admin


Dose Admin


 


 Acetaminophen


  (Tylenol)  500 mg  Q6H  PRN


 ORAL


 Temp >100.5  4/15/20 01:00


 5/15/20 00:59  4/21/20 10:34


 


 


 Cinacalcet


  (Sensipar)  30 mg  DAILY


 ORAL


   4/15/20 09:00


 7/14/20 08:59  4/21/20 10:34


 


 


 Dextrose


  (Dextrose 50%)  25 ml  Q30M  PRN


 IV


 Hypoglycemia  4/15/20 01:15


 7/14/20 01:14   


 


 


 Dextrose


  (Dextrose 50%)  50 ml  Q30M  PRN


 IV


 Hypoglycemia  4/15/20 01:15


 7/14/20 01:14   


 


 


 Docusate Sodium


  (Colace)  100 mg  TWICE A  DAY


 ORAL


   4/15/20 09:00


 5/15/20 08:59  4/21/20 10:34


 


 


 Enoxaparin Sodium


  (Lovenox)  30 mg  DAILY


 SUBQ


   4/21/20 09:00


 7/20/20 08:59   


 


 


 Hydralazine HCl


  (Apresoline)  25 mg  Q4H  PRN


 ORAL


 bp over 160 syst  4/14/20 19:45


 7/13/20 19:44   


 


 


 Insulin Aspart


  (NovoLOG)    BEFORE MEALS AND  HS


 SUBQ


   4/15/20 06:30


 7/14/20 06:29   


 


 


 Labetalol HCl


  (Normodyne)  100 mg  DAILY


 ORAL


   4/15/20 09:00


 5/15/20 08:59  4/21/20 10:34


 


 


 Pantoprazole


  (Protonix)  40 mg  Q12HR


 ORAL


   4/14/20 21:00


 5/14/20 20:59  4/21/20 10:33


 


 


 Sevelamer


 Carbonate


  (Renvela)  800 mg  THREE TIMES A  DAY


 ORAL


   4/15/20 09:00


 7/14/20 08:59  4/21/20 10:35


 


 


 Vitamin B Complex/


 Vit C/Folic Acid


  (Nephrovite)  1 tab  DAILY


 ORAL


   4/15/20 09:00


 5/15/20 08:59  4/21/20 10:34


 

















Oh Solomon MD Apr 21, 2020 11:10

## 2020-04-21 NOTE — HEMATOLOGY/ONC PROGRESS NOTE
Assessment/Plan


Assessment/Plan


Asses/Recs


# Elevated D-dimer on admission, with sob, in this case r/o dvt of lower ext


--> less likely pe, first r/o dvt given more likely viral cause possible


--> obtain duplex of lower ext--> NEG FOR pe


--> repeat in future in 3mo


# Anemia of chronic disease due to underlying chronic medical issues, 

multifactorial v Gi bleed 


--> Anemia workup has been ordered, rule out gi bleed 


--> No evidence of hemolysis is noted, peripheral smear has been reviewed.


--> Hgb goal >7. Transfuse prn.


--> Epogen or iron at this time is not particularly indicated


--> Medications have been reviewed


--> hgb trend: 9.8 -->10


# Dyspnea


--> appears likely covid related


--> pulm aware


# ESRD (end stage renal disease)


--> hd as per Dr. Morrison


# Seizures.


# Hypertension.


# Diabetes mellitus.


# Dvt ppx lovenox sq





The timing of this note does not necessarily reflect the time of the patient 

was seen.





Greatly appreciate consultation.





Subjective


Constitutional:  Denies: no symptoms, chills, fever, malaise, weakness, other


HEENT:  Denies: no symptoms, eye pain, blurred vision, tearing, double vision, 

ear pain, ear discharge, nose pain, nose congestion, throat pain, throat 

swelling, mouth pain, mouth swelling, other


Cardiovascular:  Denies: no symptoms, chest pain, edema, irregular heart rate, 

lightheadedness, palpitations, syncope, other


Gastrointestinal/Abdominal:  Denies: no symptoms, abdomen distended, abdominal 

pain, black stools, tarry stools, blood in stool, constipated, diarrhea, 

difficulty swallowing, nausea, poor appetite, poor fluid intake, rectal bleeding

, vomiting, other


Genitourinary:  Denies: no symptoms, burning, discharge, frequency, flank pain, 

hematuria, incontinence, pain, urgency, other


Neurologic/Psychiatric:  Denies: no symptoms, anxiety, depressed, emotional 

problems, headache, numbness, paresthesia, pre-existing deficit, seizure, 

tingling, tremors, weakness, other


Endocrine:  Denies: no symptoms, excessive sweating, flushing, intolerance to 

cold, intolerance to heat, increased hunger, increased thirst, increased urine, 

unexplained weight gain, unexplained weight loss, other


Allergies:  


Coded Allergies:  


     No Known Allergies (Unverified , 4/16/13)


Subjective


4/16 no bleeding , labs noted, hd as needed, renal aware


4/17 on tele, no acute events, nc 2l, sodium 135, hd for today 


4/19 eating breakfast, no overnight events, meds reviewed 


4/20 no events, no bleeding, no fc, no night sweats, hgb 10, hd today


4/21 no major changes, remains lucid, no bleeding, labs noted, no bleeding





Objective


Objective





Current Medications








 Medications


  (Trade)  Dose


 Ordered  Sig/Eliazar


 Route


 PRN Reason  Start Time


 Stop Time Status Last Admin


Dose Admin


 


 Acetaminophen


  (Tylenol)  500 mg  Q6H  PRN


 ORAL


 Temp >100.5  4/15/20 01:00


 5/15/20 00:59  4/18/20 23:22


 


 


 Cinacalcet


  (Sensipar)  30 mg  DAILY


 ORAL


   4/15/20 09:00


 7/14/20 08:59  4/20/20 08:45


 


 


 Dextrose


  (Dextrose 50%)  25 ml  Q30M  PRN


 IV


 Hypoglycemia  4/15/20 01:15


 7/14/20 01:14   


 


 


 Dextrose


  (Dextrose 50%)  50 ml  Q30M  PRN


 IV


 Hypoglycemia  4/15/20 01:15


 7/14/20 01:14   


 


 


 Docusate Sodium


  (Colace)  100 mg  TWICE A  DAY


 ORAL


   4/15/20 09:00


 5/15/20 08:59  4/20/20 17:32


 


 


 Hydralazine HCl


  (Apresoline)  25 mg  Q4H  PRN


 ORAL


 bp over 160 syst  4/14/20 19:45


 7/13/20 19:44   


 


 


 Insulin Aspart


  (NovoLOG)    BEFORE MEALS AND  HS


 SUBQ


   4/15/20 06:30


 7/14/20 06:29   


 


 


 Labetalol HCl


  (Normodyne)  100 mg  DAILY


 ORAL


   4/15/20 09:00


 5/15/20 08:59  4/19/20 09:51


 


 


 Pantoprazole


  (Protonix)  40 mg  Q12HR


 ORAL


   4/14/20 21:00


 5/14/20 20:59  4/20/20 21:44


 


 


 Sevelamer


 Carbonate


  (Renvela)  800 mg  THREE TIMES A  DAY


 ORAL


   4/15/20 09:00


 7/14/20 08:59  4/20/20 17:32


 


 


 Vitamin B Complex/


 Vit C/Folic Acid


  (Nephrovite)  1 tab  DAILY


 ORAL


   4/15/20 09:00


 5/15/20 08:59  4/20/20 08:45


 











Last 24 Hour Vital Signs








  Date Time  Temp Pulse Resp B/P (MAP) Pulse Ox O2 Delivery O2 Flow Rate FiO2


 


4/21/20 04:00  73      


 


4/21/20 04:00 98.4 87 19 99/72 (81) 92   


 


4/21/20 00:00  104      


 


4/21/20 00:00  75      


 


4/21/20 00:00 98.4 94 19 121/72 (88) 92   


 


4/20/20 21:00      Nasal Cannula 2.0 


 


4/20/20 20:00 98.6 99 20 126/69 (88) 88   


 


4/20/20 16:00  82      


 


4/20/20 16:00 98.3 68 21 128/66 (86) 93   


 


4/20/20 12:00  70      


 


4/20/20 12:00 98.1 66 21 127/48 (74) 98   


 


4/20/20 09:00  73  124/71    


 


4/20/20 09:00      Nasal Cannula 2.0 


 


4/20/20 08:00  71      


 


4/20/20 08:00 98.4 73 21 124/71 (88) 93   


 


4/20/20 04:00  65      


 


4/20/20 00:00  66      


 


4/19/20 21:00      Nasal Cannula 2.0 


 


4/19/20 20:00  64      


 


4/19/20 16:00  62      


 


4/19/20 12:00  75      


 


4/19/20 12:00 97.9 67 18 111/55 (73) 91   


 


4/19/20 09:51  71  131/52    


 


4/19/20 09:00      Nasal Cannula 2.0 


 


4/19/20 08:00 98.3 70 19 131/52 (78) 92   


 


4/19/20 08:00  71      

















Intake and Output  


 


 4/20/20 4/21/20





 19:00 07:00


 


Intake Total 420 ml 240 ml


 


Output Total 2000 ml 200 ml


 


Balance -1580 ml 40 ml


 


  


 


Intake Oral 420 ml 240 ml


 


Output Urine Total  200 ml


 


Hemodialysis UF 2000 ml 


 


# Voids 1 1











Labs








Test


  4/19/20


12:05


 


White Blood Count


  8.1 K/UL


(4.8-10.8)


 


Red Blood Count


  3.58 M/UL


(4.70-6.10)


 


Hemoglobin


  10.2 G/DL


(14.2-18.0)


 


Hematocrit


  31.3 %


(42.0-52.0)


 


Mean Corpuscular Volume 87 FL (80-99) 


 


Mean Corpuscular Hemoglobin


  28.4 PG


(27.0-31.0)


 


Mean Corpuscular Hemoglobin


Concent 32.5 G/DL


(32.0-36.0)


 


Red Cell Distribution Width


  13.0 %


(11.6-14.8)


 


Platelet Count


  121 K/UL


(150-450)


 


Mean Platelet Volume


  7.8 FL


(6.5-10.1)


 


Neutrophils (%) (Auto)


  76.6 %


(45.0-75.0)


 


Lymphocytes (%) (Auto)


  13.9 %


(20.0-45.0)


 


Monocytes (%) (Auto)


  7.8 %


(1.0-10.0)


 


Eosinophils (%) (Auto)


  1.1 %


(0.0-3.0)


 


Basophils (%) (Auto)


  0.5 %


(0.0-2.0)


 


D-Dimer


  1.70 mg/L FEU


(0.00-0.49)


 


Sodium Level


  137 MMOL/L


(136-145)


 


Potassium Level


  4.3 MMOL/L


(3.5-5.1)


 


Chloride Level


  96 MMOL/L


()


 


Carbon Dioxide Level


  27 MMOL/L


(21-32)


 


Anion Gap


  14 mmol/L


(5-15)


 


Blood Urea Nitrogen


  50 mg/dL


(7-18)


 


Creatinine


  9.5 MG/DL


(0.55-1.30)


 


Estimat Glomerular Filtration


Rate 5.3 mL/min


(>60)


 


Glucose Level


  121 MG/DL


()


 


Calcium Level


  8.0 MG/DL


(8.5-10.1)


 


Phosphorus Level


  3.8 MG/DL


(2.5-4.9)


 


Total Bilirubin


  0.8 MG/DL


(0.2-1.0)


 


Aspartate Amino Transf


(AST/SGOT) 34 U/L (15-37) 


 


 


Alanine Aminotransferase


(ALT/SGPT) 42 U/L (12-78) 


 


 


Alkaline Phosphatase


  82 U/L


()


 


Lactate Dehydrogenase


  277 U/L


()


 


C-Reactive Protein,


Quantitative 39.3 mg/dL


(0.00-0.90)


 


Pro-B-Type Natriuretic Peptide


  66297 pg/mL


(0-125)


 


Total Protein


  7.1 G/DL


(6.4-8.2)


 


Albumin


  2.8 G/DL


(3.4-5.0)


 


Globulin 4.3 g/dL 


 


Albumin/Globulin Ratio 0.7 (1.0-2.7) 








Height (Feet):  5


Height (Inches):  5.00


Weight (Pounds):  156


Objective





Physical Exam


Vitals: reviewed


General: NAD


HEENT: nc, at


Neck: supple


Chest: clear breath sounds bilaterally


Cardiovascular: RRR, no s3, s4


Neuro: alert and oriented











Mt Staley MD Apr 21, 2020 06:02

## 2020-04-21 NOTE — GENERAL PROGRESS NOTE
Assessment/Plan


Problem List:  


(1) Hyperkalemia


ICD Codes:  E87.5 - Hyperkalemia


SNOMED:  63117815


(2) ESRD (end stage renal disease)


ICD Codes:  N18.6 - End stage renal disease


SNOMED:  59728910


(3) Suspected COVID-19 virus infection


ICD Codes:  R68.89 - Other general symptoms and signs


SNOMED:  244407325


Status:  progressing


Assessment/Plan:


covid positive


esrd on hd


pna


no fever


weak


poor appetite


respiratory insuff


repeat covid test





Subjective


ROS Limited/Unobtainable:  Yes


Allergies:  


Coded Allergies:  


     No Known Allergies (Unverified , 4/16/13)





Objective





Last 24 Hour Vital Signs








  Date Time  Temp Pulse Resp B/P (MAP) Pulse Ox O2 Delivery O2 Flow Rate FiO2


 


4/21/20 16:00 99.9 80 20 125/51 (75) 97   


 


4/21/20 16:00  64      


 


4/21/20 12:00 100.1 75 21 120/49 (72) 96   


 


4/21/20 12:00  88      


 


4/21/20 11:04 99.0       


 


4/21/20 10:34  75  121/48    


 


4/21/20 09:00      Nasal Cannula 2.0 


 


4/21/20 08:00  88      


 


4/21/20 08:00 100.8 75 20 121/48 (72) 97   


 


4/21/20 04:00  73      


 


4/21/20 04:00 98.4 87 19 99/72 (81) 92   


 


4/21/20 00:00  104      


 


4/21/20 00:00  75      


 


4/21/20 00:00 98.4 94 19 121/72 (88) 92   

















Intake and Output  


 


 4/20/20 4/21/20





 19:00 07:00


 


Intake Total 420 ml 240 ml


 


Output Total 2000 ml 200 ml


 


Balance -1580 ml 40 ml


 


  


 


Intake Oral 420 ml 240 ml


 


Output Urine Total  200 ml


 


Hemodialysis UF 2000 ml 


 


# Voids 1 1








Laboratory Tests


4/21/20 06:00: 


White Blood Count 9.7, Red Blood Count 3.78L, Hemoglobin 10.8L, Hematocrit 32.9L

, Mean Corpuscular Volume 87, Mean Corpuscular Hemoglobin 28.5, Mean 

Corpuscular Hemoglobin Concent 32.8, Red Cell Distribution Width 13.2, Platelet 

Count 139L, Mean Platelet Volume 9.3, Neutrophils (%) (Auto) 77.5H, Lymphocytes 

(%) (Auto) 11.9L, Monocytes (%) (Auto) 9.7, Eosinophils (%) (Auto) 0.4, 

Basophils (%) (Auto) 0.4, Sodium Level 135L, Potassium Level 4.1, Chloride 

Level 97L, Carbon Dioxide Level 27, Anion Gap 11, Blood Urea Nitrogen 37H, 

Creatinine 8.3H, Estimat Glomerular Filtration Rate 6.2, Glucose Level 141H, 

Uric Acid 4.9, Calcium Level 8.3L, Phosphorus Level 2.9, Magnesium Level 2.2, 

Total Bilirubin 0.7, Aspartate Amino Transf (AST/SGOT) 44H, Alanine 

Aminotransferase (ALT/SGPT) 37, Alkaline Phosphatase 96, C-Reactive Protein, 

Quantitative 35.1H, Pro-B-Type Natriuretic Peptide 66007U, Total Protein 6.9, 

Albumin 2.4L, Globulin 4.5, Albumin/Globulin Ratio 0.5L


Height (Feet):  5


Height (Inches):  5.00


Weight (Pounds):  156











Van Silva MD Apr 21, 2020 22:45

## 2020-04-22 VITALS — DIASTOLIC BLOOD PRESSURE: 80 MMHG | SYSTOLIC BLOOD PRESSURE: 135 MMHG

## 2020-04-22 VITALS — DIASTOLIC BLOOD PRESSURE: 85 MMHG | SYSTOLIC BLOOD PRESSURE: 135 MMHG

## 2020-04-22 VITALS — SYSTOLIC BLOOD PRESSURE: 138 MMHG | DIASTOLIC BLOOD PRESSURE: 80 MMHG

## 2020-04-22 VITALS — DIASTOLIC BLOOD PRESSURE: 59 MMHG | SYSTOLIC BLOOD PRESSURE: 119 MMHG

## 2020-04-22 VITALS — DIASTOLIC BLOOD PRESSURE: 70 MMHG | SYSTOLIC BLOOD PRESSURE: 126 MMHG

## 2020-04-22 VITALS — SYSTOLIC BLOOD PRESSURE: 126 MMHG | DIASTOLIC BLOOD PRESSURE: 63 MMHG

## 2020-04-22 LAB
ADD MANUAL DIFF: NO
BASOPHILS NFR BLD AUTO: 0.6 % (ref 0–2)
EOSINOPHIL NFR BLD AUTO: 0.5 % (ref 0–3)
ERYTHROCYTE [DISTWIDTH] IN BLOOD BY AUTOMATED COUNT: 12.8 % (ref 11.6–14.8)
HCT VFR BLD CALC: 29.2 % (ref 42–52)
HGB BLD-MCNC: 9.5 G/DL (ref 14.2–18)
LYMPHOCYTES NFR BLD AUTO: 14.3 % (ref 20–45)
MCV RBC AUTO: 86 FL (ref 80–99)
MONOCYTES NFR BLD AUTO: 8.5 % (ref 1–10)
NEUTROPHILS NFR BLD AUTO: 76.2 % (ref 45–75)
PLATELET # BLD: 190 K/UL (ref 150–450)
RBC # BLD AUTO: 3.38 M/UL (ref 4.7–6.1)
WBC # BLD AUTO: 10.3 K/UL (ref 4.8–10.8)

## 2020-04-22 RX ADMIN — SEVELAMER CARBONATE SCH MG: 800 POWDER, FOR SUSPENSION ORAL at 12:35

## 2020-04-22 RX ADMIN — INSULIN ASPART SCH UNITS: 100 INJECTION, SOLUTION INTRAVENOUS; SUBCUTANEOUS at 11:30

## 2020-04-22 RX ADMIN — DOCUSATE SODIUM SCH MG: 100 CAPSULE, LIQUID FILLED ORAL at 08:39

## 2020-04-22 RX ADMIN — Medication SCH TAB: at 08:40

## 2020-04-22 RX ADMIN — INSULIN ASPART SCH UNITS: 100 INJECTION, SOLUTION INTRAVENOUS; SUBCUTANEOUS at 06:30

## 2020-04-22 RX ADMIN — SEVELAMER CARBONATE SCH MG: 800 POWDER, FOR SUSPENSION ORAL at 17:19

## 2020-04-22 RX ADMIN — INSULIN ASPART SCH UNITS: 100 INJECTION, SOLUTION INTRAVENOUS; SUBCUTANEOUS at 21:00

## 2020-04-22 RX ADMIN — DOCUSATE SODIUM SCH MG: 100 CAPSULE, LIQUID FILLED ORAL at 17:18

## 2020-04-22 RX ADMIN — INSULIN ASPART SCH UNITS: 100 INJECTION, SOLUTION INTRAVENOUS; SUBCUTANEOUS at 16:43

## 2020-04-22 RX ADMIN — ENOXAPARIN SODIUM SCH MG: 30 INJECTION SUBCUTANEOUS at 09:00

## 2020-04-22 RX ADMIN — CINACALCET HYDROCHLORIDE SCH MG: 30 TABLET, COATED ORAL at 08:39

## 2020-04-22 RX ADMIN — SEVELAMER CARBONATE SCH MG: 800 POWDER, FOR SUSPENSION ORAL at 08:39

## 2020-04-22 NOTE — NUR
RD ASSESSMENT & RECOMMENDATIONS

SEE CARE ACTIVITY FOR COMPLETE ASSESSMENT



DAILY ESTIMATED NEEDS:

Needs based on ESRD + HD/ 64kg abw 

25-30  kcals/kg 

[id number]  total kcals

1.2-1.8  g protein/kg

  g total protein 

Fluids per MD, pt on HD  mL/kg

 total fluid mLs



NUTRITION DIAGNOSIS:

Altered nutrition related lab values R/T ESRD, h/o DM as evidenced by elev

creat (8.3), elev BNP (36939-> 10824), elev A1C 5.9.



CURRENT DIET: RENAL     



PO DIET RECOMMENDATIONS:

RENAL + CCHO MED/ texture as tolerated 





ADDITIONAL RECOMMENDATIONS:

* Daily calibrated bedscale wt 

* Monitor BGs, need for hypoglycemics: h/o DM-> now on cecelia 

* Add Nepro x 1 w/ variable PO intake (425kcal/19g prot each) 

* Monitor lytes

## 2020-04-22 NOTE — HEMATOLOGY/ONC PROGRESS NOTE
Assessment/Plan


Assessment/Plan


Asses/Recs


# Elevated D-dimer on admission, with sob, in this case r/o dvt of lower ext


--> less likely pe, first r/o dvt given more likely viral cause possible


--> obtain duplex of lower ext--> NEG FOR pe


--> repeat in future in 3mo


# Anemia of chronic disease due to underlying chronic medical issues, 

multifactorial v Gi bleed 


--> Anemia workup has been ordered, rule out gi bleed 


--> No evidence of hemolysis is noted, peripheral smear has been reviewed.


--> Hgb goal >7. Transfuse prn.


--> Epogen or iron at this time is not particularly indicated


--> Medications have been reviewed


--> hgb trend: 9.8 -->10-->10.8


# Dyspnea


--> appears likely covid related


--> pulm aware


# ESRD (end stage renal disease)


--> hd as per Dr. Morrison


# Seizures.


# Hypertension.


# Diabetes mellitus.


# Dvt ppx lovenox sq





The timing of this note does not necessarily reflect the time of the patient 

was seen.





Greatly appreciate consultation.





Subjective


Respiratory:  Denies: no symptoms, cough, shortness of breath, SOB with 

excertion, SOB at rest, sputum, wheezing, other


Gastrointestinal/Abdominal:  Denies: no symptoms, abdomen distended, abdominal 

pain, black stools, tarry stools, blood in stool, constipated, diarrhea, 

difficulty swallowing, nausea, poor appetite, poor fluid intake, rectal bleeding

, vomiting, other


Genitourinary:  Denies: no symptoms, burning, discharge, frequency, flank pain, 

hematuria, incontinence, pain, urgency, other


Neurologic/Psychiatric:  Denies: no symptoms, anxiety, depressed, emotional 

problems, headache, numbness, paresthesia, pre-existing deficit, seizure, 

tingling, tremors, weakness, other


Endocrine:  Denies: no symptoms, excessive sweating, flushing, intolerance to 

cold, intolerance to heat, increased hunger, increased thirst, increased urine, 

unexplained weight gain, unexplained weight loss, other


Hematologic/Lymphatic:  Denies: no symptoms, anemia, easy bleeding, easy 

bruising, adenopathy, other


Allergies:  


Coded Allergies:  


     No Known Allergies (Unverified , 4/16/13)


Subjective


4/16 no bleeding , labs noted, hd as needed, renal aware


4/17 on tele, no acute events, nc 2l, sodium 135, hd for today 


4/19 eating breakfast, no overnight events, meds reviewed 


4/20 no events, no bleeding, no fc, no night sweats, hgb 10, hd today


4/21 no major changes, remains lucid, no bleeding, labs noted


4/22 labs noted, difficult stick, labs ordered, no bleeding





Objective


Objective





Current Medications








 Medications


  (Trade)  Dose


 Ordered  Sig/Eliazar


 Route


 PRN Reason  Start Time


 Stop Time Status Last Admin


Dose Admin


 


 Acetaminophen


  (Tylenol)  500 mg  Q6H  PRN


 ORAL


 Temp >100.5  4/15/20 01:00


 5/15/20 00:59  4/21/20 10:34


 


 


 Cinacalcet


  (Sensipar)  30 mg  DAILY


 ORAL


   4/15/20 09:00


 7/14/20 08:59  4/21/20 10:34


 


 


 Dextrose


  (Dextrose 50%)  25 ml  Q30M  PRN


 IV


 Hypoglycemia  4/15/20 01:15


 7/14/20 01:14   


 


 


 Dextrose


  (Dextrose 50%)  50 ml  Q30M  PRN


 IV


 Hypoglycemia  4/15/20 01:15


 7/14/20 01:14   


 


 


 Docusate Sodium


  (Colace)  100 mg  TWICE A  DAY


 ORAL


   4/15/20 09:00


 5/15/20 08:59  4/21/20 10:34


 


 


 Enoxaparin Sodium


  (Lovenox)  30 mg  DAILY


 SUBQ


   4/21/20 09:00


 7/20/20 08:59   


 


 


 Hydralazine HCl


  (Apresoline)  25 mg  Q4H  PRN


 ORAL


 bp over 160 syst  4/14/20 19:45


 7/13/20 19:44   


 


 


 Insulin Aspart


  (NovoLOG)    BEFORE MEALS AND  HS


 SUBQ


   4/15/20 06:30


 7/14/20 06:29  4/21/20 21:58


 


 


 Labetalol HCl


  (Normodyne)  100 mg  DAILY


 ORAL


   4/15/20 09:00


 5/15/20 08:59  4/21/20 10:34


 


 


 Pantoprazole


  (Protonix)  40 mg  Q12HR


 ORAL


   4/14/20 21:00


 5/14/20 20:59  4/21/20 20:40


 


 


 Sevelamer


 Carbonate


  (Renvela)  800 mg  THREE TIMES A  DAY


 ORAL


   4/15/20 09:00


 7/14/20 08:59  4/21/20 13:24


 


 


 Vitamin B Complex/


 Vit C/Folic Acid


  (Nephrovite)  1 tab  DAILY


 ORAL


   4/15/20 09:00


 5/15/20 08:59  4/21/20 10:34


 











Last 24 Hour Vital Signs








  Date Time  Temp Pulse Resp B/P (MAP) Pulse Ox O2 Delivery O2 Flow Rate FiO2


 


4/22/20 04:00  61      


 


4/22/20 04:00 98.1 89 18 135/85 (102) 98   


 


4/22/20 00:00  64      


 


4/22/20 00:00 97.9 80 19 138/80 (99) 96   


 


4/21/20 21:00      Room Air  


 


4/21/20 20:00  70      


 


4/21/20 20:00 98.9 80 17 142/67 (92) 96   


 


4/21/20 16:00 99.9 80 20 125/51 (75) 97   


 


4/21/20 16:00  64      


 


4/21/20 12:00 100.1 75 21 120/49 (72) 96   


 


4/21/20 12:00  88      


 


4/21/20 11:04 99.0       


 


4/21/20 10:34  75  121/48    


 


4/21/20 09:00      Nasal Cannula 2.0 


 


4/21/20 08:00  88      


 


4/21/20 08:00 100.8 75 20 121/48 (72) 97   


 


4/21/20 04:00  73      


 


4/21/20 04:00 98.4 87 19 99/72 (81) 92   


 


4/21/20 00:00  104      


 


4/21/20 00:00  75      


 


4/21/20 00:00 98.4 94 19 121/72 (88) 92   


 


4/20/20 21:00      Nasal Cannula 2.0 


 


4/20/20 20:00 98.6 99 20 126/69 (88) 88   


 


4/20/20 16:00  82      


 


4/20/20 16:00 98.3 68 21 128/66 (86) 93   


 


4/20/20 12:00  70      


 


4/20/20 12:00 98.1 66 21 127/48 (74) 98   


 


4/20/20 09:00  73  124/71    


 


4/20/20 09:00      Nasal Cannula 2.0 


 


4/20/20 08:00  71      


 


4/20/20 08:00 98.4 73 21 124/71 (88) 93   

















Intake and Output  


 


 4/21/20 4/22/20





 19:00 07:00


 


Intake Total  800 ml


 


Balance  800 ml


 


  


 


Intake Oral  800 ml


 


# Voids  2











Labs








Test


  4/19/20


12:05 4/21/20


06:00


 


White Blood Count


  8.1 K/UL


(4.8-10.8) 9.7 K/UL


(4.8-10.8)


 


Red Blood Count


  3.58 M/UL


(4.70-6.10) 3.78 M/UL


(4.70-6.10)


 


Hemoglobin


  10.2 G/DL


(14.2-18.0) 10.8 G/DL


(14.2-18.0)


 


Hematocrit


  31.3 %


(42.0-52.0) 32.9 %


(42.0-52.0)


 


Mean Corpuscular Volume 87 FL (80-99)  87 FL (80-99) 


 


Mean Corpuscular Hemoglobin


  28.4 PG


(27.0-31.0) 28.5 PG


(27.0-31.0)


 


Mean Corpuscular Hemoglobin


Concent 32.5 G/DL


(32.0-36.0) 32.8 G/DL


(32.0-36.0)


 


Red Cell Distribution Width


  13.0 %


(11.6-14.8) 13.2 %


(11.6-14.8)


 


Platelet Count


  121 K/UL


(150-450) 139 K/UL


(150-450)


 


Mean Platelet Volume


  7.8 FL


(6.5-10.1) 9.3 FL


(6.5-10.1)


 


Neutrophils (%) (Auto)


  76.6 %


(45.0-75.0) 77.5 %


(45.0-75.0)


 


Lymphocytes (%) (Auto)


  13.9 %


(20.0-45.0) 11.9 %


(20.0-45.0)


 


Monocytes (%) (Auto)


  7.8 %


(1.0-10.0) 9.7 %


(1.0-10.0)


 


Eosinophils (%) (Auto)


  1.1 %


(0.0-3.0) 0.4 %


(0.0-3.0)


 


Basophils (%) (Auto)


  0.5 %


(0.0-2.0) 0.4 %


(0.0-2.0)


 


D-Dimer


  1.70 mg/L FEU


(0.00-0.49) 


 


 


Sodium Level


  137 MMOL/L


(136-145) 135 MMOL/L


(136-145)


 


Potassium Level


  4.3 MMOL/L


(3.5-5.1) 4.1 MMOL/L


(3.5-5.1)


 


Chloride Level


  96 MMOL/L


() 97 MMOL/L


()


 


Carbon Dioxide Level


  27 MMOL/L


(21-32) 27 MMOL/L


(21-32)


 


Anion Gap


  14 mmol/L


(5-15) 11 mmol/L


(5-15)


 


Blood Urea Nitrogen


  50 mg/dL


(7-18) 37 mg/dL


(7-18)


 


Creatinine


  9.5 MG/DL


(0.55-1.30) 8.3 MG/DL


(0.55-1.30)


 


Estimat Glomerular Filtration


Rate 5.3 mL/min


(>60) 6.2 mL/min


(>60)


 


Glucose Level


  121 MG/DL


() 141 MG/DL


()


 


Calcium Level


  8.0 MG/DL


(8.5-10.1) 8.3 MG/DL


(8.5-10.1)


 


Phosphorus Level


  3.8 MG/DL


(2.5-4.9) 2.9 MG/DL


(2.5-4.9)


 


Total Bilirubin


  0.8 MG/DL


(0.2-1.0) 0.7 MG/DL


(0.2-1.0)


 


Aspartate Amino Transf


(AST/SGOT) 34 U/L (15-37) 


  44 U/L (15-37) 


 


 


Alanine Aminotransferase


(ALT/SGPT) 42 U/L (12-78) 


  37 U/L (12-78) 


 


 


Alkaline Phosphatase


  82 U/L


() 96 U/L


()


 


Lactate Dehydrogenase


  277 U/L


() 


 


 


C-Reactive Protein,


Quantitative 39.3 mg/dL


(0.00-0.90) 35.1 mg/dL


(0.00-0.90)


 


Pro-B-Type Natriuretic Peptide


  08215 pg/mL


(0-125) 65832 pg/mL


(0-125)


 


Total Protein


  7.1 G/DL


(6.4-8.2) 6.9 G/DL


(6.4-8.2)


 


Albumin


  2.8 G/DL


(3.4-5.0) 2.4 G/DL


(3.4-5.0)


 


Globulin 4.3 g/dL  4.5 g/dL 


 


Albumin/Globulin Ratio 0.7 (1.0-2.7)  0.5 (1.0-2.7) 


 


Uric Acid


  


  4.9 MG/DL


(2.6-7.2)


 


Magnesium Level


  


  2.2 MG/DL


(1.8-2.4)








Height (Feet):  5


Height (Inches):  5.00


Weight (Pounds):  156


Objective





Physical Exam


Vitals: reviewed


General: NAD


HEENT: nc, at


Neck: supple


Chest: clear breath sounds bilaterally


Cardiovascular: RRR, no s3, s4


Neuro: alert and oriented











Mt Staley MD Apr 22, 2020 06:04

## 2020-04-22 NOTE — PULMONOLOGY PROGRESS NOTE
Assessment/Plan


Assessment/Plan


IMPRESSION:


1. History of COVID-19 exposure. Is + COVID 19 here


2. ESRD, on dialysis.


3. Diabetes mellitus.


4. Dyspnea.





DISCUSSION:  





Consider use of Plaquenil or azithromycin only if patient becomes hypoxic.


Currently SaO2 92-95% on RA alternating with 2L/min O2


Currently, he is doing well.  I would avoid unnecessary antibiotics.


Agree with dialysis.  I will follow carefully.














  ______________________________________________


  Oh Solomon M.D.





Subjective


ROS Limited/Unobtainable:  Yes


Interval Events:  + COVID 19; no new complaints


Constitutional:  Reports: no symptoms


HEENT:  Repors: no symptoms


Respiratory:  Reports: no symptoms


Cardiovascular:  Reports: no symptoms


Gastrointestinal/Abdominal:  Reports: no symptoms


Allergies:  


Coded Allergies:  


     No Known Allergies (Unverified , 4/16/13)





Objective





Last 24 Hour Vital Signs








  Date Time  Temp Pulse Resp B/P (MAP) Pulse Ox O2 Delivery O2 Flow Rate FiO2


 


4/22/20 04:00  61      


 


4/22/20 04:00 98.1 89 18 135/85 (102) 98   


 


4/22/20 00:00  64      


 


4/22/20 00:00 97.9 80 19 138/80 (99) 96   


 


4/21/20 21:00      Room Air  


 


4/21/20 20:00  70      


 


4/21/20 20:00 98.9 80 17 142/67 (92) 96   


 


4/21/20 16:00 99.9 80 20 125/51 (75) 97   


 


4/21/20 16:00  64      


 


4/21/20 12:00 100.1 75 21 120/49 (72) 96   


 


4/21/20 12:00  88      


 


4/21/20 11:04 99.0       

















Intake and Output  


 


 4/21/20 4/22/20





 19:00 07:00


 


Intake Total  1040 ml


 


Balance  1040 ml


 


  


 


Intake Oral  1040 ml


 


# Voids  4


 


# Bowel Movements  1








General Appearance:  no acute distress


HEENT:  normocephalic


Respiratory/Chest:  chest wall non-tender, lungs clear


Cardiovascular:  normal peripheral pulses


Abdomen:  soft, non tender


Extremities:  no edema


Neurologic/Psychiatric:  alert, responsive


Laboratory Tests


4/22/20 07:55: 


White Blood Count 10.3, Red Blood Count 3.38L, Hemoglobin 9.5L, Hematocrit 29.2L

, Mean Corpuscular Volume 86, Mean Corpuscular Hemoglobin 28.1, Mean 

Corpuscular Hemoglobin Concent 32.5, Red Cell Distribution Width 12.8, Platelet 

Count 190, Mean Platelet Volume 8.7, Neutrophils (%) (Auto) 76.2H, Lymphocytes (

%) (Auto) 14.3L, Monocytes (%) (Auto) 8.5, Eosinophils (%) (Auto) 0.5, 

Basophils (%) (Auto) 0.6





Current Medications








 Medications


  (Trade)  Dose


 Ordered  Sig/Eliazar


 Route


 PRN Reason  Start Time


 Stop Time Status Last Admin


Dose Admin


 


 Acetaminophen


  (Tylenol)  500 mg  Q6H  PRN


 ORAL


 Temp >100.5  4/15/20 01:00


 5/15/20 00:59  4/21/20 10:34


 


 


 Cinacalcet


  (Sensipar)  30 mg  DAILY


 ORAL


   4/15/20 09:00


 7/14/20 08:59  4/22/20 08:39


 


 


 Dextrose


  (Dextrose 50%)  25 ml  Q30M  PRN


 IV


 Hypoglycemia  4/15/20 01:15


 7/14/20 01:14   


 


 


 Dextrose


  (Dextrose 50%)  50 ml  Q30M  PRN


 IV


 Hypoglycemia  4/15/20 01:15


 7/14/20 01:14   


 


 


 Docusate Sodium


  (Colace)  100 mg  TWICE A  DAY


 ORAL


   4/15/20 09:00


 5/15/20 08:59  4/22/20 08:39


 


 


 Enoxaparin Sodium


  (Lovenox)  30 mg  DAILY


 SUBQ


   4/21/20 09:00


 7/20/20 08:59   


 


 


 Hydralazine HCl


  (Apresoline)  25 mg  Q4H  PRN


 ORAL


 bp over 160 syst  4/14/20 19:45


 7/13/20 19:44   


 


 


 Insulin Aspart


  (NovoLOG)    BEFORE MEALS AND  HS


 SUBQ


   4/15/20 06:30


 7/14/20 06:29  4/21/20 21:58


 


 


 Labetalol HCl


  (Normodyne)  100 mg  DAILY


 ORAL


   4/15/20 09:00


 5/15/20 08:59  4/21/20 10:34


 


 


 Pantoprazole


  (Protonix)  40 mg  Q12HR


 ORAL


   4/14/20 21:00


 5/14/20 20:59  4/22/20 08:39


 


 


 Sevelamer


 Carbonate


  (Renvela)  800 mg  THREE TIMES A  DAY


 ORAL


   4/15/20 09:00


 7/14/20 08:59  4/22/20 08:39


 


 


 Vitamin B Complex/


 Vit C/Folic Acid


  (Nephrovite)  1 tab  DAILY


 ORAL


   4/15/20 09:00


 5/15/20 08:59  4/22/20 08:40


 

















Oh Solomon MD Apr 22, 2020 10:37

## 2020-04-22 NOTE — NUR
CASE MANAGEMENT:REVIEW



4/22/20

SI: COVID 19 PNEUMONIA. ESRD/HD

98.1   89  18  135/85  98% ON RA

H/H-9.5/29.2    BUN+37   CR+8.3



IS: LOVENOX SQ QD

RENVELA PO TID

SENSIPAR PO QD

LABETALOL PO QD

SS INSULIN AC+HS

PROTONIX PO Q12

NEPHROVITE PO QD

SS INSULIN AC+HS

PROTONIX PO Q12

**: TELEMETRY STATUS

DCP: FROM HOME WITH OUTPT DIALYSIS



PLAN:

SCHEDULED FOR DIALYSIS TODAY

[facility] THAT ACCEPTS COVID 19 PATIENTS...SPOKE WITH [facility] AT  
[facility] T:[phone]

## 2020-04-22 NOTE — INFECTIOUS DISEASES PROG NOTE
Assessment/Plan


Assessment/Plan


IMPRESSION:


1. Fever resolved


2. COVID-19 disease.likely mild disease


3. COPD.


4. End-stage renal disease on hemodialysis.


5. Diabetes mellitus.


6. Hypertension.


7. Anemia.





RECOMMENDATION:  


We will observe off antibiotic.


No need for PICC line placement





Subjective


ROS Limited/Unobtainable:  Yes


Constitutional:  Denies: fever


Respiratory:  Reports: no symptoms


Allergies:  


Coded Allergies:  


     No Known Allergies (Unverified , 4/16/13)





Objective


Vital Signs





Last 24 Hour Vital Signs








  Date Time  Temp Pulse Resp B/P (MAP) Pulse Ox O2 Delivery O2 Flow Rate FiO2


 


4/22/20 12:00 97.5 71 18 126/63 (84) 96   


 


4/22/20 09:00      Room Air  


 


4/22/20 09:00  71  137/87    


 


4/22/20 08:00  69      


 


4/22/20 08:00 97.5 66 18 126/70 (88) 96   


 


4/22/20 04:00  61      


 


4/22/20 04:00 98.1 89 18 135/85 (102) 98   


 


4/22/20 00:00  64      


 


4/22/20 00:00 97.9 80 19 138/80 (99) 96   


 


4/21/20 21:00      Room Air  


 


4/21/20 20:00  70      


 


4/21/20 20:00 98.9 80 17 142/67 (92) 96   


 


4/21/20 16:00 99.9 80 20 125/51 (75) 97   


 


4/21/20 16:00  64      








Height (Feet):  5


Height (Inches):  5.00


Weight (Pounds):  156


General Appearance:  no acute distress


HEENT:  mucous membranes moist


Respiratory/Chest:  lungs clear


Cardiovascular:  normal rate


Abdomen:  soft, non tender


Extremities:  no edema


Neurologic/Psychiatric:  alert, oriented x 3, responsive





Laboratory Tests








Test


  4/22/20


07:55


 


White Blood Count


  10.3 K/UL


(4.8-10.8)


 


Red Blood Count


  3.38 M/UL


(4.70-6.10)  L


 


Hemoglobin


  9.5 G/DL


(14.2-18.0)  L


 


Hematocrit


  29.2 %


(42.0-52.0)  L


 


Mean Corpuscular Volume 86 FL (80-99)  


 


Mean Corpuscular Hemoglobin


  28.1 PG


(27.0-31.0)


 


Mean Corpuscular Hemoglobin


Concent 32.5 G/DL


(32.0-36.0)


 


Red Cell Distribution Width


  12.8 %


(11.6-14.8)


 


Platelet Count


  190 K/UL


(150-450)


 


Mean Platelet Volume


  8.7 FL


(6.5-10.1)


 


Neutrophils (%) (Auto)


  76.2 %


(45.0-75.0)  H


 


Lymphocytes (%) (Auto)


  14.3 %


(20.0-45.0)  L


 


Monocytes (%) (Auto)


  8.5 %


(1.0-10.0)


 


Eosinophils (%) (Auto)


  0.5 %


(0.0-3.0)


 


Basophils (%) (Auto)


  0.6 %


(0.0-2.0)











Current Medications








 Medications


  (Trade)  Dose


 Ordered  Sig/Eliazar


 Route


 PRN Reason  Start Time


 Stop Time Status Last Admin


Dose Admin


 


 Acetaminophen


  (Tylenol)  500 mg  Q6H  PRN


 ORAL


 Temp >100.5  4/15/20 01:00


 5/15/20 00:59  4/21/20 10:34


 


 


 Cinacalcet


  (Sensipar)  30 mg  DAILY


 ORAL


   4/15/20 09:00


 7/14/20 08:59  4/22/20 08:39


 


 


 Dextrose


  (Dextrose 50%)  25 ml  Q30M  PRN


 IV


 Hypoglycemia  4/15/20 01:15


 7/14/20 01:14   


 


 


 Dextrose


  (Dextrose 50%)  50 ml  Q30M  PRN


 IV


 Hypoglycemia  4/15/20 01:15


 7/14/20 01:14   


 


 


 Docusate Sodium


  (Colace)  100 mg  TWICE A  DAY


 ORAL


   4/15/20 09:00


 5/15/20 08:59  4/22/20 08:39


 


 


 Enoxaparin Sodium


  (Lovenox)  30 mg  DAILY


 SUBQ


   4/21/20 09:00


 7/20/20 08:59   


 


 


 Hydralazine HCl


  (Apresoline)  25 mg  Q4H  PRN


 ORAL


 bp over 160 syst  4/14/20 19:45


 7/13/20 19:44   


 


 


 Insulin Aspart


  (NovoLOG)    BEFORE MEALS AND  HS


 SUBQ


   4/15/20 06:30


 7/14/20 06:29  4/21/20 21:58


 


 


 Labetalol HCl


  (Normodyne)  100 mg  DAILY


 ORAL


   4/15/20 09:00


 5/15/20 08:59  4/21/20 10:34


 


 


 Pantoprazole


  (Protonix)  40 mg  Q12HR


 ORAL


   4/14/20 21:00


 5/14/20 20:59  4/22/20 08:39


 


 


 Sevelamer


 Carbonate


  (Renvela)  800 mg  THREE TIMES A  DAY


 ORAL


   4/15/20 09:00


 7/14/20 08:59  4/22/20 12:35


 


 


 Vitamin B Complex/


 Vit C/Folic Acid


  (Nephrovite)  1 tab  DAILY


 ORAL


   4/15/20 09:00


 5/15/20 08:59  4/22/20 08:40


 

















Jake Pfeiffer MD Apr 22, 2020 13:53

## 2020-04-22 NOTE — NUR
NURSE NOTES:



Received report from NI Miller. Patient is awake lying semi-nelson's; resting comfortably. 
No signs of acute distress noted; denies pain at this time. AOx3; able to make needs known. 
Primarily Slov[place] speaking. Ambulates with some assistance. Still no IV access, but MD aware 
that patient is hard stick. Bed at lowest position, brakes on, siderails up x3. Siderails 
padded following seizure precautions. Call light within reach. Will continue to monitor.

## 2020-04-22 NOTE — NEPHROLOGY PROGRESS NOTE
Assessment/Plan


Problem List:  


(1) ESRD (end stage renal disease)


(2) Suspected COVID-19 virus infection


(3) Hyperkalemia


(4) COVID-19 virus infection


Assessment





End-stage renal disease on hemodialysis


Has arm fistula for dialysis-Next dialysis Wednesday, April 15


Dyspnea and shortness of breath, history of COPD, oxygen dependent at home


Exposure to COVID 19


Diabetes mellitus


Hypertension


Remote history of seizure


Anemia of kidney disease


Plan





COVID 19 test detected





Hemodialysis  April 20 next April 22


Keep the blood pressure and blood sugar in check


2D echocardiogram pending


Antibiotics and pulmonary support per consultants





Chest x-ray:


Findings: There is a left chest  HERO catheter, tip projected at the level of 

the


superior vena cava. Some atelectatic bands are seen in the left midlung. The 

lungs


and pleural spaces are otherwise clear. The heart size is upper limits of 

normal. A


stent is seen in the left axilla


Impression: Left midlung atelectasis. No acute process otherwise





Subjective


ROS Limited/Unobtainable:  No


Constitutional:  Reports: malaise





Objective


Objective





Last 24 Hour Vital Signs








  Date Time  Temp Pulse Resp B/P (MAP) Pulse Ox O2 Delivery O2 Flow Rate FiO2


 


4/22/20 12:00 97.5 71 18 126/63 (84) 96   


 


4/22/20 09:00      Room Air  


 


4/22/20 09:00  71  137/87    


 


4/22/20 08:00  69      


 


4/22/20 08:00 97.5 66 18 126/70 (88) 96   


 


4/22/20 04:00  61      


 


4/22/20 04:00 98.1 89 18 135/85 (102) 98   


 


4/22/20 00:00  64      


 


4/22/20 00:00 97.9 80 19 138/80 (99) 96   


 


4/21/20 21:00      Room Air  


 


4/21/20 20:00  70      


 


4/21/20 20:00 98.9 80 17 142/67 (92) 96   


 


4/21/20 16:00 99.9 80 20 125/51 (75) 97   


 


4/21/20 16:00  64      

















Intake and Output  


 


 4/21/20 4/22/20





 19:00 07:00


 


Intake Total  1040 ml


 


Balance  1040 ml


 


  


 


Intake Oral  1040 ml


 


# Voids  4


 


# Bowel Movements  1











Current Medications








 Medications


  (Trade)  Dose


 Ordered  Sig/Eliazar


 Route


 PRN Reason  Start Time


 Stop Time Status Last Admin


Dose Admin


 


 Acetaminophen


  (Tylenol)  500 mg  Q6H  PRN


 ORAL


 Temp >100.5  4/15/20 01:00


 5/15/20 00:59  4/21/20 10:34


 


 


 Cinacalcet


  (Sensipar)  30 mg  DAILY


 ORAL


   4/15/20 09:00


 7/14/20 08:59  4/22/20 08:39


 


 


 Dextrose


  (Dextrose 50%)  25 ml  Q30M  PRN


 IV


 Hypoglycemia  4/15/20 01:15


 7/14/20 01:14   


 


 


 Dextrose


  (Dextrose 50%)  50 ml  Q30M  PRN


 IV


 Hypoglycemia  4/15/20 01:15


 7/14/20 01:14   


 


 


 Docusate Sodium


  (Colace)  100 mg  TWICE A  DAY


 ORAL


   4/15/20 09:00


 5/15/20 08:59  4/22/20 08:39


 


 


 Enoxaparin Sodium


  (Lovenox)  30 mg  DAILY


 SUBQ


   4/21/20 09:00


 7/20/20 08:59   


 


 


 Hydralazine HCl


  (Apresoline)  25 mg  Q4H  PRN


 ORAL


 bp over 160 syst  4/14/20 19:45


 7/13/20 19:44   


 


 


 Insulin Aspart


  (NovoLOG)    BEFORE MEALS AND  HS


 SUBQ


   4/15/20 06:30


 7/14/20 06:29  4/21/20 21:58


 


 


 Labetalol HCl


  (Normodyne)  100 mg  DAILY


 ORAL


   4/15/20 09:00


 5/15/20 08:59  4/21/20 10:34


 


 


 Pantoprazole


  (Protonix)  40 mg  Q12HR


 ORAL


   4/14/20 21:00


 5/14/20 20:59  4/22/20 08:39


 


 


 Sevelamer


 Carbonate


  (Renvela)  800 mg  THREE TIMES A  DAY


 ORAL


   4/15/20 09:00


 7/14/20 08:59  4/22/20 12:35


 


 


 Vitamin B Complex/


 Vit C/Folic Acid


  (Nephrovite)  1 tab  DAILY


 ORAL


   4/15/20 09:00


 5/15/20 08:59  4/22/20 08:40


 





Laboratory Tests


4/22/20 07:55: 


White Blood Count 10.3, Red Blood Count 3.38L, Hemoglobin 9.5L, Hematocrit 29.2L

, Mean Corpuscular Volume 86, Mean Corpuscular Hemoglobin 28.1, Mean 

Corpuscular Hemoglobin Concent 32.5, Red Cell Distribution Width 12.8, Platelet 

Count 190, Mean Platelet Volume 8.7, Neutrophils (%) (Auto) 76.2H, Lymphocytes (

%) (Auto) 14.3L, Monocytes (%) (Auto) 8.5, Eosinophils (%) (Auto) 0.5, 

Basophils (%) (Auto) 0.6


Height (Feet):  5


Height (Inches):  5.00


Weight (Pounds):  156


General Appearance:  no apparent distress


Cardiovascular:  normal rate


Respiratory/Chest:  decreased breath sounds


Abdomen:  soft


Objective


no change











Vik Morrison MD Apr 22, 2020 13:09

## 2020-04-22 NOTE — GENERAL PROGRESS NOTE
Assessment/Plan


Problem List:  


(1) Hyperkalemia


ICD Codes:  E87.5 - Hyperkalemia


SNOMED:  69260037


(2) ESRD (end stage renal disease)


ICD Codes:  N18.6 - End stage renal disease


SNOMED:  29637590


(3) Suspected COVID-19 virus infection


ICD Codes:  R68.89 - Other general symptoms and signs


SNOMED:  871037701


Status:  progressing


Assessment/Plan:


covid positive


htn


esrd on hd


lytes normal


poor appetite


respiratory insuff


repeat covid test





Subjective


ROS Limited/Unobtainable:  Yes


Allergies:  


Coded Allergies:  


     No Known Allergies (Unverified , 4/16/13)





Objective





Last 24 Hour Vital Signs








  Date Time  Temp Pulse Resp B/P (MAP) Pulse Ox O2 Delivery O2 Flow Rate FiO2


 


4/22/20 16:00 97.4 83 20 135/80 (98) 95   


 


4/22/20 16:00  71      


 


4/22/20 12:00 97.5 71 18 126/63 (84) 96   


 


4/22/20 12:00  86      


 


4/22/20 09:00      Room Air  


 


4/22/20 09:00  71  137/87    


 


4/22/20 08:00  69      


 


4/22/20 08:00 97.5 66 18 126/70 (88) 96   


 


4/22/20 04:00  61      


 


4/22/20 04:00 98.1 89 18 135/85 (102) 98   


 


4/22/20 00:00  64      


 


4/22/20 00:00 97.9 80 19 138/80 (99) 96   


 


4/21/20 21:00      Room Air  

















Intake and Output  


 


 4/21/20 4/22/20





 19:00 07:00


 


Intake Total  1040 ml


 


Balance  1040 ml


 


  


 


Intake Oral  1040 ml


 


# Voids  4


 


# Bowel Movements  1








Laboratory Tests


4/22/20 07:55: 


White Blood Count 10.3, Red Blood Count 3.38L, Hemoglobin 9.5L, Hematocrit 29.2L

, Mean Corpuscular Volume 86, Mean Corpuscular Hemoglobin 28.1, Mean 

Corpuscular Hemoglobin Concent 32.5, Red Cell Distribution Width 12.8, Platelet 

Count 190, Mean Platelet Volume 8.7, Neutrophils (%) (Auto) 76.2H, Lymphocytes (

%) (Auto) 14.3L, Monocytes (%) (Auto) 8.5, Eosinophils (%) (Auto) 0.5, 

Basophils (%) (Auto) 0.6


Height (Feet):  5


Height (Inches):  5.00


Weight (Pounds):  156


Cardiovascular:  regular rhythm


Respiratory/Chest:  lungs clear











Van Silva MD Apr 22, 2020 20:15

## 2020-04-22 NOTE — NUR
NURSE NOTES:



Attempted to insert IV access, but again, unsuccessful. Called Dr. Silva to inform him that 
patient is a hard stick and that multiple attempts have already been made to re-establish IV 
access, but unsuccessfully. Awaiting callback for any further orders.

## 2020-04-22 NOTE — NUR
Addended by: Ivy Culp on: 12/24/2018 09:23 AM     Modules accepted: Orders NURSE NOTES:

Received report from Radha/RN, Patient is awake, lying semi-nelson, resting comfortably. On 
room air, No distress/SOB noted. Cardiac monitor in place. No IV access at this time, MD 
aware. Bed in low position and locked, Bed alarm armed, side- rails up x3, Call light within 
reach, Encouraged to use call light when needed. Will continue plan of care.

## 2020-04-22 NOTE — NUR
HAND-OFF: 



Report given to NI Miller. Patient is awake lying semi-nelson's; resting comfortably. 
Endorsed to oncoming shift RN regarding patient's hemodialysis procedure later today; 
verbalized understanding. In stable condition.

## 2020-04-23 VITALS — DIASTOLIC BLOOD PRESSURE: 52 MMHG | SYSTOLIC BLOOD PRESSURE: 100 MMHG

## 2020-04-23 VITALS — DIASTOLIC BLOOD PRESSURE: 58 MMHG | SYSTOLIC BLOOD PRESSURE: 122 MMHG

## 2020-04-23 VITALS — SYSTOLIC BLOOD PRESSURE: 103 MMHG | DIASTOLIC BLOOD PRESSURE: 64 MMHG

## 2020-04-23 VITALS — SYSTOLIC BLOOD PRESSURE: 126 MMHG | DIASTOLIC BLOOD PRESSURE: 71 MMHG

## 2020-04-23 VITALS — SYSTOLIC BLOOD PRESSURE: 114 MMHG | DIASTOLIC BLOOD PRESSURE: 51 MMHG

## 2020-04-23 VITALS — SYSTOLIC BLOOD PRESSURE: 101 MMHG | DIASTOLIC BLOOD PRESSURE: 80 MMHG

## 2020-04-23 LAB
ADD MANUAL DIFF: NO
BASOPHILS NFR BLD AUTO: 0.5 % (ref 0–2)
EOSINOPHIL NFR BLD AUTO: 0.6 % (ref 0–3)
ERYTHROCYTE [DISTWIDTH] IN BLOOD BY AUTOMATED COUNT: 13.4 % (ref 11.6–14.8)
HCT VFR BLD CALC: 31.5 % (ref 42–52)
HGB BLD-MCNC: 10.4 G/DL (ref 14.2–18)
LYMPHOCYTES NFR BLD AUTO: 11.3 % (ref 20–45)
MCV RBC AUTO: 88 FL (ref 80–99)
MONOCYTES NFR BLD AUTO: 8.6 % (ref 1–10)
NEUTROPHILS NFR BLD AUTO: 79 % (ref 45–75)
PLATELET # BLD: 220 K/UL (ref 150–450)
RBC # BLD AUTO: 3.59 M/UL (ref 4.7–6.1)
WBC # BLD AUTO: 12.4 K/UL (ref 4.8–10.8)

## 2020-04-23 RX ADMIN — Medication SCH TAB: at 09:58

## 2020-04-23 RX ADMIN — ENOXAPARIN SODIUM SCH MG: 30 INJECTION SUBCUTANEOUS at 10:01

## 2020-04-23 RX ADMIN — DOCUSATE SODIUM SCH MG: 100 CAPSULE, LIQUID FILLED ORAL at 09:00

## 2020-04-23 RX ADMIN — SEVELAMER CARBONATE SCH MG: 800 POWDER, FOR SUSPENSION ORAL at 17:47

## 2020-04-23 RX ADMIN — SEVELAMER CARBONATE SCH MG: 800 POWDER, FOR SUSPENSION ORAL at 09:57

## 2020-04-23 RX ADMIN — INSULIN ASPART SCH UNITS: 100 INJECTION, SOLUTION INTRAVENOUS; SUBCUTANEOUS at 16:30

## 2020-04-23 RX ADMIN — DOCUSATE SODIUM SCH MG: 100 CAPSULE, LIQUID FILLED ORAL at 15:38

## 2020-04-23 RX ADMIN — INSULIN ASPART SCH UNITS: 100 INJECTION, SOLUTION INTRAVENOUS; SUBCUTANEOUS at 11:30

## 2020-04-23 RX ADMIN — SEVELAMER CARBONATE SCH MG: 800 POWDER, FOR SUSPENSION ORAL at 12:09

## 2020-04-23 RX ADMIN — CINACALCET HYDROCHLORIDE SCH MG: 30 TABLET, COATED ORAL at 09:57

## 2020-04-23 RX ADMIN — INSULIN ASPART SCH UNITS: 100 INJECTION, SOLUTION INTRAVENOUS; SUBCUTANEOUS at 21:00

## 2020-04-23 RX ADMIN — INSULIN ASPART SCH UNITS: 100 INJECTION, SOLUTION INTRAVENOUS; SUBCUTANEOUS at 05:46

## 2020-04-23 NOTE — NEPHROLOGY PROGRESS NOTE
Assessment/Plan


Problem List:  


(1) ESRD (end stage renal disease)


(2) Suspected COVID-19 virus infection


(3) Hyperkalemia


(4) COVID-19 virus infection


Assessment





End-stage renal disease on hemodialysis


Has arm fistula for dialysis-Next dialysis Wednesday, April 15


Dyspnea and shortness of breath, history of COPD, oxygen dependent at home


Exposure to COVID 19


Diabetes mellitus


Hypertension


Remote history of seizure


Anemia of kidney disease


Plan





COVID 19 test detected





Hemodialysis  April 22 next April 24


Keep the blood pressure and blood sugar in check


2D echocardiogram pending


Antibiotics and pulmonary support per consultants





Chest x-ray:


Findings: There is a left chest  HERO catheter, tip projected at the level of 

the


superior vena cava. Some atelectatic bands are seen in the left midlung. The 

lungs


and pleural spaces are otherwise clear. The heart size is upper limits of 

normal. A


stent is seen in the left axilla


Impression: Left midlung atelectasis. No acute process otherwise





Subjective


ROS Limited/Unobtainable:  No


Constitutional:  Reports: malaise, weakness





Objective


Objective





Last 24 Hour Vital Signs








  Date Time  Temp Pulse Resp B/P (MAP) Pulse Ox O2 Delivery O2 Flow Rate FiO2


 


4/23/20 09:00      Nasal Cannula 2.0 


 


4/23/20 09:00  103  103/64    


 


4/23/20 08:00  83      


 


4/23/20 08:00 97.9 108 20 103/64 (77) 94   


 


4/23/20 04:00  78      


 


4/23/20 04:00 98.2 66 16 100/52 (68) 93   


 


4/23/20 00:00 97.9 80 16 122/58 (79) 94   


 


4/23/20 00:00  70      


 


4/22/20 21:00      Nasal Cannula 2.0 


 


4/22/20 20:00  93      


 


4/22/20 20:00 97.7 75 18 119/59 (79) 94   


 


4/22/20 16:00 97.4 83 20 135/80 (98) 95   


 


4/22/20 16:00  71      


 


4/22/20 12:00 97.5 71 18 126/63 (84) 96   


 


4/22/20 12:00  86      

















Intake and Output  


 


 4/22/20 4/23/20





 19:00 07:00


 


Intake Total 120 ml 240 ml


 


Output Total  2000 ml


 


Balance 120 ml -1760 ml


 


  


 


Intake Oral 120 ml 240 ml


 


Hemodialysis UF  2000 ml


 


# Bowel Movements 2 








Laboratory Tests


4/23/20 08:40: 


White Blood Count 12.4H, Red Blood Count 3.59L, Hemoglobin 10.4L, Hematocrit 

31.5L, Mean Corpuscular Volume 88, Mean Corpuscular Hemoglobin 29.0, Mean 

Corpuscular Hemoglobin Concent 33.0, Red Cell Distribution Width 13.4, Platelet 

Count 220, Mean Platelet Volume 7.6, Neutrophils (%) (Auto) 79.0H, Lymphocytes (

%) (Auto) 11.3L, Monocytes (%) (Auto) 8.6, Eosinophils (%) (Auto) 0.6, 

Basophils (%) (Auto) 0.5


Height (Feet):  5


Height (Inches):  5.00


Weight (Pounds):  150


Objective


no change











Vik Morrison MD Apr 23, 2020 11:51

## 2020-04-23 NOTE — CARDIOLOGY PROGRESS NOTE
Assessment/Plan


Assessment/Plan


The patient is seen and examined, full consult note is dictated.





Objective





Last 24 Hour Vital Signs








  Date Time  Temp Pulse Resp B/P (MAP) Pulse Ox O2 Delivery O2 Flow Rate FiO2


 


4/23/20 16:00 97.9 87 20 101/80 (87) 94   


 


4/23/20 16:00  80      


 


4/23/20 12:00  104      


 


4/23/20 12:00 97.7 102 20 126/71 (89) 97   


 


4/23/20 09:00      Nasal Cannula 2.0 


 


4/23/20 09:00  103  103/64    


 


4/23/20 08:00  83      


 


4/23/20 08:00 97.9 108 20 103/64 (77) 94   


 


4/23/20 04:00  78      


 


4/23/20 04:00 98.2 66 16 100/52 (68) 93   


 


4/23/20 00:00 97.9 80 16 122/58 (79) 94   


 


4/23/20 00:00  70      


 


4/22/20 21:00      Nasal Cannula 2.0 

















Intake and Output  


 


 4/22/20 4/23/20





 19:00 07:00


 


Intake Total 120 ml 240 ml


 


Output Total  2000 ml


 


Balance 120 ml -1760 ml


 


  


 


Intake Oral 120 ml 240 ml


 


Hemodialysis UF  2000 ml


 


# Bowel Movements 2 











Laboratory Tests








Test


  4/23/20


08:40


 


White Blood Count


  12.4 K/UL


(4.8-10.8)  H


 


Red Blood Count


  3.59 M/UL


(4.70-6.10)  L


 


Hemoglobin


  10.4 G/DL


(14.2-18.0)  L


 


Hematocrit


  31.5 %


(42.0-52.0)  L


 


Mean Corpuscular Volume 88 FL (80-99)  


 


Mean Corpuscular Hemoglobin


  29.0 PG


(27.0-31.0)


 


Mean Corpuscular Hemoglobin


Concent 33.0 G/DL


(32.0-36.0)


 


Red Cell Distribution Width


  13.4 %


(11.6-14.8)


 


Platelet Count


  220 K/UL


(150-450)


 


Mean Platelet Volume


  7.6 FL


(6.5-10.1)


 


Neutrophils (%) (Auto)


  79.0 %


(45.0-75.0)  H


 


Lymphocytes (%) (Auto)


  11.3 %


(20.0-45.0)  L


 


Monocytes (%) (Auto)


  8.6 %


(1.0-10.0)


 


Eosinophils (%) (Auto)


  0.6 %


(0.0-3.0)


 


Basophils (%) (Auto)


  0.5 %


(0.0-2.0)

















Mike Todd MD Apr 23, 2020 20:36

## 2020-04-23 NOTE — NUR
HAND-OFF: 



Report given to NI Macias. Patient is awake lying semi-nelson's; resting comfortably. In 
stable condition.

## 2020-04-23 NOTE — NUR
NURSE NOTES:

Pt is AOx4, Upper sor[place] speaking. Pt is on room air with stable 02 sat. Pt is ambulatory. Pt has 
no IV, dr aware. bed in lowest position, call light within reach, will continue with plan of 
care.

## 2020-04-23 NOTE — DIAGNOSTIC IMAGING REPORT
Indication: Fever, recent history of shortness of breath

 

Technique: One view of the chest

 

Comparison: 4/16/2020

 

Findings: Bilateral interstitial disease diffusely persists, is similar in extent to

the previous exam. There is increased hazy opacity at both lung bases. The heart is

enlarged. A left chest HERO catheter is again demonstrated.

 

Impression: Increased basilar hazy parenchymal infiltrates versus edema on background

of unchanged interstitial disease

 

Cardiomegaly

## 2020-04-23 NOTE — GENERAL PROGRESS NOTE
Assessment/Plan


Problem List:  


(1) Hyperkalemia


ICD Codes:  E87.5 - Hyperkalemia


SNOMED:  83368882


(2) ESRD (end stage renal disease)


ICD Codes:  N18.6 - End stage renal disease


SNOMED:  69915082


(3) Suspected COVID-19 virus infection


ICD Codes:  R68.89 - Other general symptoms and signs


SNOMED:  943332125


Status:  progressing


Assessment/Plan:


covid positive


htn


esrd on hd


abnormal ekg


consulted dr loera





Subjective


ROS Limited/Unobtainable:  Yes


Allergies:  


Coded Allergies:  


     No Known Allergies (Unverified , 4/16/13)





Objective





Last 24 Hour Vital Signs








  Date Time  Temp Pulse Resp B/P (MAP) Pulse Ox O2 Delivery O2 Flow Rate FiO2


 


4/23/20 16:00 97.9 87 20 101/80 (87) 94   


 


4/23/20 16:00  80      


 


4/23/20 12:00  104      


 


4/23/20 12:00 97.7 102 20 126/71 (89) 97   


 


4/23/20 09:00      Nasal Cannula 2.0 


 


4/23/20 09:00  103  103/64    


 


4/23/20 08:00  83      


 


4/23/20 08:00 97.9 108 20 103/64 (77) 94   


 


4/23/20 04:00  78      


 


4/23/20 04:00 98.2 66 16 100/52 (68) 93   


 


4/23/20 00:00 97.9 80 16 122/58 (79) 94   


 


4/23/20 00:00  70      


 


4/22/20 21:00      Nasal Cannula 2.0 

















Intake and Output  


 


 4/22/20 4/23/20





 19:00 07:00


 


Intake Total 120 ml 240 ml


 


Output Total  2000 ml


 


Balance 120 ml -1760 ml


 


  


 


Intake Oral 120 ml 240 ml


 


Hemodialysis UF  2000 ml


 


# Bowel Movements 2 








Laboratory Tests


4/23/20 08:40: 


White Blood Count 12.4H, Red Blood Count 3.59L, Hemoglobin 10.4L, Hematocrit 

31.5L, Mean Corpuscular Volume 88, Mean Corpuscular Hemoglobin 29.0, Mean 

Corpuscular Hemoglobin Concent 33.0, Red Cell Distribution Width 13.4, Platelet 

Count 220, Mean Platelet Volume 7.6, Neutrophils (%) (Auto) 79.0H, Lymphocytes (

%) (Auto) 11.3L, Monocytes (%) (Auto) 8.6, Eosinophils (%) (Auto) 0.6, 

Basophils (%) (Auto) 0.5


Height (Feet):  5


Height (Inches):  5.00


Weight (Pounds):  150











Van Silva MD Apr 23, 2020 20:26

## 2020-04-23 NOTE — PULMONOLOGY PROGRESS NOTE
Assessment/Plan


Assessment/Plan


IMPRESSION:


1. History of COVID-19 exposure. Is + COVID 19 here


2. ESRD, on dialysis.


3. Diabetes mellitus.


4. Dyspnea.





DISCUSSION:  





Consider use of Plaquenil or azithromycin only if patient becomes hypoxic.


Currently SaO2 92-95% on RA alternating with 2L/min O2


Currently, he is doing well.  I would avoid unnecessary antibiotics.


Agree with ongoing dialysis.  I will follow carefully.














  ______________________________________________


  Oh Solomon M.D.





Subjective


ROS Limited/Unobtainable:  Yes


Interval Events:  + COVID 19; no new complaints


Constitutional:  Reports: no symptoms


HEENT:  Repors: no symptoms


Respiratory:  Reports: no symptoms


Cardiovascular:  Reports: no symptoms


Gastrointestinal/Abdominal:  Reports: no symptoms


Allergies:  


Coded Allergies:  


     No Known Allergies (Unverified , 4/16/13)





Objective





Last 24 Hour Vital Signs








  Date Time  Temp Pulse Resp B/P (MAP) Pulse Ox O2 Delivery O2 Flow Rate FiO2


 


4/23/20 12:00  104      


 


4/23/20 12:00 97.7 102 20 126/71 (89) 97   


 


4/23/20 09:00      Nasal Cannula 2.0 


 


4/23/20 09:00  103  103/64    


 


4/23/20 08:00  83      


 


4/23/20 08:00 97.9 108 20 103/64 (77) 94   


 


4/23/20 04:00  78      


 


4/23/20 04:00 98.2 66 16 100/52 (68) 93   


 


4/23/20 00:00 97.9 80 16 122/58 (79) 94   


 


4/23/20 00:00  70      


 


4/22/20 21:00      Nasal Cannula 2.0 


 


4/22/20 20:00  93      


 


4/22/20 20:00 97.7 75 18 119/59 (79) 94   


 


4/22/20 16:00 97.4 83 20 135/80 (98) 95   


 


4/22/20 16:00  71      

















Intake and Output  


 


 4/22/20 4/23/20





 19:00 07:00


 


Intake Total 120 ml 240 ml


 


Output Total  2000 ml


 


Balance 120 ml -1760 ml


 


  


 


Intake Oral 120 ml 240 ml


 


Hemodialysis UF  2000 ml


 


# Bowel Movements 2 








General Appearance:  no acute distress


HEENT:  mucous membranes moist


Respiratory/Chest:  chest wall non-tender, lungs clear


Cardiovascular:  normal peripheral pulses


Abdomen:  soft, non tender


Extremities:  no edema


Neurologic/Psychiatric:  alert, responsive


Laboratory Tests


4/23/20 08:40: 


White Blood Count 12.4H, Red Blood Count 3.59L, Hemoglobin 10.4L, Hematocrit 

31.5L, Mean Corpuscular Volume 88, Mean Corpuscular Hemoglobin 29.0, Mean 

Corpuscular Hemoglobin Concent 33.0, Red Cell Distribution Width 13.4, Platelet 

Count 220, Mean Platelet Volume 7.6, Neutrophils (%) (Auto) 79.0H, Lymphocytes (

%) (Auto) 11.3L, Monocytes (%) (Auto) 8.6, Eosinophils (%) (Auto) 0.6, 

Basophils (%) (Auto) 0.5





Current Medications








 Medications


  (Trade)  Dose


 Ordered  Sig/Eliazar


 Route


 PRN Reason  Start Time


 Stop Time Status Last Admin


Dose Admin


 


 Acetaminophen


  (Tylenol)  500 mg  Q6H  PRN


 ORAL


 Temp >100.5  4/15/20 01:00


 5/15/20 00:59  4/21/20 10:34


 


 


 Cinacalcet


  (Sensipar)  30 mg  DAILY


 ORAL


   4/15/20 09:00


 7/14/20 08:59  4/23/20 09:57


 


 


 Dextrose


  (Dextrose 50%)  25 ml  Q30M  PRN


 IV


 Hypoglycemia  4/15/20 01:15


 7/14/20 01:14   


 


 


 Dextrose


  (Dextrose 50%)  50 ml  Q30M  PRN


 IV


 Hypoglycemia  4/15/20 01:15


 7/14/20 01:14   


 


 


 Docusate Sodium


  (Colace)  100 mg  TWICE A  DAY


 ORAL


   4/15/20 09:00


 5/15/20 08:59  4/22/20 08:39


 


 


 Enoxaparin Sodium


  (Lovenox)  30 mg  DAILY


 SUBQ


   4/21/20 09:00


 7/20/20 08:59  4/23/20 10:01


 


 


 Hydralazine HCl


  (Apresoline)  25 mg  Q4H  PRN


 ORAL


 bp over 160 syst  4/14/20 19:45


 7/13/20 19:44   


 


 


 Insulin Aspart


  (NovoLOG)    BEFORE MEALS AND  HS


 SUBQ


   4/15/20 06:30


 7/14/20 06:29  4/22/20 16:43


 


 


 Labetalol HCl


  (Normodyne)  100 mg  DAILY


 ORAL


   4/15/20 09:00


 5/15/20 08:59  4/21/20 10:34


 


 


 Pantoprazole


  (Protonix)  40 mg  Q12HR


 ORAL


   4/14/20 21:00


 5/14/20 20:59  4/23/20 09:57


 


 


 Sevelamer


 Carbonate


  (Renvela)  800 mg  THREE TIMES A  DAY


 ORAL


   4/15/20 09:00


 7/14/20 08:59  4/23/20 12:09


 


 


 Vitamin B Complex/


 Vit C/Folic Acid


  (Nephrovite)  1 tab  DAILY


 ORAL


   4/15/20 09:00


 5/15/20 08:59  4/23/20 09:58


 

















Oh Solomon MD Apr 23, 2020 13:40

## 2020-04-23 NOTE — NUR
NURSE NOTES:



Received report from [facility] Macias. Patient is a/o x4 resting in bed, lying semi-nelson's. Pt 
is able to make needs known and is primarily a [place]ic speaker. No signs of acute distress 
noted, denies pain at this time.  Ambulates with minimal to no assistance. Pt with no IV 
access, and MD aware.  No need for IV medications per M. Rahban. Bed locked in lowest 
position, brakes on, side rails up x2,Call light within reach. Side rails padded following 
seizure precautions, droplet/contact precautions also active. Will continue to monitor.

## 2020-04-23 NOTE — NUR
NURSE NOTES:

Pt is AOx4, Irish speaking. Pt is on room air with stable 02 sat. Pt is ambulatory. Pt has 
no IV, dr aware. bed in lowest position, call light within reach, will continue with plan of 
care.

## 2020-04-23 NOTE — HEMATOLOGY/ONC PROGRESS NOTE
Assessment/Plan


Assessment/Plan


Asses/Recs


# Elevated D-dimer on admission, with sob, in this case r/o dvt of lower ext


--> less likely pe, first r/o dvt given more likely viral cause possible


--> obtain duplex of lower ext--> NEG FOR pe


--> repeat in future in 3mo


# Anemia of chronic disease due to underlying chronic medical issues, 

multifactorial v Gi bleed 


--> Anemia workup has been ordered, rule out gi bleed 


--> No evidence of hemolysis is noted, peripheral smear has been reviewed.


--> Hgb goal >7. Transfuse prn.


--> Epogen or iron at this time is not particularly indicated


--> Medications have been reviewed


--> hgb trend: 9.8 -->10-->10.8


# Dyspnea


--> appears likely covid related


--> pulm aware


# ESRD (end stage renal disease)


--> hd as per Dr. Morrison


# Seizures.


# Hypertension.


# Diabetes mellitus.


# Dvt ppx lovenox sq





The timing of this note does not necessarily reflect the time of the patient 

was seen.





Greatly appreciate consultation.





Subjective


HEENT:  Denies: no symptoms, eye pain, blurred vision, tearing, double vision, 

ear pain, ear discharge, nose pain, nose congestion, throat pain, throat 

swelling, mouth pain, mouth swelling, other


Cardiovascular:  Denies: no symptoms, chest pain, edema, irregular heart rate, 

lightheadedness, palpitations, syncope, other


Respiratory:  Denies: no symptoms, cough, shortness of breath, SOB with 

excertion, SOB at rest, sputum, wheezing, other


Gastrointestinal/Abdominal:  Denies: no symptoms, abdomen distended, abdominal 

pain, black stools, tarry stools, blood in stool, constipated, diarrhea, 

difficulty swallowing, nausea, poor appetite, poor fluid intake, rectal bleeding

, vomiting, other


Genitourinary:  Denies: no symptoms, burning, discharge, frequency, flank pain, 

hematuria, incontinence, pain, urgency, other


Neurologic/Psychiatric:  Denies: no symptoms, anxiety, depressed, emotional 

problems, headache, numbness, paresthesia, pre-existing deficit, seizure, 

tingling, tremors, weakness, other


Endocrine:  Denies: no symptoms, excessive sweating, flushing, intolerance to 

cold, intolerance to heat, increased hunger, increased thirst, increased urine, 

unexplained weight gain, unexplained weight loss, other


Hematologic/Lymphatic:  Denies: no symptoms, anemia, easy bleeding, easy 

bruising, adenopathy, other


Allergies:  


Coded Allergies:  


     No Known Allergies (Unverified , 4/16/13)


Subjective


4/16 no bleeding , labs noted, hd as needed, renal aware


4/17 on tele, no acute events, nc 2l, sodium 135, hd for today 


4/19 eating breakfast, no overnight events, meds reviewed 


4/20 no events, no bleeding, no fc, no night sweats, hgb 10, hd today


4/21 no major changes, remains lucid, no bleeding, labs noted


4/22 labs noted, difficult stick, labs ordered, no bleeding


4/23 labs have been reviewed, no bleeding, smear is noted, no bleeding





Objective


Objective





Current Medications








 Medications


  (Trade)  Dose


 Ordered  Sig/Eliazar


 Route


 PRN Reason  Start Time


 Stop Time Status Last Admin


Dose Admin


 


 Acetaminophen


  (Tylenol)  500 mg  Q6H  PRN


 ORAL


 Temp >100.5  4/15/20 01:00


 5/15/20 00:59  4/21/20 10:34


 


 


 Cinacalcet


  (Sensipar)  30 mg  DAILY


 ORAL


   4/15/20 09:00


 7/14/20 08:59  4/23/20 09:57


 


 


 Dextrose


  (Dextrose 50%)  25 ml  Q30M  PRN


 IV


 Hypoglycemia  4/15/20 01:15


 7/14/20 01:14   


 


 


 Dextrose


  (Dextrose 50%)  50 ml  Q30M  PRN


 IV


 Hypoglycemia  4/15/20 01:15


 7/14/20 01:14   


 


 


 Docusate Sodium


  (Colace)  100 mg  TWICE A  DAY


 ORAL


   4/15/20 09:00


 5/15/20 08:59  4/22/20 08:39


 


 


 Enoxaparin Sodium


  (Lovenox)  30 mg  DAILY


 SUBQ


   4/21/20 09:00


 7/20/20 08:59  4/23/20 10:01


 


 


 Hydralazine HCl


  (Apresoline)  25 mg  Q4H  PRN


 ORAL


 bp over 160 syst  4/14/20 19:45


 7/13/20 19:44   


 


 


 Insulin Aspart


  (NovoLOG)    BEFORE MEALS AND  HS


 SUBQ


   4/15/20 06:30


 7/14/20 06:29  4/22/20 16:43


 


 


 Labetalol HCl


  (Normodyne)  100 mg  DAILY


 ORAL


   4/15/20 09:00


 5/15/20 08:59  4/21/20 10:34


 


 


 Pantoprazole


  (Protonix)  40 mg  Q12HR


 ORAL


   4/14/20 21:00


 5/14/20 20:59  4/23/20 09:57


 


 


 Sevelamer


 Carbonate


  (Renvela)  800 mg  THREE TIMES A  DAY


 ORAL


   4/15/20 09:00


 7/14/20 08:59  4/23/20 17:47


 


 


 Vitamin B Complex/


 Vit C/Folic Acid


  (Nephrovite)  1 tab  DAILY


 ORAL


   4/15/20 09:00


 5/15/20 08:59  4/23/20 09:58


 











Last 24 Hour Vital Signs








  Date Time  Temp Pulse Resp B/P (MAP) Pulse Ox O2 Delivery O2 Flow Rate FiO2


 


4/23/20 16:00 97.9 87 20 101/80 (87) 94   


 


4/23/20 16:00  80      


 


4/23/20 12:00  104      


 


4/23/20 12:00 97.7 102 20 126/71 (89) 97   


 


4/23/20 09:00      Nasal Cannula 2.0 


 


4/23/20 09:00  103  103/64    


 


4/23/20 08:00  83      


 


4/23/20 08:00 97.9 108 20 103/64 (77) 94   


 


4/23/20 04:00  78      


 


4/23/20 04:00 98.2 66 16 100/52 (68) 93   


 


4/23/20 00:00 97.9 80 16 122/58 (79) 94   


 


4/23/20 00:00  70      


 


4/22/20 21:00      Nasal Cannula 2.0 


 


4/22/20 20:00  93      


 


4/22/20 20:00 97.7 75 18 119/59 (79) 94   


 


4/22/20 16:00 97.4 83 20 135/80 (98) 95   


 


4/22/20 16:00  71      


 


4/22/20 12:00 97.5 71 18 126/63 (84) 96   


 


4/22/20 12:00  86      


 


4/22/20 09:00      Room Air  


 


4/22/20 09:00  71  137/87    


 


4/22/20 08:00  69      


 


4/22/20 08:00 97.5 66 18 126/70 (88) 96   


 


4/22/20 04:00  61      


 


4/22/20 04:00 98.1 89 18 135/85 (102) 98   


 


4/22/20 00:00  64      


 


4/22/20 00:00 97.9 80 19 138/80 (99) 96   


 


4/21/20 21:00      Room Air  

















Intake and Output  


 


 4/22/20 4/23/20





 19:00 07:00


 


Intake Total 120 ml 240 ml


 


Output Total  2000 ml


 


Balance 120 ml -1760 ml


 


  


 


Intake Oral 120 ml 240 ml


 


Hemodialysis UF  2000 ml


 


# Bowel Movements 2 











Labs








Test


  4/21/20


06:00 4/22/20


07:55 4/23/20


08:40


 


White Blood Count


  9.7 K/UL


(4.8-10.8) 10.3 K/UL


(4.8-10.8) 12.4 K/UL


(4.8-10.8)


 


Red Blood Count


  3.78 M/UL


(4.70-6.10) 3.38 M/UL


(4.70-6.10) 3.59 M/UL


(4.70-6.10)


 


Hemoglobin


  10.8 G/DL


(14.2-18.0) 9.5 G/DL


(14.2-18.0) 10.4 G/DL


(14.2-18.0)


 


Hematocrit


  32.9 %


(42.0-52.0) 29.2 %


(42.0-52.0) 31.5 %


(42.0-52.0)


 


Mean Corpuscular Volume 87 FL (80-99)  86 FL (80-99)  88 FL (80-99) 


 


Mean Corpuscular Hemoglobin


  28.5 PG


(27.0-31.0) 28.1 PG


(27.0-31.0) 29.0 PG


(27.0-31.0)


 


Mean Corpuscular Hemoglobin


Concent 32.8 G/DL


(32.0-36.0) 32.5 G/DL


(32.0-36.0) 33.0 G/DL


(32.0-36.0)


 


Red Cell Distribution Width


  13.2 %


(11.6-14.8) 12.8 %


(11.6-14.8) 13.4 %


(11.6-14.8)


 


Platelet Count


  139 K/UL


(150-450) 190 K/UL


(150-450) 220 K/UL


(150-450)


 


Mean Platelet Volume


  9.3 FL


(6.5-10.1) 8.7 FL


(6.5-10.1) 7.6 FL


(6.5-10.1)


 


Neutrophils (%) (Auto)


  77.5 %


(45.0-75.0) 76.2 %


(45.0-75.0) 79.0 %


(45.0-75.0)


 


Lymphocytes (%) (Auto)


  11.9 %


(20.0-45.0) 14.3 %


(20.0-45.0) 11.3 %


(20.0-45.0)


 


Monocytes (%) (Auto)


  9.7 %


(1.0-10.0) 8.5 %


(1.0-10.0) 8.6 %


(1.0-10.0)


 


Eosinophils (%) (Auto)


  0.4 %


(0.0-3.0) 0.5 %


(0.0-3.0) 0.6 %


(0.0-3.0)


 


Basophils (%) (Auto)


  0.4 %


(0.0-2.0) 0.6 %


(0.0-2.0) 0.5 %


(0.0-2.0)


 


Sodium Level


  135 MMOL/L


(136-145) 


  


 


 


Potassium Level


  4.1 MMOL/L


(3.5-5.1) 


  


 


 


Chloride Level


  97 MMOL/L


() 


  


 


 


Carbon Dioxide Level


  27 MMOL/L


(21-32) 


  


 


 


Anion Gap


  11 mmol/L


(5-15) 


  


 


 


Blood Urea Nitrogen


  37 mg/dL


(7-18) 


  


 


 


Creatinine


  8.3 MG/DL


(0.55-1.30) 


  


 


 


Estimat Glomerular Filtration


Rate 6.2 mL/min


(>60) 


  


 


 


Glucose Level


  141 MG/DL


() 


  


 


 


Uric Acid


  4.9 MG/DL


(2.6-7.2) 


  


 


 


Calcium Level


  8.3 MG/DL


(8.5-10.1) 


  


 


 


Phosphorus Level


  2.9 MG/DL


(2.5-4.9) 


  


 


 


Magnesium Level


  2.2 MG/DL


(1.8-2.4) 


  


 


 


Total Bilirubin


  0.7 MG/DL


(0.2-1.0) 


  


 


 


Aspartate Amino Transf


(AST/SGOT) 44 U/L (15-37) 


  


  


 


 


Alanine Aminotransferase


(ALT/SGPT) 37 U/L (12-78) 


  


  


 


 


Alkaline Phosphatase


  96 U/L


() 


  


 


 


C-Reactive Protein,


Quantitative 35.1 mg/dL


(0.00-0.90) 


  


 


 


Pro-B-Type Natriuretic Peptide


  11312 pg/mL


(0-125) 


  


 


 


Total Protein


  6.9 G/DL


(6.4-8.2) 


  


 


 


Albumin


  2.4 G/DL


(3.4-5.0) 


  


 


 


Globulin 4.5 g/dL   


 


Albumin/Globulin Ratio 0.5 (1.0-2.7)   








Height (Feet):  5


Height (Inches):  5.00


Weight (Pounds):  150


Objective





Physical Exam


Vitals: reviewed


General: NAD


HEENT: nc, at


Neck: supple


Chest: clear breath sounds bilaterally


Cardiovascular: RRR, no s3, s4


Neuro: alert and oriented











Mt Staley MD Apr 23, 2020 20:41

## 2020-04-23 NOTE — INFECTIOUS DISEASES PROG NOTE
Assessment/Plan


Assessment/Plan


IMPRESSION:


1. Leukocytosis


2. COVID19 disease.likely mild disease


3. COPD.


4. End-stage renal disease on hemodialysis.


5. Diabetes mellitus.


6. Hypertension.


7. Anemia.





RECOMMENDATION:  


We will observe off antibiotic.


Repeat CXR





Subjective


ROS Limited/Unobtainable:  Yes


Constitutional:  Reports: no symptoms


Respiratory:  Reports: productive cough


Cardiovascular:  Reports: no symptoms


Gastrointestinal/Abdominal:  Reports: no symptoms


Allergies:  


Coded Allergies:  


     No Known Allergies (Unverified , 4/16/13)





Objective


Vital Signs





Last 24 Hour Vital Signs








  Date Time  Temp Pulse Resp B/P (MAP) Pulse Ox O2 Delivery O2 Flow Rate FiO2


 


4/23/20 09:00      Nasal Cannula 2.0 


 


4/23/20 09:00  103  103/64    


 


4/23/20 08:00  83      


 


4/23/20 08:00 97.9 108 20 103/64 (77) 94   


 


4/23/20 04:00  78      


 


4/23/20 04:00 98.2 66 16 100/52 (68) 93   


 


4/23/20 00:00 97.9 80 16 122/58 (79) 94   


 


4/23/20 00:00  70      


 


4/22/20 21:00      Nasal Cannula 2.0 


 


4/22/20 20:00  93      


 


4/22/20 20:00 97.7 75 18 119/59 (79) 94   


 


4/22/20 16:00 97.4 83 20 135/80 (98) 95   


 


4/22/20 16:00  71      








Height (Feet):  5


Height (Inches):  5.00


Weight (Pounds):  150


General Appearance:  no acute distress


HEENT:  mucous membranes moist


Cardiovascular:  tachycardia


Abdomen:  soft, non tender


Extremities:  no edema


Neurologic/Psychiatric:  alert, responsive





Laboratory Tests








Test


  4/23/20


08:40


 


White Blood Count


  12.4 K/UL


(4.8-10.8)  H


 


Red Blood Count


  3.59 M/UL


(4.70-6.10)  L


 


Hemoglobin


  10.4 G/DL


(14.2-18.0)  L


 


Hematocrit


  31.5 %


(42.0-52.0)  L


 


Mean Corpuscular Volume 88 FL (80-99)  


 


Mean Corpuscular Hemoglobin


  29.0 PG


(27.0-31.0)


 


Mean Corpuscular Hemoglobin


Concent 33.0 G/DL


(32.0-36.0)


 


Red Cell Distribution Width


  13.4 %


(11.6-14.8)


 


Platelet Count


  220 K/UL


(150-450)


 


Mean Platelet Volume


  7.6 FL


(6.5-10.1)


 


Neutrophils (%) (Auto)


  79.0 %


(45.0-75.0)  H


 


Lymphocytes (%) (Auto)


  11.3 %


(20.0-45.0)  L


 


Monocytes (%) (Auto)


  8.6 %


(1.0-10.0)


 


Eosinophils (%) (Auto)


  0.6 %


(0.0-3.0)


 


Basophils (%) (Auto)


  0.5 %


(0.0-2.0)











Current Medications








 Medications


  (Trade)  Dose


 Ordered  Sig/Eliazar


 Route


 PRN Reason  Start Time


 Stop Time Status Last Admin


Dose Admin


 


 Acetaminophen


  (Tylenol)  500 mg  Q6H  PRN


 ORAL


 Temp >100.5  4/15/20 01:00


 5/15/20 00:59  4/21/20 10:34


 


 


 Cinacalcet


  (Sensipar)  30 mg  DAILY


 ORAL


   4/15/20 09:00


 7/14/20 08:59  4/23/20 09:57


 


 


 Dextrose


  (Dextrose 50%)  25 ml  Q30M  PRN


 IV


 Hypoglycemia  4/15/20 01:15


 7/14/20 01:14   


 


 


 Dextrose


  (Dextrose 50%)  50 ml  Q30M  PRN


 IV


 Hypoglycemia  4/15/20 01:15


 7/14/20 01:14   


 


 


 Docusate Sodium


  (Colace)  100 mg  TWICE A  DAY


 ORAL


   4/15/20 09:00


 5/15/20 08:59  4/22/20 08:39


 


 


 Enoxaparin Sodium


  (Lovenox)  30 mg  DAILY


 SUBQ


   4/21/20 09:00


 7/20/20 08:59  4/23/20 10:01


 


 


 Hydralazine HCl


  (Apresoline)  25 mg  Q4H  PRN


 ORAL


 bp over 160 syst  4/14/20 19:45


 7/13/20 19:44   


 


 


 Insulin Aspart


  (NovoLOG)    BEFORE MEALS AND  HS


 SUBQ


   4/15/20 06:30


 7/14/20 06:29  4/22/20 16:43


 


 


 Labetalol HCl


  (Normodyne)  100 mg  DAILY


 ORAL


   4/15/20 09:00


 5/15/20 08:59  4/21/20 10:34


 


 


 Pantoprazole


  (Protonix)  40 mg  Q12HR


 ORAL


   4/14/20 21:00


 5/14/20 20:59  4/23/20 09:57


 


 


 Sevelamer


 Carbonate


  (Renvela)  800 mg  THREE TIMES A  DAY


 ORAL


   4/15/20 09:00


 7/14/20 08:59  4/23/20 12:09


 


 


 Vitamin B Complex/


 Vit C/Folic Acid


  (Nephrovite)  1 tab  DAILY


 ORAL


   4/15/20 09:00


 5/15/20 08:59  4/23/20 09:58


 

















Jake Pfeiffer MD Apr 23, 2020 12:23

## 2020-04-23 NOTE — NUR
NURSE NOTES:

left message for DR Silva regarding new onset of peaked T waves. awaiting call back. Pt was 
assessed, no chest pain or symptoms noted. no cardiologist onboard.

## 2020-04-24 VITALS — DIASTOLIC BLOOD PRESSURE: 63 MMHG | SYSTOLIC BLOOD PRESSURE: 112 MMHG

## 2020-04-24 VITALS — DIASTOLIC BLOOD PRESSURE: 59 MMHG | SYSTOLIC BLOOD PRESSURE: 115 MMHG

## 2020-04-24 VITALS — SYSTOLIC BLOOD PRESSURE: 127 MMHG | DIASTOLIC BLOOD PRESSURE: 76 MMHG

## 2020-04-24 VITALS — SYSTOLIC BLOOD PRESSURE: 109 MMHG | DIASTOLIC BLOOD PRESSURE: 78 MMHG

## 2020-04-24 VITALS — SYSTOLIC BLOOD PRESSURE: 132 MMHG | DIASTOLIC BLOOD PRESSURE: 79 MMHG

## 2020-04-24 LAB
ADD MANUAL DIFF: NO
ALBUMIN SERPL-MCNC: 2.5 G/DL (ref 3.4–5)
ALBUMIN/GLOB SERPL: 0.5 {RATIO} (ref 1–2.7)
ALP SERPL-CCNC: 110 U/L (ref 46–116)
ALT SERPL-CCNC: 36 U/L (ref 12–78)
ANION GAP SERPL CALC-SCNC: 12 MMOL/L (ref 5–15)
AST SERPL-CCNC: 25 U/L (ref 15–37)
BASOPHILS NFR BLD AUTO: 0.8 % (ref 0–2)
BILIRUB SERPL-MCNC: 0.6 MG/DL (ref 0.2–1)
BUN SERPL-MCNC: 45 MG/DL (ref 7–18)
CALCIUM SERPL-MCNC: 8.7 MG/DL (ref 8.5–10.1)
CHLORIDE SERPL-SCNC: 95 MMOL/L (ref 98–107)
CO2 SERPL-SCNC: 26 MMOL/L (ref 21–32)
CREAT SERPL-MCNC: 9.3 MG/DL (ref 0.55–1.3)
EOSINOPHIL NFR BLD AUTO: 1.8 % (ref 0–3)
ERYTHROCYTE [DISTWIDTH] IN BLOOD BY AUTOMATED COUNT: 13.1 % (ref 11.6–14.8)
GLOBULIN SER-MCNC: 4.8 G/DL
HCT VFR BLD CALC: 29.2 % (ref 42–52)
HGB BLD-MCNC: 9.6 G/DL (ref 14.2–18)
LYMPHOCYTES NFR BLD AUTO: 12.4 % (ref 20–45)
MCV RBC AUTO: 86 FL (ref 80–99)
MONOCYTES NFR BLD AUTO: 9.8 % (ref 1–10)
NEUTROPHILS NFR BLD AUTO: 75.2 % (ref 45–75)
PHOSPHATE SERPL-MCNC: 3.3 MG/DL (ref 2.5–4.9)
PLATELET # BLD: 254 K/UL (ref 150–450)
POTASSIUM SERPL-SCNC: 4.2 MMOL/L (ref 3.5–5.1)
RBC # BLD AUTO: 3.39 M/UL (ref 4.7–6.1)
SODIUM SERPL-SCNC: 133 MMOL/L (ref 136–145)
WBC # BLD AUTO: 12.9 K/UL (ref 4.8–10.8)

## 2020-04-24 RX ADMIN — DOCUSATE SODIUM SCH MG: 100 CAPSULE, LIQUID FILLED ORAL at 18:59

## 2020-04-24 RX ADMIN — INSULIN ASPART SCH UNITS: 100 INJECTION, SOLUTION INTRAVENOUS; SUBCUTANEOUS at 16:30

## 2020-04-24 RX ADMIN — INSULIN ASPART SCH UNITS: 100 INJECTION, SOLUTION INTRAVENOUS; SUBCUTANEOUS at 21:00

## 2020-04-24 RX ADMIN — SEVELAMER CARBONATE SCH MG: 800 POWDER, FOR SUSPENSION ORAL at 18:59

## 2020-04-24 RX ADMIN — INSULIN ASPART SCH UNITS: 100 INJECTION, SOLUTION INTRAVENOUS; SUBCUTANEOUS at 11:30

## 2020-04-24 RX ADMIN — Medication SCH TAB: at 09:36

## 2020-04-24 RX ADMIN — ENOXAPARIN SODIUM SCH MG: 30 INJECTION SUBCUTANEOUS at 09:36

## 2020-04-24 RX ADMIN — SEVELAMER CARBONATE SCH MG: 800 POWDER, FOR SUSPENSION ORAL at 13:40

## 2020-04-24 RX ADMIN — SEVELAMER CARBONATE SCH MG: 800 POWDER, FOR SUSPENSION ORAL at 09:36

## 2020-04-24 RX ADMIN — INSULIN ASPART SCH UNITS: 100 INJECTION, SOLUTION INTRAVENOUS; SUBCUTANEOUS at 06:30

## 2020-04-24 RX ADMIN — CINACALCET HYDROCHLORIDE SCH MG: 30 TABLET, COATED ORAL at 09:36

## 2020-04-24 RX ADMIN — DOCUSATE SODIUM SCH MG: 100 CAPSULE, LIQUID FILLED ORAL at 09:36

## 2020-04-24 NOTE — NUR
NURSE NOTES:

LEFT MESSAGE FOR DR. SHRUTHI COOK REGARDING ID CLEARANCE FOR DISCHARGE ON MONDAY. AWAITING CALL 
BACK.

## 2020-04-24 NOTE — NUR
NURSE NOTES:

RECEIVED REPORT FROM NI NIEVES. PATIENT AWAKE IN BED, ALERT, OX4, VERBALLY RESPONSIVE AND 
ABLE TO MAKE NEEDS KNOWN- Estonian-SPEAKING ONLY. BREATHING IS EVEN AND UNLABORED ON 2L VIA 
NC, NO S/SX OF DISTRESS. NO COMPLAINTS OF PAIN OF DISCOMFORT AT THIS TIME. BRUIT AND THRILL 
PRESENT LUCAS AV SHUNT. AMBULATORY UNASSISTED, GAIT STEADY. BED LOCKED AND IN LOWEST POSITION. 
CALL LIGHT WITHIN REACH. WILL CONTINUE TO MONITOR FOR ANY CHANGES.

## 2020-04-24 NOTE — NUR
HAND-OFF: 

Report given to NI Polanco. Pt is resting in bed with HOB elevated to high fowlers, with nasal 
cannula on 2 L. Iv intact. Bed locked in lowest position bed alarm on call light within 
reach. Endorsed plan of care.

## 2020-04-24 NOTE — NUR
*-*DISCHARGE PLANNING*-*



PATIENT HAS BEEN REFEREED TO:



[facility] 

 P: [phone]

 F: [phone]

~~~~~~~~~~~~~~~~~~~CLINICALS FAXED~~~~~~~~~~~~~~~~~~~~~~~~~~~~~~~~~

-------------------------------------------------------------------------------

Addendum: 04/24/20 at 1427 by JEREMIAH SÁNCHEZ CM

-------------------------------------------------------------------------------

*-*DISCHARGE PLANNED*-*



PATIENT HAS BEEN ACCEPTED WITH:



[facility] 

 P: [phone]

S/W ELIZA , THEY WILL SERVICE PATIENT UPON DISCHARGE

## 2020-04-24 NOTE — NEPHROLOGY PROGRESS NOTE
Assessment/Plan


Problem List:  


(1) ESRD (end stage renal disease)


(2) Suspected COVID-19 virus infection


(3) Hyperkalemia


(4) COVID-19 virus infection


Assessment





End-stage renal disease on hemodialysis


Has arm fistula for dialysis-Next dialysis Wednesday, April 15


Dyspnea and shortness of breath, history of COPD, oxygen dependent at home


Exposure to COVID 19


Diabetes mellitus


Hypertension


Remote history of seizure


Anemia of kidney disease


Plan





COVID 19 test detected





Hemodialysis  April 22 next April 24


Keep the blood pressure and blood sugar in check


2D echocardiogram pending


Antibiotics and pulmonary support per consultants





Chest x-ray:


Findings: There is a left chest  HERO catheter, tip projected at the level of 

the


superior vena cava. Some atelectatic bands are seen in the left midlung. The 

lungs


and pleural spaces are otherwise clear. The heart size is upper limits of 

normal. A


stent is seen in the left axilla


Impression: Left midlung atelectasis. No acute process otherwise





Subjective


ROS Limited/Unobtainable:  No


Constitutional:  Reports: malaise





Objective


Objective





Last 24 Hour Vital Signs








  Date Time  Temp Pulse Resp B/P (MAP) Pulse Ox O2 Delivery O2 Flow Rate FiO2


 


4/24/20 09:36  74  109/78    


 


4/24/20 08:00 98.8 78 18 112/63 (79) 95   


 


4/24/20 07:48      Nasal Cannula 2.0 


 


4/24/20 07:46  85      


 


4/24/20 04:00  74      


 


4/24/20 04:00 98.2 74 20 109/78 (88) 94   


 


4/24/20 00:00  75      


 


4/24/20 00:00  75      


 


4/23/20 21:00      Nasal Cannula 2.0 


 


4/23/20 20:00  76      


 


4/23/20 20:00 98.6 76 18 114/51 (72) 92   


 


4/23/20 16:00 97.9 87 20 101/80 (87) 94   


 


4/23/20 16:00  80      


 


4/23/20 12:00  104      


 


4/23/20 12:00 97.7 102 20 126/71 (89) 97   

















Intake and Output  


 


 4/23/20 4/24/20





 19:00 07:00


 


Intake Total 240 ml 120 ml


 


Balance 240 ml 120 ml


 


  


 


Intake Oral 240 ml 120 ml


 


# Voids 2 








Laboratory Tests


4/24/20 06:50: 


White Blood Count 12.9H, Red Blood Count 3.39L, Hemoglobin 9.6L, Hematocrit 

29.2L, Mean Corpuscular Volume 86, Mean Corpuscular Hemoglobin 28.4, Mean 

Corpuscular Hemoglobin Concent 33.0, Red Cell Distribution Width 13.1, Platelet 

Count 254, Mean Platelet Volume 7.4, Neutrophils (%) (Auto) 75.2H, Lymphocytes (

%) (Auto) 12.4L, Monocytes (%) (Auto) 9.8, Eosinophils (%) (Auto) 1.8, 

Basophils (%) (Auto) 0.8, Sodium Level 133L, Potassium Level 4.2, Chloride 

Level 95L, Carbon Dioxide Level 26, Anion Gap 12, Blood Urea Nitrogen 45H, 

Creatinine 9.3H, Estimat Glomerular Filtration Rate 5.5, Glucose Level 99, 

Calcium Level 8.7, Phosphorus Level 3.3, Total Bilirubin 0.6, Aspartate Amino 

Transf (AST/SGOT) 25, Alanine Aminotransferase (ALT/SGPT) 36, Alkaline 

Phosphatase 110, C-Reactive Protein, Quantitative 4.0H, Total Protein 7.3, 

Albumin 2.5L, Globulin 4.8, Albumin/Globulin Ratio 0.5L


Height (Feet):  5


Height (Inches):  5.00


Weight (Pounds):  150


General Appearance:  no apparent distress


Respiratory/Chest:  decreased breath sounds


Abdomen:  soft


Objective


no change











Vik Morrison MD Apr 24, 2020 11:50

## 2020-04-24 NOTE — HEMATOLOGY/ONC PROGRESS NOTE
Assessment/Plan


Assessment/Plan


Asses/Recs


# Elevated D-dimer on admission, with sob, in this case r/o dvt of lower ext


--> less likely pe, first r/o dvt given more likely viral cause possible


--> obtain duplex of lower ext--> NEG FOR pe


--> repeat in future in 3mo


# Anemia of chronic disease due to underlying chronic medical issues, 

multifactorial v Gi bleed 


--> Anemia workup has been ordered, rule out gi bleed 


--> No evidence of hemolysis is noted, peripheral smear has been reviewed.


--> Hgb goal >7. Transfuse prn.


--> Epogen or iron at this time is not particularly indicated


--> Medications have been reviewed


--> hgb trend: 9.8 -->10-->10.8-->10.3


# Dyspnea


--> appears likely covid related


--> pulm aware


# ESRD (end stage renal disease)


--> hd as per Dr. Morrison


# Seizures.


# Hypertension.


# Diabetes mellitus.


# Dvt ppx lovenox sq





The timing of this note does not necessarily reflect the time of the patient 

was seen.





Greatly appreciate consultation.





Subjective


Constitutional:  Denies: no symptoms, chills, fever, malaise, weakness, other


HEENT:  Denies: no symptoms, eye pain, blurred vision, tearing, double vision, 

ear pain, ear discharge, nose pain, nose congestion, throat pain, throat 

swelling, mouth pain, mouth swelling, other


Cardiovascular:  Denies: no symptoms, chest pain, edema, irregular heart rate, 

lightheadedness, palpitations, syncope, other


Respiratory:  Denies: no symptoms, cough, shortness of breath, SOB with 

excertion, SOB at rest, sputum, wheezing, other


Gastrointestinal/Abdominal:  Denies: no symptoms, abdomen distended, abdominal 

pain, black stools, tarry stools, blood in stool, constipated, diarrhea, 

difficulty swallowing, nausea, poor appetite, poor fluid intake, rectal bleeding

, vomiting, other


Neurologic/Psychiatric:  Denies: no symptoms, anxiety, depressed, emotional 

problems, headache, numbness, paresthesia, pre-existing deficit, seizure, 

tingling, tremors, weakness, other


Endocrine:  Denies: no symptoms, excessive sweating, flushing, intolerance to 

cold, intolerance to heat, increased hunger, increased thirst, increased urine, 

unexplained weight gain, unexplained weight loss, other


Hematologic/Lymphatic:  Denies: no symptoms, anemia, easy bleeding, easy 

bruising, adenopathy, other


Allergies:  


Coded Allergies:  


     No Known Allergies (Unverified , 4/16/13)


Subjective


4/16 no bleeding , labs noted, hd as needed, renal aware


4/17 on tele, no acute events, nc 2l, sodium 135, hd for today 


4/19 eating breakfast, no overnight events, meds reviewed 


4/20 no events, no bleeding, no fc, no night sweats, hgb 10, hd today


4/21 no major changes, remains lucid, no bleeding, labs noted


4/22 labs noted, difficult stick, labs ordered, no bleeding


4/23 labs have been reviewed, no bleeding, smear is noted, no bleeding


4/24 no bleeding, no events, labs reviewed, labs noted, Greek speaking





Objective


Objective





Current Medications








 Medications


  (Trade)  Dose


 Ordered  Sig/Eliazar


 Route


 PRN Reason  Start Time


 Stop Time Status Last Admin


Dose Admin


 


 Acetaminophen


  (Tylenol)  500 mg  Q6H  PRN


 ORAL


 Temp >100.5  4/15/20 01:00


 5/15/20 00:59  4/21/20 10:34


 


 


 Cinacalcet


  (Sensipar)  30 mg  DAILY


 ORAL


   4/15/20 09:00


 7/14/20 08:59  4/23/20 09:57


 


 


 Dextrose


  (Dextrose 50%)  25 ml  Q30M  PRN


 IV


 Hypoglycemia  4/15/20 01:15


 7/14/20 01:14   


 


 


 Dextrose


  (Dextrose 50%)  50 ml  Q30M  PRN


 IV


 Hypoglycemia  4/15/20 01:15


 7/14/20 01:14   


 


 


 Docusate Sodium


  (Colace)  100 mg  TWICE A  DAY


 ORAL


   4/15/20 09:00


 5/15/20 08:59  4/22/20 08:39


 


 


 Enoxaparin Sodium


  (Lovenox)  30 mg  DAILY


 SUBQ


   4/21/20 09:00


 7/20/20 08:59  4/23/20 10:01


 


 


 Hydralazine HCl


  (Apresoline)  25 mg  Q4H  PRN


 ORAL


 bp over 160 syst  4/14/20 19:45


 7/13/20 19:44   


 


 


 Insulin Aspart


  (NovoLOG)    BEFORE MEALS AND  HS


 SUBQ


   4/15/20 06:30


 7/14/20 06:29  4/22/20 16:43


 


 


 Labetalol HCl


  (Normodyne)  100 mg  DAILY


 ORAL


   4/15/20 09:00


 5/15/20 08:59  4/21/20 10:34


 


 


 Pantoprazole


  (Protonix)  40 mg  Q12HR


 ORAL


   4/14/20 21:00


 5/14/20 20:59  4/23/20 21:17


 


 


 Sevelamer


 Carbonate


  (Renvela)  800 mg  THREE TIMES A  DAY


 ORAL


   4/15/20 09:00


 7/14/20 08:59  4/23/20 17:47


 


 


 Vitamin B Complex/


 Vit C/Folic Acid


  (Nephrovite)  1 tab  DAILY


 ORAL


   4/15/20 09:00


 5/15/20 08:59  4/23/20 09:58


 











Last 24 Hour Vital Signs








  Date Time  Temp Pulse Resp B/P (MAP) Pulse Ox O2 Delivery O2 Flow Rate FiO2


 


4/24/20 04:00  74      


 


4/24/20 04:00 98.2 74 20 109/78 (88) 94   


 


4/24/20 00:00  75      


 


4/24/20 00:00  75      


 


4/23/20 21:00      Nasal Cannula 2.0 


 


4/23/20 20:00  76      


 


4/23/20 20:00 98.6 76 18 114/51 (72) 92   


 


4/23/20 16:00 97.9 87 20 101/80 (87) 94   


 


4/23/20 16:00  80      


 


4/23/20 12:00  104      


 


4/23/20 12:00 97.7 102 20 126/71 (89) 97   


 


4/23/20 09:00      Nasal Cannula 2.0 


 


4/23/20 09:00  103  103/64    


 


4/23/20 08:00  83      


 


4/23/20 08:00 97.9 108 20 103/64 (77) 94   


 


4/23/20 04:00  78      


 


4/23/20 04:00 98.2 66 16 100/52 (68) 93   


 


4/23/20 00:00 97.9 80 16 122/58 (79) 94   


 


4/23/20 00:00  70      


 


4/22/20 21:00      Nasal Cannula 2.0 


 


4/22/20 20:00  93      


 


4/22/20 20:00 97.7 75 18 119/59 (79) 94   


 


4/22/20 16:00 97.4 83 20 135/80 (98) 95   


 


4/22/20 16:00  71      


 


4/22/20 12:00 97.5 71 18 126/63 (84) 96   


 


4/22/20 12:00  86      


 


4/22/20 09:00      Room Air  


 


4/22/20 09:00  71  137/87    


 


4/22/20 08:00  69      


 


4/22/20 08:00 97.5 66 18 126/70 (88) 96   

















Intake and Output  


 


 4/23/20 4/24/20





 19:00 07:00


 


Intake Total 240 ml 


 


Balance 240 ml 


 


  


 


Intake Oral 240 ml 


 


# Voids 2 











Labs








Test


  4/22/20


07:55 4/23/20


08:40


 


White Blood Count


  10.3 K/UL


(4.8-10.8) 12.4 K/UL


(4.8-10.8)


 


Red Blood Count


  3.38 M/UL


(4.70-6.10) 3.59 M/UL


(4.70-6.10)


 


Hemoglobin


  9.5 G/DL


(14.2-18.0) 10.4 G/DL


(14.2-18.0)


 


Hematocrit


  29.2 %


(42.0-52.0) 31.5 %


(42.0-52.0)


 


Mean Corpuscular Volume 86 FL (80-99)  88 FL (80-99) 


 


Mean Corpuscular Hemoglobin


  28.1 PG


(27.0-31.0) 29.0 PG


(27.0-31.0)


 


Mean Corpuscular Hemoglobin


Concent 32.5 G/DL


(32.0-36.0) 33.0 G/DL


(32.0-36.0)


 


Red Cell Distribution Width


  12.8 %


(11.6-14.8) 13.4 %


(11.6-14.8)


 


Platelet Count


  190 K/UL


(150-450) 220 K/UL


(150-450)


 


Mean Platelet Volume


  8.7 FL


(6.5-10.1) 7.6 FL


(6.5-10.1)


 


Neutrophils (%) (Auto)


  76.2 %


(45.0-75.0) 79.0 %


(45.0-75.0)


 


Lymphocytes (%) (Auto)


  14.3 %


(20.0-45.0) 11.3 %


(20.0-45.0)


 


Monocytes (%) (Auto)


  8.5 %


(1.0-10.0) 8.6 %


(1.0-10.0)


 


Eosinophils (%) (Auto)


  0.5 %


(0.0-3.0) 0.6 %


(0.0-3.0)


 


Basophils (%) (Auto)


  0.6 %


(0.0-2.0) 0.5 %


(0.0-2.0)








Height (Feet):  5


Height (Inches):  5.00


Weight (Pounds):  150


Objective





Physical Exam


Vitals: reviewed


General: NAD


HEENT: nc, at


Neck: supple


Chest: clear breath sounds bilaterally


Cardiovascular: RRR, no s3, s4


Neuro: alert and oriented











Mt Staley MD Apr 24, 2020 06:07

## 2020-04-24 NOTE — INFECTIOUS DISEASES PROG NOTE
Assessment/Plan


Assessment/Plan


IMPRESSION:


1. Leukocytosis


2. COVID19 disease.likely mild disease


3. COPD.


4. End-stage renal disease on hemodialysis.


5. Diabetes mellitus.


6. Hypertension.


7. Anemia.





RECOMMENDATION:  


Start on Levaquin


Will f/u COVID19 test





Subjective


ROS Limited/Unobtainable:  Yes


Constitutional:  Denies: fever


Allergies:  


Coded Allergies:  


     No Known Allergies (Unverified , 4/16/13)





Objective


Vital Signs





Last 24 Hour Vital Signs








  Date Time  Temp Pulse Resp B/P (MAP) Pulse Ox O2 Delivery O2 Flow Rate FiO2


 


4/24/20 09:36  74  109/78    


 


4/24/20 08:00 98.8 78 18 112/63 (79) 95   


 


4/24/20 07:48      Nasal Cannula 2.0 


 


4/24/20 04:00  74      


 


4/24/20 04:00 98.2 74 20 109/78 (88) 94   


 


4/24/20 00:00  75      


 


4/24/20 00:00  75      


 


4/23/20 21:00      Nasal Cannula 2.0 


 


4/23/20 20:00  76      


 


4/23/20 20:00 98.6 76 18 114/51 (72) 92   


 


4/23/20 16:00 97.9 87 20 101/80 (87) 94   


 


4/23/20 16:00  80      


 


4/23/20 12:00  104      


 


4/23/20 12:00 97.7 102 20 126/71 (89) 97   








Height (Feet):  5


Height (Inches):  5.00


Weight (Pounds):  150


General Appearance:  no acute distress


HEENT:  mucous membranes moist


Respiratory/Chest:  other - oxygen by nasal cannula


Cardiovascular:  normal rate


Abdomen:  soft, non tender


Extremities:  no edema


Neurologic/Psychiatric:  other - sleeping





Laboratory Tests








Test


  4/24/20


06:50


 


White Blood Count


  12.9 K/UL


(4.8-10.8)  H


 


Red Blood Count


  3.39 M/UL


(4.70-6.10)  L


 


Hemoglobin


  9.6 G/DL


(14.2-18.0)  L


 


Hematocrit


  29.2 %


(42.0-52.0)  L


 


Mean Corpuscular Volume 86 FL (80-99)  


 


Mean Corpuscular Hemoglobin


  28.4 PG


(27.0-31.0)


 


Mean Corpuscular Hemoglobin


Concent 33.0 G/DL


(32.0-36.0)


 


Red Cell Distribution Width


  13.1 %


(11.6-14.8)


 


Platelet Count


  254 K/UL


(150-450)


 


Mean Platelet Volume


  7.4 FL


(6.5-10.1)


 


Neutrophils (%) (Auto)


  75.2 %


(45.0-75.0)  H


 


Lymphocytes (%) (Auto)


  12.4 %


(20.0-45.0)  L


 


Monocytes (%) (Auto)


  9.8 %


(1.0-10.0)


 


Eosinophils (%) (Auto)


  1.8 %


(0.0-3.0)


 


Basophils (%) (Auto)


  0.8 %


(0.0-2.0)


 


Sodium Level


  133 MMOL/L


(136-145)  L


 


Potassium Level


  4.2 MMOL/L


(3.5-5.1)


 


Chloride Level


  95 MMOL/L


()  L


 


Carbon Dioxide Level


  26 MMOL/L


(21-32)


 


Anion Gap


  12 mmol/L


(5-15)


 


Blood Urea Nitrogen


  45 mg/dL


(7-18)  H


 


Creatinine


  9.3 MG/DL


(0.55-1.30)  H


 


Estimat Glomerular Filtration


Rate 5.5 mL/min


(>60)


 


Glucose Level


  99 MG/DL


()


 


Calcium Level


  8.7 MG/DL


(8.5-10.1)


 


Phosphorus Level


  3.3 MG/DL


(2.5-4.9)


 


Total Bilirubin


  0.6 MG/DL


(0.2-1.0)


 


Aspartate Amino Transf


(AST/SGOT) 25 U/L (15-37)


 


 


Alanine Aminotransferase


(ALT/SGPT) 36 U/L (12-78)


 


 


Alkaline Phosphatase


  110 U/L


()


 


C-Reactive Protein,


Quantitative 4.0 mg/dL


(0.00-0.90)  H


 


Total Protein


  7.3 G/DL


(6.4-8.2)


 


Albumin


  2.5 G/DL


(3.4-5.0)  L


 


Globulin 4.8 g/dL  


 


Albumin/Globulin Ratio


  0.5 (1.0-2.7)


L











Current Medications








 Medications


  (Trade)  Dose


 Ordered  Sig/Eliazar


 Route


 PRN Reason  Start Time


 Stop Time Status Last Admin


Dose Admin


 


 Acetaminophen


  (Tylenol)  500 mg  Q6H  PRN


 ORAL


 Temp >100.5  4/15/20 01:00


 5/15/20 00:59  4/21/20 10:34


 


 


 Cinacalcet


  (Sensipar)  30 mg  DAILY


 ORAL


   4/15/20 09:00


 7/14/20 08:59  4/24/20 09:36


 


 


 Dextrose


  (Dextrose 50%)  25 ml  Q30M  PRN


 IV


 Hypoglycemia  4/15/20 01:15


 7/14/20 01:14   


 


 


 Dextrose


  (Dextrose 50%)  50 ml  Q30M  PRN


 IV


 Hypoglycemia  4/15/20 01:15


 7/14/20 01:14   


 


 


 Docusate Sodium


  (Colace)  100 mg  TWICE A  DAY


 ORAL


   4/15/20 09:00


 5/15/20 08:59  4/24/20 09:36


 


 


 Enoxaparin Sodium


  (Lovenox)  30 mg  DAILY


 SUBQ


   4/21/20 09:00


 7/20/20 08:59  4/23/20 10:01


 


 


 Hydralazine HCl


  (Apresoline)  25 mg  Q4H  PRN


 ORAL


 bp over 160 syst  4/14/20 19:45


 7/13/20 19:44   


 


 


 Insulin Aspart


  (NovoLOG)    BEFORE MEALS AND  HS


 SUBQ


   4/15/20 06:30


 7/14/20 06:29  4/22/20 16:43


 


 


 Labetalol HCl


  (Normodyne)  100 mg  DAILY


 ORAL


   4/15/20 09:00


 5/15/20 08:59  4/21/20 10:34


 


 


 Pantoprazole


  (Protonix)  40 mg  Q12HR


 ORAL


   4/14/20 21:00


 5/14/20 20:59  4/24/20 09:36


 


 


 Sevelamer


 Carbonate


  (Renvela)  800 mg  THREE TIMES A  DAY


 ORAL


   4/15/20 09:00


 7/14/20 08:59  4/24/20 09:36


 


 


 Vitamin B Complex/


 Vit C/Folic Acid


  (Nephrovite)  1 tab  DAILY


 ORAL


   4/15/20 09:00


 5/15/20 08:59  4/24/20 09:36


 

















Jake Pfeiffer MD Apr 24, 2020 11:25

## 2020-04-24 NOTE — PULMONOLOGY PROGRESS NOTE
Assessment/Plan


Assessment/Plan


IMPRESSION:


1. History of COVID-19 exposure. Is + COVID 19 here


2. ESRD, on dialysis.


3. Diabetes mellitus.


4. Dyspnea.





DISCUSSION:  





Consider use of Plaquenil or azithromycin only if patient becomes hypoxic.


Currently SaO2 92-95% on RA alternating with 2L/min O2


Currently, he is doing well.  I would avoid unnecessary antibiotics.


Agree with ongoing dialysis.  I will follow carefully.














  ______________________________________________


  Oh Solomon M.D.





Subjective


ROS Limited/Unobtainable:  Yes


Interval Events:  + COVID 19; no new complaints


Constitutional:  Reports: no symptoms


HEENT:  Repors: no symptoms


Respiratory:  Reports: no symptoms


Cardiovascular:  Reports: no symptoms


Gastrointestinal/Abdominal:  Reports: no symptoms


Allergies:  


Coded Allergies:  


     No Known Allergies (Unverified , 4/16/13)





Objective





Last 24 Hour Vital Signs








  Date Time  Temp Pulse Resp B/P (MAP) Pulse Ox O2 Delivery O2 Flow Rate FiO2


 


4/24/20 09:36  74  109/78    


 


4/24/20 08:00 98.8 78 18 112/63 (79) 95   


 


4/24/20 07:48      Nasal Cannula 2.0 


 


4/24/20 04:00  74      


 


4/24/20 04:00 98.2 74 20 109/78 (88) 94   


 


4/24/20 00:00  75      


 


4/24/20 00:00  75      


 


4/23/20 21:00      Nasal Cannula 2.0 


 


4/23/20 20:00  76      


 


4/23/20 20:00 98.6 76 18 114/51 (72) 92   


 


4/23/20 16:00 97.9 87 20 101/80 (87) 94   


 


4/23/20 16:00  80      


 


4/23/20 12:00  104      


 


4/23/20 12:00 97.7 102 20 126/71 (89) 97   

















Intake and Output  


 


 4/23/20 4/24/20





 19:00 07:00


 


Intake Total 240 ml 120 ml


 


Balance 240 ml 120 ml


 


  


 


Intake Oral 240 ml 120 ml


 


# Voids 2 








General Appearance:  no acute distress


HEENT:  mucous membranes moist


Respiratory/Chest:  chest wall non-tender, lungs clear


Cardiovascular:  normal peripheral pulses


Abdomen:  soft, non tender


Extremities:  no edema


Neurologic/Psychiatric:  alert, responsive


Laboratory Tests


4/24/20 06:50: 


White Blood Count 12.9H, Red Blood Count 3.39L, Hemoglobin 9.6L, Hematocrit 

29.2L, Mean Corpuscular Volume 86, Mean Corpuscular Hemoglobin 28.4, Mean 

Corpuscular Hemoglobin Concent 33.0, Red Cell Distribution Width 13.1, Platelet 

Count 254, Mean Platelet Volume 7.4, Neutrophils (%) (Auto) 75.2H, Lymphocytes (

%) (Auto) 12.4L, Monocytes (%) (Auto) 9.8, Eosinophils (%) (Auto) 1.8, 

Basophils (%) (Auto) 0.8, Sodium Level 133L, Potassium Level 4.2, Chloride 

Level 95L, Carbon Dioxide Level 26, Anion Gap 12, Blood Urea Nitrogen 45H, 

Creatinine 9.3H, Estimat Glomerular Filtration Rate 5.5, Glucose Level 99, 

Calcium Level 8.7, Phosphorus Level 3.3, Total Bilirubin 0.6, Aspartate Amino 

Transf (AST/SGOT) 25, Alanine Aminotransferase (ALT/SGPT) 36, Alkaline 

Phosphatase 110, C-Reactive Protein, Quantitative 4.0H, Total Protein 7.3, 

Albumin 2.5L, Globulin 4.8, Albumin/Globulin Ratio 0.5L





Current Medications








 Medications


  (Trade)  Dose


 Ordered  Sig/Eliazar


 Route


 PRN Reason  Start Time


 Stop Time Status Last Admin


Dose Admin


 


 Acetaminophen


  (Tylenol)  500 mg  Q6H  PRN


 ORAL


 Temp >100.5  4/15/20 01:00


 5/15/20 00:59  4/21/20 10:34


 


 


 Cinacalcet


  (Sensipar)  30 mg  DAILY


 ORAL


   4/15/20 09:00


 7/14/20 08:59  4/24/20 09:36


 


 


 Dextrose


  (Dextrose 50%)  25 ml  Q30M  PRN


 IV


 Hypoglycemia  4/15/20 01:15


 7/14/20 01:14   


 


 


 Dextrose


  (Dextrose 50%)  50 ml  Q30M  PRN


 IV


 Hypoglycemia  4/15/20 01:15


 7/14/20 01:14   


 


 


 Docusate Sodium


  (Colace)  100 mg  TWICE A  DAY


 ORAL


   4/15/20 09:00


 5/15/20 08:59  4/24/20 09:36


 


 


 Enoxaparin Sodium


  (Lovenox)  30 mg  DAILY


 SUBQ


   4/21/20 09:00


 7/20/20 08:59  4/23/20 10:01


 


 


 Hydralazine HCl


  (Apresoline)  25 mg  Q4H  PRN


 ORAL


 bp over 160 syst  4/14/20 19:45


 7/13/20 19:44   


 


 


 Insulin Aspart


  (NovoLOG)    BEFORE MEALS AND  HS


 SUBQ


   4/15/20 06:30


 7/14/20 06:29  4/22/20 16:43


 


 


 Labetalol HCl


  (Normodyne)  100 mg  DAILY


 ORAL


   4/15/20 09:00


 5/15/20 08:59  4/21/20 10:34


 


 


 Pantoprazole


  (Protonix)  40 mg  Q12HR


 ORAL


   4/14/20 21:00


 5/14/20 20:59  4/24/20 09:36


 


 


 Sevelamer


 Carbonate


  (Renvela)  800 mg  THREE TIMES A  DAY


 ORAL


   4/15/20 09:00


 7/14/20 08:59  4/24/20 09:36


 


 


 Vitamin B Complex/


 Vit C/Folic Acid


  (Nephrovite)  1 tab  DAILY


 ORAL


   4/15/20 09:00


 5/15/20 08:59  4/24/20 09:36


 

















Oh Solomon MD Apr 24, 2020 11:11

## 2020-04-24 NOTE — NUR
NURSE NOTES:

ZACK AT 2100 182. NO INSULIN GIVEN AS PER PRESCRIBED SLIDING SCALE BECAUSE PATIENT HAS NOT 
BEEN EATING MUCH TODAY. FROM PATIENT'S SELF-REPORT, DOES NOT REALLY HAVE AN APPETITE TODAY- 
PER PATIENT, THIS SOMETIMES HAPPEN TO HIM. EXPLAINED TO PATIENT THAT SINCE HE IS NOT EATING, 
I WILL NOT BE GIVING HIM INSULIN TO PREVENT HYPOGLYCEMIC EPISODES. PATIENT VERBALIZED 
UNDERSTANDING. WILL MONITOR PATIENT FOR S/S OF WORSENING HYPERGLYCEMIA.

## 2020-04-24 NOTE — CONSULTATION
DATE OF CONSULTATION:  2020

CARDIOLOGY CONSULTATION



CONSULTING PHYSICIAN:  Mike Todd MD.



REFERRING PHYSICIAN:  Van Silva MD.



REASON FOR CONSULTATION:  Management of abnormal EKG.



HISTORY OF PRESENT ILLNESS:  The patient is a very pleasant 84-year-old

gentleman who presented to the hospital with shortness of breath, which

had been going on for just about a day.  The patient had no complaints of

cough or fever.  Apparently, his son was recently admitted to intensive

care unit for COVID-19 infection.



At the time of arrival to this facility, blood pressure was 161/68 mmHg

and heart rate was 68.  Initial chest x-ray showed left mid lung

atelectasis, otherwise no acute cardiopulmonary disease with presence of a

left chest dialysis catheter.  The patient had another chest x-ray on

2020, which showed increased basilar hazy parenchymal infiltrate

versus edema, suggestive of possible bilateral pulmonary edema versus

bilateral pneumonia.  His 12-lead electrocardiogram at the time of arrival

to this facility was sinus rhythm, rate of 71 with bifascicular block

including right bundle-branch block and left anterior hemiblock; no acute

ischemic changes.  The patient developed trigeminy ventricular premature

depolarizations on the rhythm strip.  Cardiology consultation was made at

the request of Dr. Silva for evaluation of frequent PVC including

trigeminy PVCs.  The patient was ruled out for acute myocardial infarction

by cardiac enzymes.  His brain-natriuretic peptide was also elevated at

23,156 on admission and remained to be elevated throughout his stay so

far.



PAST MEDICAL HISTORY:  As mentioned above includin. End-stage renal disease.

2. Diabetes mellitus.

3. Seizure disorder.

4. COPD.



MEDICATIONS:  List of medications, Keflex 250 mg q.6 hours x 5 days,

Sensipar 30 mg p.o. daily, labetalol 100 mg p.o. daily, Renvela 800 mg

three times a day, and Nephro-Makenzie one tablet p.o. daily.



ALLERGIES:  No known drug allergies.



PAST SURGICAL HISTORY:  Placement of left-sided dialysis catheter.



SOCIAL HISTORY:  Denies any tobacco, alcohol, or illicit drug use.



REVIEW OF SYSTEMS:  HEENT:  Denies any headache, diplopia, or blurred

vision.   CONSTITUTIONAL:  Denies any fever, chills, night sweats, or

weight loss.  CARDIOVASCULAR:  Denies any chest pain.  Positive for

shortness of breath.  Denies any PND, orthopnea, leg swelling,

palpitations, or syncope.   PULMONARY:  Positive for shortness of breath,

but no cough or hemoptysis.    GASTROINTESTINAL:  Denies any nausea,

vomiting, diarrhea, constipation, abdominal pain, or GI bleed.

GENITOURINARY:  On hemodialysis three days a week.  NEUROLOGIC:  Denies

any motor dysfunction, sensory deficit, or altered speech.



LABORATORY AND DIAGNOSTIC DATA:  Laboratory finding latest from 2020,

sodium 135, potassium 4.1, chloride 97, bicarbonate 27, BUN of 37,

creatinine 8.3, glucose 141, calcium is 8.3, and magnesium is 2.2.

Troponin I x2, negative.  CRP 35.1.  ProBNP 12,508.  Triglyceride 125,

total cholesterol 145, LDL 52, and HDL of 62.



ASSESSMENT AND PLAN:  The patient is a very unfortunate 84-year-old

gentleman who was seen in Cardiology consultation.



1. Trigeminy ventricular premature depolarizations.  We would like to

discontinue labetalol and start the patient on metoprolol _____ mg p.o.

daily and continue monitoring the patient's rhythm and keep the heart rate

between high 50s and mid 60s.

2. Pulmonary edema, most likely COVID-19.

3. Bilateral pneumonia, the patient most likely suffers from chronic

heart failure with preserved ejection fraction.  At this time, we will

continue with hemodialysis and afterload reduction if needed.

4. History of diabetes mellitus.  The patient benefits from aspirin and

statin.

5. History of hypertension, currently blood pressure is well controlled

with beta-blockers.  We will prefer long-acting metoprolol _____ less

hypertensive effects.



I would like to thank, , for the courtesy of this

consultation.









  ______________________________________________

  Mike Todd M.D. DR:  Charity

D:  2020 21:00

T:  2020 00:43

JOB#:  [id number]

CC:

## 2020-04-24 NOTE — NUR
***DISCHARGE PLANNING

DISCHARGE PLAN DISCUSSED WITH DR ROJO

1) PHYSICAL THERAPY EVAL

2) OBTAIN ID CLEARANCE FOR DISCHARGE

3) REFER TO [facility] FOR OT/PT AND NURSE VISIT



**PATIENT USUALLY GOES TO [facility] T: [phone]....SPOKE WITH KARLA

**SINCE PATIENT IS COVID + [facility] CANNOT ACCOMMODATE HIM

**UPON DISCHARGE PATIENT WILL GO TO [facility] T: [phone] T-TH-SAT @ 8:30 
AM..SPOKE WITH JOSHUA

## 2020-04-24 NOTE — NUR
CASE MANAGEMENT:REVIEW



4/24/20

SI: COVID 19 PNEUMONIA. ESRD/HD

98.8   78  18  112/63  95% ON 2L/NC

WBC+12.9   H/H-9.6/29.2    BUN+45  CR+9.3



IS: LOVENOX SQ QD

RENVELA PO TID

SENSIPAR PO QD

LABETALOL PO QD

SS INSULIN AC+HS

PROTONIX PO Q12

NEPHROVITE PO QD

SS INSULIN AC+HS

PROTONIX PO Q12

**: TELEMETRY STATUS

DCP: FROM HOME WITH OUTPT DIALYSIS



PLAN: 

WILL NEED TO CHANGE DIALYSIS CENTER D/T COVID 19...SEE DISCHARGE NOTE

REPEAT COVID 19 TEST

## 2020-04-24 NOTE — NUR
HAND-OFF: 

Report given to Citlalli Garcia, endorsed that the patient needs ID clearance from [facility] to d/c 
monday see other nursing order.

## 2020-04-24 NOTE — GENERAL PROGRESS NOTE
Assessment/Plan


Problem List:  


(1) Hyperkalemia


ICD Codes:  E87.5 - Hyperkalemia


SNOMED:  76449501


(2) ESRD (end stage renal disease)


ICD Codes:  N18.6 - End stage renal disease


SNOMED:  64636144


(3) Suspected COVID-19 virus infection


ICD Codes:  R68.89 - Other general symptoms and signs


SNOMED:  661866811


Status:  progressing


Assessment/Plan:


covid positive


htn


esrd on hd


bp improving


afebrile


check lytes





Subjective


ROS Limited/Unobtainable:  Yes


Allergies:  


Coded Allergies:  


     No Known Allergies (Unverified , 4/16/13)





Objective





Last 24 Hour Vital Signs








  Date Time  Temp Pulse Resp B/P (MAP) Pulse Ox O2 Delivery O2 Flow Rate FiO2


 


4/24/20 18:02 97.6       


 


4/24/20 16:15  111      


 


4/24/20 16:00 98.0 99 18 132/79 (96) 98   


 


4/24/20 12:00 97.6 72 18 115/59 (77) 96   


 


4/24/20 11:45  75      


 


4/24/20 09:36  74  109/78    


 


4/24/20 08:00 98.8 78 18 112/63 (79) 95   


 


4/24/20 07:48      Nasal Cannula 2.0 


 


4/24/20 07:46  85      


 


4/24/20 04:00  74      


 


4/24/20 04:00 98.2 74 20 109/78 (88) 94   


 


4/24/20 00:00  75      


 


4/24/20 00:00  75      

















Intake and Output  


 


 4/23/20 4/24/20





 18:59 06:59


 


Intake Total 240 ml 120 ml


 


Balance 240 ml 120 ml


 


  


 


Intake Oral 240 ml 120 ml


 


# Voids 2 








Laboratory Tests


4/24/20 06:50: 


White Blood Count 12.9H, Red Blood Count 3.39L, Hemoglobin 9.6L, Hematocrit 

29.2L, Mean Corpuscular Volume 86, Mean Corpuscular Hemoglobin 28.4, Mean 

Corpuscular Hemoglobin Concent 33.0, Red Cell Distribution Width 13.1, Platelet 

Count 254, Mean Platelet Volume 7.4, Neutrophils (%) (Auto) 75.2H, Lymphocytes (

%) (Auto) 12.4L, Monocytes (%) (Auto) 9.8, Eosinophils (%) (Auto) 1.8, 

Basophils (%) (Auto) 0.8, Sodium Level 133L, Potassium Level 4.2, Chloride 

Level 95L, Carbon Dioxide Level 26, Anion Gap 12, Blood Urea Nitrogen 45H, 

Creatinine 9.3H, Estimat Glomerular Filtration Rate 5.5, Glucose Level 99, 

Calcium Level 8.7, Phosphorus Level 3.3, Total Bilirubin 0.6, Aspartate Amino 

Transf (AST/SGOT) 25, Alanine Aminotransferase (ALT/SGPT) 36, Alkaline 

Phosphatase 110, C-Reactive Protein, Quantitative 4.0H, Total Protein 7.3, 

Albumin 2.5L, Globulin 4.8, Albumin/Globulin Ratio 0.5L


Height (Feet):  5


Height (Inches):  5.00


Weight (Pounds):  150











Van Silva MD Apr 24, 2020 21:09

## 2020-04-24 NOTE — NUR
NURSE NOTES:

Pt in bed in low position, in semi fowlers position, pt scheduled for dialysis today and RN 
notified, pt in room calm and cooperative, denies pain, no s/s of distress or pain  or sob 
noted, pt has breakfast at bedside.

## 2020-04-25 VITALS — SYSTOLIC BLOOD PRESSURE: 99 MMHG | DIASTOLIC BLOOD PRESSURE: 51 MMHG

## 2020-04-25 VITALS — SYSTOLIC BLOOD PRESSURE: 126 MMHG | DIASTOLIC BLOOD PRESSURE: 76 MMHG

## 2020-04-25 VITALS — DIASTOLIC BLOOD PRESSURE: 56 MMHG | SYSTOLIC BLOOD PRESSURE: 100 MMHG

## 2020-04-25 VITALS — DIASTOLIC BLOOD PRESSURE: 61 MMHG | SYSTOLIC BLOOD PRESSURE: 121 MMHG

## 2020-04-25 VITALS — SYSTOLIC BLOOD PRESSURE: 120 MMHG | DIASTOLIC BLOOD PRESSURE: 74 MMHG

## 2020-04-25 VITALS — SYSTOLIC BLOOD PRESSURE: 112 MMHG | DIASTOLIC BLOOD PRESSURE: 58 MMHG

## 2020-04-25 RX ADMIN — SEVELAMER CARBONATE SCH MG: 800 POWDER, FOR SUSPENSION ORAL at 16:59

## 2020-04-25 RX ADMIN — CINACALCET HYDROCHLORIDE SCH MG: 30 TABLET, COATED ORAL at 08:55

## 2020-04-25 RX ADMIN — INSULIN ASPART SCH UNITS: 100 INJECTION, SOLUTION INTRAVENOUS; SUBCUTANEOUS at 21:00

## 2020-04-25 RX ADMIN — INSULIN ASPART SCH UNITS: 100 INJECTION, SOLUTION INTRAVENOUS; SUBCUTANEOUS at 16:58

## 2020-04-25 RX ADMIN — SEVELAMER CARBONATE SCH MG: 800 POWDER, FOR SUSPENSION ORAL at 08:55

## 2020-04-25 RX ADMIN — INSULIN ASPART SCH UNITS: 100 INJECTION, SOLUTION INTRAVENOUS; SUBCUTANEOUS at 06:30

## 2020-04-25 RX ADMIN — ENOXAPARIN SODIUM SCH MG: 30 INJECTION SUBCUTANEOUS at 08:58

## 2020-04-25 RX ADMIN — SEVELAMER CARBONATE SCH MG: 800 POWDER, FOR SUSPENSION ORAL at 13:44

## 2020-04-25 RX ADMIN — Medication SCH TAB: at 08:55

## 2020-04-25 RX ADMIN — INSULIN ASPART SCH UNITS: 100 INJECTION, SOLUTION INTRAVENOUS; SUBCUTANEOUS at 11:30

## 2020-04-25 NOTE — NUR
NURSE NOTES:

PATIENT REQUESTING ORDER FOR MEDICATION TO HELP HIM SLEEP. CONTACTED DR. ROJO REGARDING 
REQUEST, AWAITING CALL BACK.

## 2020-04-25 NOTE — CARDIOLOGY PROGRESS NOTE
Assessment/Plan


Assessment/Plan


1. Trigeminy ventricular premature depolarizations.  Continue metoprolol XL.


2. Pulmonary edema due to COVID-19 infection, chronic diastolic heart failure 

can contribute to some extent, continue with hemodialysis. Continue metoprolol.


3. History of diabetes mellitus.  Continue aspirin and atorvastatin.


4. History of hypertension, will switch to metoprolol.





Subjective


Subjective


Sinus rhythm at rate of 70.





Objective





Last 24 Hour Vital Signs








  Date Time  Temp Pulse Resp B/P (MAP) Pulse Ox O2 Delivery O2 Flow Rate FiO2


 


4/25/20 20:00 97.3 70 21 121/61 (81) 94   


 


4/25/20 16:00 98.8 100 20 100/56 (71) 96   


 


4/25/20 16:00  92      


 


4/25/20 12:00  72      


 


4/25/20 12:00 97.0 80 20 112/58 (76) 98   


 


4/25/20 09:00      Nasal Cannula 2.0 


 


4/25/20 08:55  77  99/51    


 


4/25/20 08:00 98.0 77 20 99/51 (67) 95   


 


4/25/20 08:00  78      


 


4/25/20 04:00  86      


 


4/25/20 04:00 98.0 72 16 120/74 (89) 98   


 


4/25/20 00:00 98.2 80 18 126/76 (93) 96   


 


4/25/20 00:00  82      

















Intake and Output  


 


 4/24/20 4/25/20





 19:00 07:00


 


Intake Total  0 ml


 


Output Total 1000 ml 


 


Balance -1000 ml 0 ml


 


  


 


Intake Oral  0 ml


 


Hemodialysis UF 1000 ml 


 


# Voids  2











Laboratory Tests








Test


  4/25/20


17:00


 


Stool Occult Blood Pending  











Microbiology








 Date/Time


Source Procedure


Growth Status


 


 


 4/23/20 13:10


Nasopharynx Coronavirus COVID-19 PCR ([facility]) - Final Complete

















Mike Todd MD Apr 25, 2020 23:16

## 2020-04-25 NOTE — GENERAL PROGRESS NOTE
Assessment/Plan


Problem List:  


(1) Hyperkalemia


ICD Codes:  E87.5 - Hyperkalemia


SNOMED:  99075370


(2) ESRD (end stage renal disease)


ICD Codes:  N18.6 - End stage renal disease


SNOMED:  99011327


(3) Suspected COVID-19 virus infection


ICD Codes:  R68.89 - Other general symptoms and signs


SNOMED:  898503247


Status:  progressing


Assessment/Plan:


covid positive


htn


esrd on hd


lytes abnormality


afebrile


no acute events


no wheezing


check lytes





Subjective


ROS Limited/Unobtainable:  Yes


Allergies:  


Coded Allergies:  


     No Known Allergies (Unverified , 4/16/13)





Objective





Last 24 Hour Vital Signs








  Date Time  Temp Pulse Resp B/P (MAP) Pulse Ox O2 Delivery O2 Flow Rate FiO2


 


4/25/20 16:00 98.8 100 20 100/56 (71) 96   


 


4/25/20 16:00  92      


 


4/25/20 12:00  72      


 


4/25/20 12:00 97.0 80 20 112/58 (76) 98   


 


4/25/20 09:00      Nasal Cannula 2.0 


 


4/25/20 08:55  77  99/51    


 


4/25/20 08:00 98.0 77 20 99/51 (67) 95   


 


4/25/20 08:00  78      


 


4/25/20 04:00  86      


 


4/25/20 04:00 98.0 72 16 120/74 (89) 98   


 


4/25/20 00:00 98.2 80 18 126/76 (93) 96   


 


4/25/20 00:00  82      


 


4/24/20 21:00      Nasal Cannula 2.0 


 


4/24/20 20:00  102      


 


4/24/20 20:00 97.9 100 16 127/76 (93) 97   

















Intake and Output  


 


 4/24/20 4/25/20





 19:00 07:00


 


Intake Total  0 ml


 


Output Total 1000 ml 


 


Balance -1000 ml 0 ml


 


  


 


Intake Oral  0 ml


 


Hemodialysis UF 1000 ml 


 


# Voids  2








Laboratory Tests


4/25/20 17:00: Stool Occult Blood [Pending]


Height (Feet):  5


Height (Inches):  5.00


Weight (Pounds):  150











Van Silva MD Apr 25, 2020 19:59

## 2020-04-25 NOTE — GI INITIAL CONSULT NOTE
History of Present Illness


General


Date patient seen:  Apr 25, 2020


Time patient seen:  12:44


Reason for Hospitalization:  Dyspnea/Respdistress


Referring physician:  ROYM ROJO


Reason for Consultation:  ABDOMINAL PAIN





Present Illness


HPI


This is a 84-year-old male patient with past medical history of end-stage renal 

disease on hemodialysis, COPD, hypertension, history of diabetes, history of 

seizures presents with complaint of shortness of breath, cough, upper 

respiratory symptoms.  Patient now positive for COVID 192.  Gastroenterology 

consulted for reported abdominal pain.  At the time of evaluation, the patient 

denied any generalized or abdominal pain.  Denied any diarrhea.


Home Meds


Active Scripts


Cephalexin* (KEFLEX*) 250 Mg Cap, 250 MG PO Q6HR for 5 Days


   Prov:JASON AGUILAR         4/16/13


Reported Medications


Labetalol Hcl* (NORMODYNE*) 100 Mg Tablet, 100 MG ORAL DAILY, #20 TAB


   4/24/13


Vitamin B Cmplx/Vit C/Folic AC (Nephro-Makenzie Tablet) 1 Tab Tab, 1 TAB ORAL DAILY

, TAB


   4/24/13


Sevelamer Carbonate* (RENVELA*) 0.8 Gm Powd.pack, 800 MG ORAL THREE TIMES A DAY


   4/24/13


Cinacalcet* (SENSIPAR*) 30 Mg Tablet, 30 MG ORAL DAILY


   4/24/13


Med list reviewed/reconciled:  Yes


Allergies:  


Coded Allergies:  


     No Known Allergies (Unverified , 4/16/13)





Patient History


History Provided By:  Patient, Medical Record


[facility] Narrative


PAST MEDICAL HISTORY:  Significant for end-stage renal disease, on hemodialysis

, COPD, hypertension, history of diabetes, history of seizures.





PAST SURGICAL HISTORY:  Related to hemodialysis.





FAMILY HISTORY:  Noncontributory.





SOCIAL HISTORY:  Denies history of smoking.  Denies history of alcohol or IVDA 

use.


Social History:  Denies: smoking, alcohol use, drug use, other





Review of Systems


All Other Systems:  negative except mentioned in HPI





Physical Exam





Vital Signs








  Date Time  Temp Pulse Resp B/P (MAP) Pulse Ox O2 Delivery O2 Flow Rate FiO2


 


4/21/20 08:00 100.8 75 20 121/48 (72) 97   


 


4/21/20 09:00      Nasal Cannula 2.0 








Sp02 EP Interpretation:  reviewed, normal


General Appearance:  well appearing, no apparent distress, alert


Head:  normocephalic


EENT:  PERRL/EOMI, normal ENT inspection


Neck:  supple


Respiratory:  normal breath sounds, no respiratory distress


Cardiovascular:  normal rate


Gastrointestinal:  normal inspection, non tender, soft, normal bowel sounds, non

-distended


Rectal:  deferred


Genitourinary:  deferred


Musculoskeletal:  normal inspection, back normal


Neurologic:  alert, oriented x3, responsive, normal inspection


Skin:  normal inspection, normal color, no rash, warm/dry, palpation normal, 

well hydrated


Lymphatic:  normal inspection, no adenopathy


Current Medications





Current Medications








 Medications


  (Trade)  Dose


 Ordered  Sig/Eliazar


 Route


 PRN Reason  Start Time


 Stop Time Status Last Admin


Dose Admin


 


 Acetaminophen


  (Tylenol)  500 mg  Q6H  PRN


 ORAL


 Temp >100.5  4/15/20 01:00


 5/15/20 00:59  4/21/20 10:34


 


 


 Acetaminophen


  (Tylenol)  650 mg  Q6H  PRN


 ORAL


 For Pain  4/24/20 16:01


 5/24/20 16:00  4/24/20 16:28


 


 


 Cinacalcet


  (Sensipar)  30 mg  DAILY


 ORAL


   4/15/20 09:00


 7/14/20 08:59  4/25/20 08:55


 


 


 Dextrose


  (Dextrose 50%)  25 ml  Q30M  PRN


 IV


 Hypoglycemia  4/15/20 01:15


 7/14/20 01:14   


 


 


 Dextrose


  (Dextrose 50%)  50 ml  Q30M  PRN


 IV


 Hypoglycemia  4/15/20 01:15


 7/14/20 01:14   


 


 


 Enoxaparin Sodium


  (Lovenox)  30 mg  DAILY


 SUBQ


   4/21/20 09:00


 7/20/20 08:59  4/25/20 08:58


 


 


 Hydralazine HCl


  (Apresoline)  25 mg  Q4H  PRN


 ORAL


 bp over 160 syst  4/14/20 19:45


 7/13/20 19:44   


 


 


 Insulin Aspart


  (NovoLOG)    BEFORE MEALS AND  HS


 SUBQ


   4/15/20 06:30


 7/14/20 06:29  4/22/20 16:43


 


 


 Labetalol HCl


  (Normodyne)  100 mg  DAILY


 ORAL


   4/15/20 09:00


 5/15/20 08:59  4/21/20 10:34


 


 


 Levofloxacin


  (Levaquin)  500 mg  Q48H


 ORAL


   4/26/20 09:00


 5/3/20 08:59   


 


 


 Pantoprazole


  (Protonix)  40 mg  Q12HR


 ORAL


   4/14/20 21:00


 5/14/20 20:59  4/25/20 08:55


 


 


 Sevelamer


 Carbonate


  (Renvela)  800 mg  THREE TIMES A  DAY


 ORAL


   4/15/20 09:00


 7/14/20 08:59  4/25/20 08:55


 


 


 Vitamin B Complex/


 Vit C/Folic Acid


  (Nephrovite)  1 tab  DAILY


 ORAL


   4/15/20 09:00


 5/15/20 08:59  4/25/20 08:55


 











GI: Plan


Problems:  


(1) Abdominal pain


(2) COVID-19 virus infection


(3) ESRD (end stage renal disease)


Plan


Abdominal Pain unknown etiology.  Given that the patient is positive for COVID-

19, the patient may demonstrate gastrointestinal symptoms such as diarrhea, 

nausea or vomiting in which none are present at this time.  Less likely biliary 

colic or cholecystitis given normal liver enzymes.


-We will obtain KUB to evaluate for constipation given last documented bowel 

movement on April 23, 2020.


-Tylenol prn for pain





Anemia most likely secondary to end-stage renal disease.


- Obtain occult blood stool to evaluate for any GI bleed


- no plans for GI procedures unless emergent


-Monitor H&H, PRN transfusions


-PPI p.o. daily





Discussed with Dr. Kessler.


Thank you for this patient referral, we will follow. 





The patient was seen and examined at bedside and all new and available data was 

reviewed in the patients chart. I agree with the above findings, impression 

and plan.  (Patient seen earlier today. Signature stamp does not reflect 

patient encounter time.). - [facility]Solomon [facility] Apr 25, 2020 12:57

## 2020-04-25 NOTE — NUR
NURSE NOTES:NURSE NOTES:Handoff received from NI Lennon. Patient received awake and alert and 
able to make needs known, no acute signs of distress noted. Patient is on isolation for 
Covid. On room air, patient does not have IV access, informed that MD is aware. Bed in the 
low and locked position with call light within reach, will continue to monitor patient.

## 2020-04-25 NOTE — NUR
NURSE NOTES:

WHILE CHANGING LEADS/ELECTRODES ON PATIENT, PATIENT ASLEEP. PER PATIENT, DOES NOT NEED 
SLEEPING PILL RIGHT NOW. WILL CONTINUE TO MONITOR FOR ANY CHANGES.

## 2020-04-25 NOTE — DIAGNOSTIC IMAGING REPORT
EXAM:

  XR Abdomen, 2 Views

 

CLINICAL HISTORY:

  ABD PAIN

 

TECHNIQUE:

  Frontal view of the abdomen/pelvis with upright view of the abdomen.

 

COMPARISON:

CXR of 4/23/20.

 

FINDINGS:

  Intraperitoneal space:  No free air.

  Gastrointestinal tract: Nonobstructive gas pattern.

  Bones/joints: Minimal lumbar levocurvature.  Spondylosis.

Other: Left chest HERO catheter again noted.

 

IMPRESSION:     

Nonobstructive bowel gas pattern.  No free air.

## 2020-04-25 NOTE — NEPHROLOGY PROGRESS NOTE
Assessment/Plan


Problem List:  


(1) ESRD (end stage renal disease)


(2) Suspected COVID-19 virus infection


(3) Hyperkalemia


(4) COVID-19 virus infection


Assessment





End-stage renal disease on hemodialysis


Has arm fistula for dialysis-Next dialysis Wednesday, April 15


Dyspnea and shortness of breath, history of COPD, oxygen dependent at home


Exposure to COVID 19


Diabetes mellitus


Hypertension


Remote history of seizure


Anemia of kidney disease


Plan





COVID 19 test detected





Hemodialysis  April 22 next April 24


Keep the blood pressure and blood sugar in check


2D echocardiogram pending


Antibiotics and pulmonary support per consultants





Chest x-ray:


Findings: There is a left chest  HERO catheter, tip projected at the level of 

the


superior vena cava. Some atelectatic bands are seen in the left midlung. The 

lungs


and pleural spaces are otherwise clear. The heart size is upper limits of 

normal. A


stent is seen in the left axilla


Impression: Left midlung atelectasis. No acute process otherwise





Subjective


ROS Limited/Unobtainable:  No


Constitutional:  Reports: malaise





Objective


Objective





Last 24 Hour Vital Signs








  Date Time  Temp Pulse Resp B/P (MAP) Pulse Ox O2 Delivery O2 Flow Rate FiO2


 


4/25/20 09:00      Nasal Cannula 2.0 


 


4/25/20 08:55  77  99/51    


 


4/25/20 08:00 98.0 77 20 99/51 (67) 95   


 


4/25/20 04:00  86      


 


4/25/20 04:00 98.0 72 16 120/74 (89) 98   


 


4/25/20 00:00 98.2 80 18 126/76 (93) 96   


 


4/25/20 00:00  82      


 


4/24/20 21:00      Nasal Cannula 2.0 


 


4/24/20 20:00  102      


 


4/24/20 20:00 97.9 100 16 127/76 (93) 97   


 


4/24/20 18:02 97.6       


 


4/24/20 16:15  111      


 


4/24/20 16:00 98.0 99 18 132/79 (96) 98   


 


4/24/20 12:00 97.6 72 18 115/59 (77) 96   


 


4/24/20 11:45  75      

















Intake and Output  


 


 4/24/20 4/25/20





 19:00 07:00


 


Intake Total  0 ml


 


Output Total 1000 ml 


 


Balance -1000 ml 0 ml


 


  


 


Intake Oral  0 ml


 


Hemodialysis UF 1000 ml 


 


# Voids  2








Height (Feet):  5


Height (Inches):  5.00


Weight (Pounds):  150


General Appearance:  no apparent distress


Objective


no change











Vik Morrison MD Apr 25, 2020 10:41

## 2020-04-25 NOTE — PULMONOLOGY PROGRESS NOTE
Assessment/Plan


Assessment/Plan


IMPRESSION:


1. History of COVID-19 exposure. Is + COVID 19 here


2. ESRD, on dialysis.


3. Diabetes mellitus.


4. Dyspnea.





DISCUSSION:  





Consider use of Plaquenil or azithromycin only if patient becomes hypoxic.


Currently SaO2 92-95% on RA alternating with 2L/min O2


Currently, he is doing well.  I would avoid unnecessary antibiotics.


Agree with ongoing dialysis.  I will follow carefully.














  ______________________________________________


  Oh Solomon M.D.





Subjective


ROS Limited/Unobtainable:  No


Interval Events:  + COVID 19; no new complaints


Constitutional:  Denies: fever


HEENT:  Repors: no symptoms


Respiratory:  Reports: no symptoms


Cardiovascular:  Reports: no symptoms


Gastrointestinal/Abdominal:  Reports: no symptoms


Allergies:  


Coded Allergies:  


     No Known Allergies (Unverified , 4/16/13)





Objective





Last 24 Hour Vital Signs








  Date Time  Temp Pulse Resp B/P (MAP) Pulse Ox O2 Delivery O2 Flow Rate FiO2


 


4/25/20 12:00  72      


 


4/25/20 12:00 97.0 80 20 112/58 (76) 98   


 


4/25/20 09:00      Nasal Cannula 2.0 


 


4/25/20 08:55  77  99/51    


 


4/25/20 08:00 98.0 77 20 99/51 (67) 95   


 


4/25/20 08:00  78      


 


4/25/20 04:00  86      


 


4/25/20 04:00 98.0 72 16 120/74 (89) 98   


 


4/25/20 00:00 98.2 80 18 126/76 (93) 96   


 


4/25/20 00:00  82      


 


4/24/20 21:00      Nasal Cannula 2.0 


 


4/24/20 20:00  102      


 


4/24/20 20:00 97.9 100 16 127/76 (93) 97   


 


4/24/20 18:02 97.6       


 


4/24/20 16:15  111      


 


4/24/20 16:00 98.0 99 18 132/79 (96) 98   

















Intake and Output  


 


 4/24/20 4/25/20





 19:00 07:00


 


Intake Total  0 ml


 


Output Total 1000 ml 


 


Balance -1000 ml 0 ml


 


  


 


Intake Oral  0 ml


 


Hemodialysis UF 1000 ml 


 


# Voids  2








General Appearance:  no acute distress


HEENT:  mucous membranes moist


Respiratory/Chest:  chest wall non-tender, lungs clear


Cardiovascular:  normal peripheral pulses


Abdomen:  soft, non tender


Extremities:  no edema


Neurologic/Psychiatric:  other - sleeping





Microbiology








 Date/Time


Source Procedure


Growth Status


 


 


 4/23/20 13:10


Nasopharynx Coronavirus COVID-19 PCR ([facility]) - Final Complete











Current Medications








 Medications


  (Trade)  Dose


 Ordered  Sig/Eliazar


 Route


 PRN Reason  Start Time


 Stop Time Status Last Admin


Dose Admin


 


 Acetaminophen


  (Tylenol)  500 mg  Q6H  PRN


 ORAL


 Temp >100.5  4/15/20 01:00


 5/15/20 00:59  4/21/20 10:34


 


 


 Acetaminophen


  (Tylenol)  650 mg  Q6H  PRN


 ORAL


 For Pain  4/24/20 16:01


 5/24/20 16:00  4/24/20 16:28


 


 


 Cinacalcet


  (Sensipar)  30 mg  DAILY


 ORAL


   4/15/20 09:00


 7/14/20 08:59  4/25/20 08:55


 


 


 Dextrose


  (Dextrose 50%)  25 ml  Q30M  PRN


 IV


 Hypoglycemia  4/15/20 01:15


 7/14/20 01:14   


 


 


 Dextrose


  (Dextrose 50%)  50 ml  Q30M  PRN


 IV


 Hypoglycemia  4/15/20 01:15


 7/14/20 01:14   


 


 


 Enoxaparin Sodium


  (Lovenox)  30 mg  DAILY


 SUBQ


   4/21/20 09:00


 7/20/20 08:59  4/25/20 08:58


 


 


 Hydralazine HCl


  (Apresoline)  25 mg  Q4H  PRN


 ORAL


 bp over 160 syst  4/14/20 19:45


 7/13/20 19:44   


 


 


 Insulin Aspart


  (NovoLOG)    BEFORE MEALS AND  HS


 SUBQ


   4/15/20 06:30


 7/14/20 06:29  4/22/20 16:43


 


 


 Labetalol HCl


  (Normodyne)  100 mg  DAILY


 ORAL


   4/15/20 09:00


 5/15/20 08:59  4/21/20 10:34


 


 


 Levofloxacin


  (Levaquin)  500 mg  Q48H


 ORAL


   4/26/20 09:00


 5/3/20 08:59   


 


 


 Pantoprazole


  (Protonix)  40 mg  Q12HR


 ORAL


   4/14/20 21:00


 5/14/20 20:59  4/25/20 08:55


 


 


 Sevelamer


 Carbonate


  (Renvela)  800 mg  THREE TIMES A  DAY


 ORAL


   4/15/20 09:00


 7/14/20 08:59  4/25/20 08:55


 


 


 Vitamin B Complex/


 Vit C/Folic Acid


  (Nephrovite)  1 tab  DAILY


 ORAL


   4/15/20 09:00


 5/15/20 08:59  4/25/20 08:55


 

















Oh Solomon MD Apr 25, 2020 12:49

## 2020-04-26 VITALS — SYSTOLIC BLOOD PRESSURE: 113 MMHG | DIASTOLIC BLOOD PRESSURE: 47 MMHG

## 2020-04-26 VITALS — SYSTOLIC BLOOD PRESSURE: 128 MMHG | DIASTOLIC BLOOD PRESSURE: 59 MMHG

## 2020-04-26 VITALS — DIASTOLIC BLOOD PRESSURE: 54 MMHG | SYSTOLIC BLOOD PRESSURE: 122 MMHG

## 2020-04-26 VITALS — SYSTOLIC BLOOD PRESSURE: 126 MMHG | DIASTOLIC BLOOD PRESSURE: 54 MMHG

## 2020-04-26 VITALS — DIASTOLIC BLOOD PRESSURE: 55 MMHG | SYSTOLIC BLOOD PRESSURE: 131 MMHG

## 2020-04-26 VITALS — DIASTOLIC BLOOD PRESSURE: 51 MMHG | SYSTOLIC BLOOD PRESSURE: 147 MMHG

## 2020-04-26 LAB
ADD MANUAL DIFF: NO
ANION GAP SERPL CALC-SCNC: 12 MMOL/L (ref 5–15)
BASOPHILS NFR BLD AUTO: 0.6 % (ref 0–2)
BUN SERPL-MCNC: 39 MG/DL (ref 7–18)
CALCIUM SERPL-MCNC: 9 MG/DL (ref 8.5–10.1)
CHLORIDE SERPL-SCNC: 96 MMOL/L (ref 98–107)
CO2 SERPL-SCNC: 29 MMOL/L (ref 21–32)
CREAT SERPL-MCNC: 8.6 MG/DL (ref 0.55–1.3)
EOSINOPHIL NFR BLD AUTO: 1.7 % (ref 0–3)
ERYTHROCYTE [DISTWIDTH] IN BLOOD BY AUTOMATED COUNT: 13.5 % (ref 11.6–14.8)
HCT VFR BLD CALC: 28.9 % (ref 42–52)
HGB BLD-MCNC: 9.2 G/DL (ref 14.2–18)
LYMPHOCYTES NFR BLD AUTO: 14.7 % (ref 20–45)
MCV RBC AUTO: 88 FL (ref 80–99)
MONOCYTES NFR BLD AUTO: 6.7 % (ref 1–10)
NEUTROPHILS NFR BLD AUTO: 76.3 % (ref 45–75)
PLATELET # BLD: 270 K/UL (ref 150–450)
POTASSIUM SERPL-SCNC: 4.1 MMOL/L (ref 3.5–5.1)
RBC # BLD AUTO: 3.3 M/UL (ref 4.7–6.1)
SODIUM SERPL-SCNC: 136 MMOL/L (ref 136–145)
WBC # BLD AUTO: 12.7 K/UL (ref 4.8–10.8)

## 2020-04-26 RX ADMIN — ASPIRIN SCH MG: 81 TABLET, DELAYED RELEASE ORAL at 09:14

## 2020-04-26 RX ADMIN — CINACALCET HYDROCHLORIDE SCH MG: 30 TABLET, COATED ORAL at 09:14

## 2020-04-26 RX ADMIN — ATORVASTATIN CALCIUM SCH MG: 20 TABLET, FILM COATED ORAL at 22:38

## 2020-04-26 RX ADMIN — INSULIN ASPART SCH UNITS: 100 INJECTION, SOLUTION INTRAVENOUS; SUBCUTANEOUS at 06:30

## 2020-04-26 RX ADMIN — SEVELAMER CARBONATE SCH MG: 800 POWDER, FOR SUSPENSION ORAL at 17:31

## 2020-04-26 RX ADMIN — INSULIN ASPART SCH UNITS: 100 INJECTION, SOLUTION INTRAVENOUS; SUBCUTANEOUS at 21:00

## 2020-04-26 RX ADMIN — SEVELAMER CARBONATE SCH MG: 800 POWDER, FOR SUSPENSION ORAL at 11:57

## 2020-04-26 RX ADMIN — INSULIN ASPART SCH UNITS: 100 INJECTION, SOLUTION INTRAVENOUS; SUBCUTANEOUS at 16:34

## 2020-04-26 RX ADMIN — SEVELAMER CARBONATE SCH MG: 800 POWDER, FOR SUSPENSION ORAL at 09:14

## 2020-04-26 RX ADMIN — INSULIN ASPART SCH UNITS: 100 INJECTION, SOLUTION INTRAVENOUS; SUBCUTANEOUS at 11:45

## 2020-04-26 RX ADMIN — ENOXAPARIN SODIUM SCH MG: 30 INJECTION SUBCUTANEOUS at 09:13

## 2020-04-26 RX ADMIN — METOPROLOL SUCCINATE SCH MG: 50 TABLET, EXTENDED RELEASE ORAL at 09:14

## 2020-04-26 RX ADMIN — Medication SCH TAB: at 09:14

## 2020-04-26 NOTE — INFECTIOUS DISEASES PROG NOTE
Assessment/Plan


Assessment/Plan


IMPRESSION:


1. Leukocytosis


2. COVID19 disease.likely mild disease


    Positive: 4/14-4/23


3. COPD.


4. End-stage renal disease on hemodialysis.


5. Diabetes mellitus.


6. Hypertension.


7. Anemia.





RECOMMENDATION:  


Continue Levaquin


Will f/u COVID19 test





Subjective


ROS Limited/Unobtainable:  Yes


Respiratory:  Reports: no symptoms


Allergies:  


Coded Allergies:  


     No Known Allergies (Unverified , 4/16/13)





Objective


Vital Signs





Last 24 Hour Vital Signs








  Date Time  Temp Pulse Resp B/P (MAP) Pulse Ox O2 Delivery O2 Flow Rate FiO2


 


4/26/20 09:14  71  126/54    


 


4/26/20 09:00      Nasal Cannula 2.0 


 


4/26/20 08:00  70      


 


4/26/20 08:00 97.8 71 20 126/54 (78) 94   


 


4/26/20 04:00 97.3 72 20 131/55 (80) 92   


 


4/26/20 04:00  70      


 


4/26/20 00:00 97.9 74 19 128/59 (82) 93   


 


4/26/20 00:00  72      


 


4/25/20 21:00      Nasal Cannula 2.0 


 


4/25/20 20:00 97.3 70 21 121/61 (81) 94   


 


4/25/20 20:00  73      


 


4/25/20 16:00 98.8 100 20 100/56 (71) 96   


 


4/25/20 16:00  92      








Height (Feet):  5


Height (Inches):  5.00


Weight (Pounds):  150


General Appearance:  no acute distress


HEENT:  mucous membranes moist


Respiratory/Chest:  lungs clear


Cardiovascular:  normal rate


Abdomen:  soft, non tender


Extremities:  no edema


Neurologic/Psychiatric:  alert, responsive





Microbiology








 Date/Time


Source Procedure


Growth Status


 


 


 4/23/20 13:10


Nasopharynx Coronavirus COVID-19 PCR ([facility]) - Final Complete











Laboratory Tests








Test


  4/25/20


17:00 4/26/20


06:40


 


Stool Occult Blood Pending   


 


White Blood Count


  


  12.7 K/UL


(4.8-10.8)  H


 


Red Blood Count


  


  3.30 M/UL


(4.70-6.10)  L


 


Hemoglobin


  


  9.2 G/DL


(14.2-18.0)  L


 


Hematocrit


  


  28.9 %


(42.0-52.0)  L


 


Mean Corpuscular Volume  88 FL (80-99)  


 


Mean Corpuscular Hemoglobin


  


  27.9 PG


(27.0-31.0)


 


Mean Corpuscular Hemoglobin


Concent 


  31.8 G/DL


(32.0-36.0)  L


 


Red Cell Distribution Width


  


  13.5 %


(11.6-14.8)


 


Platelet Count


  


  270 K/UL


(150-450)


 


Mean Platelet Volume


  


  7.1 FL


(6.5-10.1)


 


Neutrophils (%) (Auto)


  


  76.3 %


(45.0-75.0)  H


 


Lymphocytes (%) (Auto)


  


  14.7 %


(20.0-45.0)  L


 


Monocytes (%) (Auto)


  


  6.7 %


(1.0-10.0)


 


Eosinophils (%) (Auto)


  


  1.7 %


(0.0-3.0)


 


Basophils (%) (Auto)


  


  0.6 %


(0.0-2.0)


 


Sodium Level


  


  136 MMOL/L


(136-145)


 


Potassium Level


  


  4.1 MMOL/L


(3.5-5.1)


 


Chloride Level


  


  96 MMOL/L


()  L


 


Carbon Dioxide Level


  


  29 MMOL/L


(21-32)


 


Anion Gap


  


  12 mmol/L


(5-15)


 


Blood Urea Nitrogen


  


  39 mg/dL


(7-18)  H


 


Creatinine


  


  8.6 MG/DL


(0.55-1.30)  H


 


Estimat Glomerular Filtration


Rate 


  6.0 mL/min


(>60)


 


Glucose Level


  


  84 MG/DL


()


 


Calcium Level


  


  9.0 MG/DL


(8.5-10.1)











Current Medications








 Medications


  (Trade)  Dose


 Ordered  Sig/Eliazar


 Route


 PRN Reason  Start Time


 Stop Time Status Last Admin


Dose Admin


 


 Acetaminophen


  (Tylenol)  500 mg  Q6H  PRN


 ORAL


 Temp >100.5  4/15/20 01:00


 5/15/20 00:59  4/21/20 10:34


 


 


 Acetaminophen


  (Tylenol)  650 mg  Q6H  PRN


 ORAL


 For Pain  4/24/20 16:01


 5/24/20 16:00  4/24/20 16:28


 


 


 Aspirin


  (Ecotrin)  81 mg  DAILY


 ORAL


   4/26/20 09:00


 6/10/20 08:59  4/26/20 09:14


 


 


 Atorvastatin


 Calcium


  (Lipitor)  20 mg  BEDTIME


 ORAL


   4/26/20 21:00


 7/25/20 20:59   


 


 


 Cinacalcet


  (Sensipar)  30 mg  DAILY


 ORAL


   4/15/20 09:00


 7/14/20 08:59  4/26/20 09:14


 


 


 Dextrose


  (Dextrose 50%)  25 ml  Q30M  PRN


 IV


 Hypoglycemia  4/15/20 01:15


 7/14/20 01:14   


 


 


 Dextrose


  (Dextrose 50%)  50 ml  Q30M  PRN


 IV


 Hypoglycemia  4/15/20 01:15


 7/14/20 01:14   


 


 


 Enoxaparin Sodium


  (Lovenox)  30 mg  DAILY


 SUBQ


   4/21/20 09:00


 7/20/20 08:59  4/26/20 09:13


 


 


 Hydralazine HCl


  (Apresoline)  25 mg  Q4H  PRN


 ORAL


 bp over 160 syst  4/14/20 19:45


 7/13/20 19:44   


 


 


 Insulin Aspart


  (NovoLOG)    BEFORE MEALS AND  HS


 SUBQ


   4/15/20 06:30


 7/14/20 06:29  4/26/20 11:45


 


 


 Levofloxacin


  (Levaquin)  500 mg  Q48H


 ORAL


   4/26/20 09:00


 5/3/20 08:59  4/26/20 09:15


 


 


 Metoprolol


 Succinate


  (Toprol XL)  50 mg  DAILY


 ORAL


   4/26/20 09:00


 7/25/20 08:59  4/26/20 09:14


 


 


 Pantoprazole


  (Protonix)  40 mg  Q12HR


 ORAL


   4/14/20 21:00


 5/14/20 20:59  4/26/20 09:15


 


 


 Sevelamer


 Carbonate


  (Renvela)  800 mg  THREE TIMES A  DAY


 ORAL


   4/15/20 09:00


 7/14/20 08:59  4/26/20 11:57


 


 


 Vitamin B Complex/


 Vit C/Folic Acid


  (Nephrovite)  1 tab  DAILY


 ORAL


   4/15/20 09:00


 5/15/20 08:59  4/26/20 09:14


 

















Jake Pfeiffer MD Apr 26, 2020 12:34

## 2020-04-26 NOTE — NUR
NURSE NOTES:Handoff received from [facility] Alex.  Patient received sleeping in bed, no acute 
signs of distress noted. Patient is on room air, bed in the low and locked position, call 
light within reach, NO IV site, MD aware. Patient on contact and droplet precaution for 
Covid. Will continue to monitor patient.

## 2020-04-26 NOTE — CARDIOLOGY PROGRESS NOTE
Assessment/Plan


Assessment/Plan


1. Trigeminy ventricular premature depolarizations.  Continue metoprolol XL.


2. Pulmonary edema due to COVID-19 infection, chronic diastolic heart failure 

can contribute to some extent, continue with hemodialysis. Continue metoprolol.


3. History of diabetes mellitus.  Continue aspirin and atorvastatin.


4. History of hypertension, continue metoprolol.





Subjective


Subjective


Sinus rhythm at rate of 72.





Objective





Last 24 Hour Vital Signs








  Date Time  Temp Pulse Resp B/P (MAP) Pulse Ox O2 Delivery O2 Flow Rate FiO2


 


4/26/20 21:00      Nasal Cannula 2.0 


 


4/26/20 16:00 97.9 72 20 122/54 (76) 94   


 


4/26/20 16:00  71      


 


4/26/20 12:00 98.1 72 20 113/47 (69) 93   


 


4/26/20 12:00  69      


 


4/26/20 09:14  71  126/54    


 


4/26/20 09:00      Nasal Cannula 2.0 


 


4/26/20 08:00  70      


 


4/26/20 08:00 97.8 71 20 126/54 (78) 94   


 


4/26/20 04:00 97.3 72 20 131/55 (80) 92   


 


4/26/20 04:00  70      


 


4/26/20 00:00 97.9 74 19 128/59 (82) 93   


 


4/26/20 00:00  72      

















Intake and Output  


 


 4/25/20 4/26/20





 19:00 07:00


 


Intake Total 390 ml 120 ml


 


Balance 390 ml 120 ml


 


  


 


Intake Oral 390 ml 120 ml


 


# Voids  2


 


# Bowel Movements 1 











Laboratory Tests








Test


  4/26/20


06:40


 


White Blood Count


  12.7 K/UL


(4.8-10.8)  H


 


Red Blood Count


  3.30 M/UL


(4.70-6.10)  L


 


Hemoglobin


  9.2 G/DL


(14.2-18.0)  L


 


Hematocrit


  28.9 %


(42.0-52.0)  L


 


Mean Corpuscular Volume 88 FL (80-99)  


 


Mean Corpuscular Hemoglobin


  27.9 PG


(27.0-31.0)


 


Mean Corpuscular Hemoglobin


Concent 31.8 G/DL


(32.0-36.0)  L


 


Red Cell Distribution Width


  13.5 %


(11.6-14.8)


 


Platelet Count


  270 K/UL


(150-450)


 


Mean Platelet Volume


  7.1 FL


(6.5-10.1)


 


Neutrophils (%) (Auto)


  76.3 %


(45.0-75.0)  H


 


Lymphocytes (%) (Auto)


  14.7 %


(20.0-45.0)  L


 


Monocytes (%) (Auto)


  6.7 %


(1.0-10.0)


 


Eosinophils (%) (Auto)


  1.7 %


(0.0-3.0)


 


Basophils (%) (Auto)


  0.6 %


(0.0-2.0)


 


Sodium Level


  136 MMOL/L


(136-145)


 


Potassium Level


  4.1 MMOL/L


(3.5-5.1)


 


Chloride Level


  96 MMOL/L


()  L


 


Carbon Dioxide Level


  29 MMOL/L


(21-32)


 


Anion Gap


  12 mmol/L


(5-15)


 


Blood Urea Nitrogen


  39 mg/dL


(7-18)  H


 


Creatinine


  8.6 MG/DL


(0.55-1.30)  H


 


Estimat Glomerular Filtration


Rate 6.0 mL/min


(>60)


 


Glucose Level


  84 MG/DL


()


 


Calcium Level


  9.0 MG/DL


(8.5-10.1)








Objective


HEENT:  Normocephalic, anicteric, PERRLA, EOMI, Pink conjunctiva.


NECK: No JVD, no carotid bruit.


CVS:  Normal S1S2, regular rate and rhythm, no murmurs, gallops or rubs.


LUNGS:  Clear B/L


ABDOMEN:  Soft and nontender/nondistended, + BS.


EXTREMITIES:  No edema, clubbing or cyanosis.


NEUROLOGIC:  Awake, alert, verbal.  No focal weakness.











Mike Todd MD Apr 26, 2020 23:51

## 2020-04-26 NOTE — CONSULTATION
DATE OF CONSULTATION:  04/26/2020

PAIN MANAGEMENT CONSULTATION



CONSULTING PHYSICIAN:  Behnoush Zarrini, M.D.



REFERRING PHYSICIAN:  Van Silva M.D.



PHYSICIAN ASSISTANT:  PARI Collazo



CHIEF COMPLAINT:  Abdominal pain.



HISTORY OF PRESENT ILLNESS:  This is a 84-year-old male, who is being seen

on the telemetry floor of [facility] for initial pain

management consultation.  The patient was admitted under the care of Dr. Silva on 04/16/2020 with complaints of shortness of breath and upper

respiratory symptoms and has been found to be positive for COVID-19, has

been seen by pulmonologist as well as an Infectious Diseases doctor at

this time, now complaining of no symptoms of shortness of breath or upper

respiratory symptoms; however, he does have end-stage renal disease and

has gotten dialysis causing abdominal pain.  He was seen by

gastroenterologist who does not know the etiology of the abdominal pain.

However at this time, the patient denies any pain.  He is comfortable and

has been getting Tylenol as needed 650 mg tablet every 6 hours as needed

for pain, last received on 04/24/2020 on the day of his dialysis. At this

time, we were consulted so the patient would have adequate pain control

while here in the hospital.  Again the patient is comfortable and denies

any pain at this time.



PAST MEDICAL HISTORY:  End-stage renal disease on hemodialysis, COPD,

hypertension, diabetes mellitus, seizure disorder.



PAST SURGICAL HISTORY:  Catheter placement for hemodialysis.



SOCIAL HISTORY:  Denies smoking tobacco, drinking alcohol, or IV drug

abuse.



ALLERGIES:  No known drug allergies.



MEDICATIONS:  Keflex, Sensipar, Normodyne, Renvela, Nephro-Makenzie.



REVIEW OF SYSTEMS:  Denies rash, fever, chills, sweating, dizziness,

drowsiness, blurred vision, ______ .  No shortness of breath or chest

pain.  No nausea, vomiting, diarrhea, blood in the stool or urine.  No

dysuria.



PHYSICAL EXAMINATION:

GENERAL:  Alert, awake, and oriented.

VITAL SIGNS:  Blood pressure 113/47, heart rate 72, oxygen saturation 93%,

respiratory rate 20, and temperature 98.1 degrees Fahrenheit.

HEENT:  PERRLA.

NECK:  Range of motion is full in all directions.  No tenderness to

paracervical muscles.  No adenopathy.

LUNGS:  Decreased breath sounds bilaterally.

HEART:  S1 and S2 regular.

ABDOMEN:  Obese.

BACK:  Range of motion is decreased in flexion and extension.

EXTREMITIES:  Upper and lower extremity range of motion is decreased due to

the patient's condition.  No cyanosis.  No clubbing.  Sensory is reduced.

Reflexes are not obtainable.  No adenopathy.



ASSESSMENT AND PLAN:  This is a 84-year-old male with diabetes mellitus,

end-stage renal disease on hemodialysis, degenerative joint disease,

COVID-19 positive.  At this time, the patient will be continued on Tylenol

as needed 650 mg tablet every six hours.  The patient is being seen by

gastroenterologist for cause of abdominal pain; however again patient is

denying any pain at this time.  The patient was discussed with Dr. Mendes

and he concurred.  We will follow the patient.



Thank you very much for the courtesy of this consultation.







  ______________________________________________

  Behnoush Zarrini, M.D.





  ______________________________________________

  PARI Collazo





DR:  Elisa

D:  04/26/2020 14:54

T:  04/26/2020 20:26

JOB#:  [id number]

CC:

## 2020-04-26 NOTE — HEMATOLOGY/ONC PROGRESS NOTE
Assessment/Plan


Assessment/Plan


Asses/Recs


# Elevated D-dimer on admission, with sob, in this case r/o dvt of lower ext


--> less likely pe, first r/o dvt given more likely viral cause possible


--> obtain duplex of lower ext--> NEG FOR pe


--> repeat in future in 3mo


# Anemia of chronic disease due to underlying chronic medical issues, 

multifactorial v Gi bleed 


--> Anemia workup has been ordered, rule out gi bleed 


--> No evidence of hemolysis is noted, peripheral smear has been reviewed.


--> Hgb goal >7. Transfuse prn.


--> Epogen or iron at this time is not particularly indicated


--> Medications have been reviewed


--> hgb trend: 9.8 -->10-->10.8-->10.3-->9.6


# Leukocytosis r/o infection


--> as per id


--> on levoflox


# Dyspnea


--> appears likely covid related


--> pulm aware


# ESRD (end stage renal disease)


--> hd as per Dr. Morrison


# Seizures.


# Hypertension.


# Diabetes mellitus.


# Dvt ppx lovenox sq





The timing of this note does not necessarily reflect the time of the patient 

was seen.





Greatly appreciate consultation.





Subjective


HEENT:  Denies: no symptoms, eye pain, blurred vision, tearing, double vision, 

ear pain, ear discharge, nose pain, nose congestion, throat pain, throat 

swelling, mouth pain, mouth swelling, other


Cardiovascular:  Denies: no symptoms, chest pain, edema, irregular heart rate, 

lightheadedness, palpitations, syncope, other


Respiratory:  Denies: no symptoms, cough, shortness of breath, SOB with 

excertion, SOB at rest, sputum, wheezing, other


Gastrointestinal/Abdominal:  Denies: no symptoms, abdomen distended, abdominal 

pain, black stools, tarry stools, blood in stool, constipated, diarrhea, 

difficulty swallowing, nausea, poor appetite, poor fluid intake, rectal bleeding

, vomiting, other


Genitourinary:  Denies: no symptoms, burning, discharge, frequency, flank pain, 

hematuria, incontinence, pain, urgency, other


Neurologic/Psychiatric:  Denies: no symptoms, anxiety, depressed, emotional 

problems, headache, numbness, paresthesia, pre-existing deficit, seizure, 

tingling, tremors, weakness, other


Endocrine:  Denies: no symptoms, excessive sweating, flushing, intolerance to 

cold, intolerance to heat, increased hunger, increased thirst, increased urine, 

unexplained weight gain, unexplained weight loss, other


Allergies:  


Coded Allergies:  


     No Known Allergies (Unverified , 4/16/13)


Subjective


4/16 no bleeding , labs noted, hd as needed, renal aware


4/17 on tele, no acute events, nc 2l, sodium 135, hd for today 


4/19 eating breakfast, no overnight events, meds reviewed 


4/20 no events, no bleeding, no fc, no night sweats, hgb 10, hd today


4/21 no major changes, remains lucid, no bleeding, labs noted


4/22 labs noted, difficult stick, labs ordered, no bleeding


4/23 labs have been reviewed, no bleeding, smear is noted, no bleeding


4/24 no bleeding, no events, labs reviewed, labs noted, Polish speaking


4/26 no major changes, no bleeding, labs noted, hgb is 9.6, no hemolysis, on 2l 

nc





Objective


Objective





Current Medications








 Medications


  (Trade)  Dose


 Ordered  Sig/Eliazar


 Route


 PRN Reason  Start Time


 Stop Time Status Last Admin


Dose Admin


 


 Acetaminophen


  (Tylenol)  500 mg  Q6H  PRN


 ORAL


 Temp >100.5  4/15/20 01:00


 5/15/20 00:59  4/21/20 10:34


 


 


 Acetaminophen


  (Tylenol)  650 mg  Q6H  PRN


 ORAL


 For Pain  4/24/20 16:01


 5/24/20 16:00  4/24/20 16:28


 


 


 Aspirin


  (Ecotrin)  81 mg  DAILY


 ORAL


   4/26/20 09:00


 6/10/20 08:59   


 


 


 Atorvastatin


 Calcium


  (Lipitor)  20 mg  BEDTIME


 ORAL


   4/26/20 21:00


 7/25/20 20:59   


 


 


 Cinacalcet


  (Sensipar)  30 mg  DAILY


 ORAL


   4/15/20 09:00


 7/14/20 08:59  4/25/20 08:55


 


 


 Dextrose


  (Dextrose 50%)  25 ml  Q30M  PRN


 IV


 Hypoglycemia  4/15/20 01:15


 7/14/20 01:14   


 


 


 Dextrose


  (Dextrose 50%)  50 ml  Q30M  PRN


 IV


 Hypoglycemia  4/15/20 01:15


 7/14/20 01:14   


 


 


 Enoxaparin Sodium


  (Lovenox)  30 mg  DAILY


 SUBQ


   4/21/20 09:00


 7/20/20 08:59  4/25/20 08:58


 


 


 Hydralazine HCl


  (Apresoline)  25 mg  Q4H  PRN


 ORAL


 bp over 160 syst  4/14/20 19:45


 7/13/20 19:44   


 


 


 Insulin Aspart


  (NovoLOG)    BEFORE MEALS AND  HS


 SUBQ


   4/15/20 06:30


 7/14/20 06:29  4/25/20 16:58


 


 


 Levofloxacin


  (Levaquin)  500 mg  Q48H


 ORAL


   4/26/20 09:00


 5/3/20 08:59   


 


 


 Metoprolol


 Succinate


  (Toprol XL)  50 mg  DAILY


 ORAL


   4/26/20 09:00


 7/25/20 08:59   


 


 


 Pantoprazole


  (Protonix)  40 mg  Q12HR


 ORAL


   4/14/20 21:00


 5/14/20 20:59  4/25/20 21:00


 


 


 Sevelamer


 Carbonate


  (Renvela)  800 mg  THREE TIMES A  DAY


 ORAL


   4/15/20 09:00


 7/14/20 08:59  4/25/20 16:59


 


 


 Vitamin B Complex/


 Vit C/Folic Acid


  (Nephrovite)  1 tab  DAILY


 ORAL


   4/15/20 09:00


 5/15/20 08:59  4/25/20 08:55


 











Last 24 Hour Vital Signs








  Date Time  Temp Pulse Resp B/P (MAP) Pulse Ox O2 Delivery O2 Flow Rate FiO2


 


4/26/20 00:00 97.9 74 19 128/59 (82) 93   


 


4/26/20 00:00  72      


 


4/25/20 21:00      Nasal Cannula 2.0 


 


4/25/20 20:00 97.3 70 21 121/61 (81) 94   


 


4/25/20 20:00  73      


 


4/25/20 16:00 98.8 100 20 100/56 (71) 96   


 


4/25/20 16:00  92      


 


4/25/20 12:00  72      


 


4/25/20 12:00 97.0 80 20 112/58 (76) 98   


 


4/25/20 09:00      Nasal Cannula 2.0 


 


4/25/20 08:55  77  99/51    


 


4/25/20 08:00 98.0 77 20 99/51 (67) 95   


 


4/25/20 08:00  78      


 


4/25/20 04:00  86      


 


4/25/20 04:00 98.0 72 16 120/74 (89) 98   


 


4/25/20 00:00 98.2 80 18 126/76 (93) 96   


 


4/25/20 00:00  82      


 


4/24/20 21:00      Nasal Cannula 2.0 


 


4/24/20 20:00  102      


 


4/24/20 20:00 97.9 100 16 127/76 (93) 97   


 


4/24/20 18:02 97.6       


 


4/24/20 16:15  111      


 


4/24/20 16:00 98.0 99 18 132/79 (96) 98   


 


4/24/20 12:00 97.6 72 18 115/59 (77) 96   


 


4/24/20 11:45  75      


 


4/24/20 09:36  74  109/78    


 


4/24/20 08:00 98.8 78 18 112/63 (79) 95   


 


4/24/20 07:48      Nasal Cannula 2.0 


 


4/24/20 07:46  85      

















Intake and Output  


 


 4/25/20 4/26/20





 19:00 07:00


 


Intake Total 390 ml 


 


Balance 390 ml 


 


  


 


Intake Oral 390 ml 


 


# Bowel Movements 1 











Labs








Test


  4/23/20


08:40 4/24/20


06:50 4/25/20


17:00


 


White Blood Count


  12.4 K/UL


(4.8-10.8) 12.9 K/UL


(4.8-10.8) 


 


 


Red Blood Count


  3.59 M/UL


(4.70-6.10) 3.39 M/UL


(4.70-6.10) 


 


 


Hemoglobin


  10.4 G/DL


(14.2-18.0) 9.6 G/DL


(14.2-18.0) 


 


 


Hematocrit


  31.5 %


(42.0-52.0) 29.2 %


(42.0-52.0) 


 


 


Mean Corpuscular Volume 88 FL (80-99)  86 FL (80-99)  


 


Mean Corpuscular Hemoglobin


  29.0 PG


(27.0-31.0) 28.4 PG


(27.0-31.0) 


 


 


Mean Corpuscular Hemoglobin


Concent 33.0 G/DL


(32.0-36.0) 33.0 G/DL


(32.0-36.0) 


 


 


Red Cell Distribution Width


  13.4 %


(11.6-14.8) 13.1 %


(11.6-14.8) 


 


 


Platelet Count


  220 K/UL


(150-450) 254 K/UL


(150-450) 


 


 


Mean Platelet Volume


  7.6 FL


(6.5-10.1) 7.4 FL


(6.5-10.1) 


 


 


Neutrophils (%) (Auto)


  79.0 %


(45.0-75.0) 75.2 %


(45.0-75.0) 


 


 


Lymphocytes (%) (Auto)


  11.3 %


(20.0-45.0) 12.4 %


(20.0-45.0) 


 


 


Monocytes (%) (Auto)


  8.6 %


(1.0-10.0) 9.8 %


(1.0-10.0) 


 


 


Eosinophils (%) (Auto)


  0.6 %


(0.0-3.0) 1.8 %


(0.0-3.0) 


 


 


Basophils (%) (Auto)


  0.5 %


(0.0-2.0) 0.8 %


(0.0-2.0) 


 


 


Sodium Level


  


  133 MMOL/L


(136-145) 


 


 


Potassium Level


  


  4.2 MMOL/L


(3.5-5.1) 


 


 


Chloride Level


  


  95 MMOL/L


() 


 


 


Carbon Dioxide Level


  


  26 MMOL/L


(21-32) 


 


 


Anion Gap


  


  12 mmol/L


(5-15) 


 


 


Blood Urea Nitrogen


  


  45 mg/dL


(7-18) 


 


 


Creatinine


  


  9.3 MG/DL


(0.55-1.30) 


 


 


Estimat Glomerular Filtration


Rate 


  5.5 mL/min


(>60) 


 


 


Glucose Level


  


  99 MG/DL


() 


 


 


Calcium Level


  


  8.7 MG/DL


(8.5-10.1) 


 


 


Phosphorus Level


  


  3.3 MG/DL


(2.5-4.9) 


 


 


Total Bilirubin


  


  0.6 MG/DL


(0.2-1.0) 


 


 


Aspartate Amino Transf


(AST/SGOT) 


  25 U/L (15-37) 


  


 


 


Alanine Aminotransferase


(ALT/SGPT) 


  36 U/L (12-78) 


  


 


 


Alkaline Phosphatase


  


  110 U/L


() 


 


 


C-Reactive Protein,


Quantitative 


  4.0 mg/dL


(0.00-0.90) 


 


 


Total Protein


  


  7.3 G/DL


(6.4-8.2) 


 


 


Albumin


  


  2.5 G/DL


(3.4-5.0) 


 


 


Globulin  4.8 g/dL  


 


Albumin/Globulin Ratio  0.5 (1.0-2.7)  








Height (Feet):  5


Height (Inches):  5.00


Weight (Pounds):  150


Objective





Physical Exam


Vitals: reviewed


General: NAD


HEENT: nc, at


Neck: supple


Chest: clear breath sounds bilaterally, 2lnc


Cardiovascular: RRR, no s3, s4


Neuro: alert and oriented











Mt Staley MD Apr 26, 2020 06:37

## 2020-04-26 NOTE — NUR
ASSESSMENT COMPLETED. NO ACUTE DISTRESS, DENIES CHEST PAIN, RESPIRATORY DISTRESS, NO LABORED 
BREATHING, BREATHING EASY ROOM AIR. 92% SAT. HD FOR TOMORROW. ACCU CHECK 100. NO INSULIN 
GIVEN. MADE READY FOR BED. RESTING COMFORTABLY WITHOUT COMPLAINT. MONITOR FOR NEEDS AND 
CHANGES.

## 2020-04-26 NOTE — PULMONOLOGY PROGRESS NOTE
Assessment/Plan


Assessment/Plan


IMPRESSION:


1. History of COVID-19 exposure. Is + COVID 19 here


2. ESRD, on dialysis.


3. Diabetes mellitus.


4. Dyspnea.





DISCUSSION:  





Consider use of Plaquenil or azithromycin only if patient becomes hypoxic.


Currently SaO2 92-95% on RA alternating with 2L/min O2


Currently, he is doing well.  I would avoid unnecessary antibiotics.


Agree with ongoing dialysis.  I will follow carefully.














  ______________________________________________


  Oh Solomon M.D.





Subjective


ROS Limited/Unobtainable:  Yes


Interval Events:  + COVID 19; no new complaints


Constitutional:  Denies: fever


HEENT:  Repors: no symptoms


Respiratory:  Reports: no symptoms


Cardiovascular:  Reports: no symptoms


Gastrointestinal/Abdominal:  Reports: no symptoms


Allergies:  


Coded Allergies:  


     No Known Allergies (Unverified , 4/16/13)





Objective





Last 24 Hour Vital Signs








  Date Time  Temp Pulse Resp B/P (MAP) Pulse Ox O2 Delivery O2 Flow Rate FiO2


 


4/26/20 09:14  71  126/54    


 


4/26/20 09:00      Nasal Cannula 2.0 


 


4/26/20 08:00  70      


 


4/26/20 08:00 97.8 71 20 126/54 (78) 94   


 


4/26/20 04:00 97.3 72 20 131/55 (80) 92   


 


4/26/20 04:00  70      


 


4/26/20 00:00 97.9 74 19 128/59 (82) 93   


 


4/26/20 00:00  72      


 


4/25/20 21:00      Nasal Cannula 2.0 


 


4/25/20 20:00 97.3 70 21 121/61 (81) 94   


 


4/25/20 20:00  73      


 


4/25/20 16:00 98.8 100 20 100/56 (71) 96   


 


4/25/20 16:00  92      

















Intake and Output  


 


 4/25/20 4/26/20





 19:00 07:00


 


Intake Total 390 ml 120 ml


 


Balance 390 ml 120 ml


 


  


 


Intake Oral 390 ml 120 ml


 


# Voids  2


 


# Bowel Movements 1 








General Appearance:  no acute distress


HEENT:  mucous membranes moist


Respiratory/Chest:  chest wall non-tender, lungs clear


Cardiovascular:  normal peripheral pulses


Abdomen:  soft, non tender


Extremities:  no edema


Neurologic/Psychiatric:  alert, responsive





Microbiology








 Date/Time


Source Procedure


Growth Status


 


 


 4/23/20 13:10


Nasopharynx Coronavirus COVID-19 PCR ([facility]) - Final Complete








Laboratory Tests


4/25/20 17:00: Stool Occult Blood [Pending]


4/26/20 06:40: 


White Blood Count 12.7H, Red Blood Count 3.30L, Hemoglobin 9.2L, Hematocrit 

28.9L, Mean Corpuscular Volume 88, Mean Corpuscular Hemoglobin 27.9, Mean 

Corpuscular Hemoglobin Concent 31.8L, Red Cell Distribution Width 13.5, 

Platelet Count 270, Mean Platelet Volume 7.1, Neutrophils (%) (Auto) 76.3H, 

Lymphocytes (%) (Auto) 14.7L, Monocytes (%) (Auto) 6.7, Eosinophils (%) (Auto) 

1.7, Basophils (%) (Auto) 0.6, Sodium Level 136, Potassium Level 4.1, Chloride 

Level 96L, Carbon Dioxide Level 29, Anion Gap 12, Blood Urea Nitrogen 39H, 

Creatinine 8.6H, Estimat Glomerular Filtration Rate 6.0, Glucose Level 84, 

Calcium Level 9.0





Current Medications








 Medications


  (Trade)  Dose


 Ordered  Sig/Eliazar


 Route


 PRN Reason  Start Time


 Stop Time Status Last Admin


Dose Admin


 


 Acetaminophen


  (Tylenol)  500 mg  Q6H  PRN


 ORAL


 Temp >100.5  4/15/20 01:00


 5/15/20 00:59  4/21/20 10:34


 


 


 Acetaminophen


  (Tylenol)  650 mg  Q6H  PRN


 ORAL


 For Pain  4/24/20 16:01


 5/24/20 16:00  4/24/20 16:28


 


 


 Aspirin


  (Ecotrin)  81 mg  DAILY


 ORAL


   4/26/20 09:00


 6/10/20 08:59  4/26/20 09:14


 


 


 Atorvastatin


 Calcium


  (Lipitor)  20 mg  BEDTIME


 ORAL


   4/26/20 21:00


 7/25/20 20:59   


 


 


 Cinacalcet


  (Sensipar)  30 mg  DAILY


 ORAL


   4/15/20 09:00


 7/14/20 08:59  4/26/20 09:14


 


 


 Dextrose


  (Dextrose 50%)  25 ml  Q30M  PRN


 IV


 Hypoglycemia  4/15/20 01:15


 7/14/20 01:14   


 


 


 Dextrose


  (Dextrose 50%)  50 ml  Q30M  PRN


 IV


 Hypoglycemia  4/15/20 01:15


 7/14/20 01:14   


 


 


 Enoxaparin Sodium


  (Lovenox)  30 mg  DAILY


 SUBQ


   4/21/20 09:00


 7/20/20 08:59  4/26/20 09:13


 


 


 Hydralazine HCl


  (Apresoline)  25 mg  Q4H  PRN


 ORAL


 bp over 160 syst  4/14/20 19:45


 7/13/20 19:44   


 


 


 Insulin Aspart


  (NovoLOG)    BEFORE MEALS AND  HS


 SUBQ


   4/15/20 06:30


 7/14/20 06:29  4/26/20 11:45


 


 


 Levofloxacin


  (Levaquin)  500 mg  EVERY OTHER  DAY


 ORAL


   4/28/20 09:00


 5/5/20 08:59 UNV  


 


 


 Levofloxacin


  (Levaquin)  500 mg  Q48H


 ORAL


   4/26/20 09:00


 5/3/20 08:59  4/26/20 09:15


 


 


 Metoprolol


 Succinate


  (Toprol XL)  50 mg  DAILY


 ORAL


   4/26/20 09:00


 7/25/20 08:59  4/26/20 09:14


 


 


 Pantoprazole


  (Protonix)  40 mg  Q12HR


 ORAL


   4/14/20 21:00


 5/14/20 20:59  4/26/20 09:15


 


 


 Sevelamer


 Carbonate


  (Renvela)  800 mg  THREE TIMES A  DAY


 ORAL


   4/15/20 09:00


 7/14/20 08:59  4/26/20 11:57


 


 


 Vitamin B Complex/


 Vit C/Folic Acid


  (Nephrovite)  1 tab  DAILY


 ORAL


   4/15/20 09:00


 5/15/20 08:59  4/26/20 09:14


 

















Oh Solomon MD Apr 26, 2020 12:45

## 2020-04-26 NOTE — NUR
NURSE NOTES:Called [facility] and spoke to Beto. Appointment confirmed for tomorrow 
04/27/20 daytime.

## 2020-04-26 NOTE — NEPHROLOGY PROGRESS NOTE
Assessment/Plan


Problem List:  


(1) ESRD (end stage renal disease)


(2) Suspected COVID-19 virus infection


(3) Hyperkalemia


(4) COVID-19 virus infection


Assessment





End-stage renal disease on hemodialysis


Has arm fistula for dialysis-Next dialysis Wednesday, April 15


Dyspnea and shortness of breath, history of COPD, oxygen dependent at home


Exposure to COVID 19


Diabetes mellitus


Hypertension


Remote history of seizure


Anemia of kidney disease


Plan





COVID 19 test detected





Hemodialysis  April 24 next April 27


Keep the blood pressure and blood sugar in check


2D echocardiogram pending


Antibiotics and pulmonary support per consultants





Chest x-ray:


Findings: There is a left chest  HERO catheter, tip projected at the level of 

the


superior vena cava. Some atelectatic bands are seen in the left midlung. The 

lungs


and pleural spaces are otherwise clear. The heart size is upper limits of 

normal. A


stent is seen in the left axilla


Impression: Left midlung atelectasis. No acute process otherwise





Subjective


ROS Limited/Unobtainable:  No


Constitutional:  Reports: malaise





Objective


Objective





Last 24 Hour Vital Signs








  Date Time  Temp Pulse Resp B/P (MAP) Pulse Ox O2 Delivery O2 Flow Rate FiO2


 


4/26/20 12:00 98.1 72 20 113/47 (69) 93   


 


4/26/20 12:00  69      


 


4/26/20 09:14  71  126/54    


 


4/26/20 09:00      Nasal Cannula 2.0 


 


4/26/20 08:00  70      


 


4/26/20 08:00 97.8 71 20 126/54 (78) 94   


 


4/26/20 04:00 97.3 72 20 131/55 (80) 92   


 


4/26/20 04:00  70      


 


4/26/20 00:00 97.9 74 19 128/59 (82) 93   


 


4/26/20 00:00  72      


 


4/25/20 21:00      Nasal Cannula 2.0 


 


4/25/20 20:00 97.3 70 21 121/61 (81) 94   


 


4/25/20 20:00  73      


 


4/25/20 16:00 98.8 100 20 100/56 (71) 96   


 


4/25/20 16:00  92      

















Intake and Output  


 


 4/25/20 4/26/20





 19:00 07:00


 


Intake Total 390 ml 120 ml


 


Balance 390 ml 120 ml


 


  


 


Intake Oral 390 ml 120 ml


 


# Voids  2


 


# Bowel Movements 1 








Laboratory Tests


4/25/20 17:00: Stool Occult Blood [Pending]


4/26/20 06:40: 


White Blood Count 12.7H, Red Blood Count 3.30L, Hemoglobin 9.2L, Hematocrit 

28.9L, Mean Corpuscular Volume 88, Mean Corpuscular Hemoglobin 27.9, Mean 

Corpuscular Hemoglobin Concent 31.8L, Red Cell Distribution Width 13.5, 

Platelet Count 270, Mean Platelet Volume 7.1, Neutrophils (%) (Auto) 76.3H, 

Lymphocytes (%) (Auto) 14.7L, Monocytes (%) (Auto) 6.7, Eosinophils (%) (Auto) 

1.7, Basophils (%) (Auto) 0.6, Sodium Level 136, Potassium Level 4.1, Chloride 

Level 96L, Carbon Dioxide Level 29, Anion Gap 12, Blood Urea Nitrogen 39H, 

Creatinine 8.6H, Estimat Glomerular Filtration Rate 6.0, Glucose Level 84, 

Calcium Level 9.0


Height (Feet):  5


Height (Inches):  5.00


Weight (Pounds):  150


General Appearance:  no apparent distress


Objective


no change











Vik Morrison MD Apr 26, 2020 14:10

## 2020-04-26 NOTE — GI PROGRESS NOTE
Assessment/Plan


Problems:  


(1) Abdominal pain


ICD Codes:  R10.9 - Unspecified abdominal pain


SNOMED:  86857855


(2) COVID-19 virus infection


ICD Codes:  U07.1 - COVID-19


SNOMED:  319620298


(3) Suspected COVID-19 virus infection


ICD Codes:  R68.89 - Other general symptoms and signs


SNOMED:  230490699


(4) ESRD (end stage renal disease)


ICD Codes:  N18.6 - End stage renal disease


SNOMED:  14983184


(5) Dyspnea


ICD Codes:  R06.00 - Dyspnea, unspecified


SNOMED:  202328717


Status:  unchanged


Status Narrative


Discussed with Dr. Kessler.


Assessment/Plan


Abdominal Pain unknown etiology.  Given that the patient is positive for COVID-

19, the patient may demonstrate gastrointestinal symptoms such as diarrhea, 

nausea or vomiting in which none are present at this time.  Less likely biliary 

colic or cholecystitis given normal liver enzymes.  At time of evaluation, 

patient denies any abdominal pain.  However, family reports patient is having 

back pain.


-We will obtain KUB to evaluate for constipation given last documented bowel 

movement on April 23, 2020 >> unremarkable.


-PT eval


-Tylenol prn for pain





Anemia most likely secondary to end-stage renal disease.


-Obtain occult blood stool to evaluate for any GI bleed


- no plans for GI procedures unless emergent


-Monitor H&H, PRN transfusions


-PPI p.o. daily





The patient was seen and examined at bedside and all new and available data was 

reviewed in the patients chart. I agree with the above findings, impression 

and plan.  (Patient seen earlier today. Signature stamp does not reflect 

patient encounter time.). - Darrion Kessler MD





Subjective


Subjective


denies any abdominal pain





Objective





Last 24 Hour Vital Signs








  Date Time  Temp Pulse Resp B/P (MAP) Pulse Ox O2 Delivery O2 Flow Rate FiO2


 


4/26/20 12:00 98.1 72 20 113/47 (69) 93   


 


4/26/20 12:00  69      


 


4/26/20 09:14  71  126/54    


 


4/26/20 09:00      Nasal Cannula 2.0 


 


4/26/20 08:00  70      


 


4/26/20 08:00 97.8 71 20 126/54 (78) 94   


 


4/26/20 04:00 97.3 72 20 131/55 (80) 92   


 


4/26/20 04:00  70      


 


4/26/20 00:00 97.9 74 19 128/59 (82) 93   


 


4/26/20 00:00  72      


 


4/25/20 21:00      Nasal Cannula 2.0 


 


4/25/20 20:00 97.3 70 21 121/61 (81) 94   


 


4/25/20 20:00  73      


 


4/25/20 16:00 98.8 100 20 100/56 (71) 96   


 


4/25/20 16:00  92      

















Intake and Output  


 


 4/25/20 4/26/20





 19:00 07:00


 


Intake Total 390 ml 120 ml


 


Balance 390 ml 120 ml


 


  


 


Intake Oral 390 ml 120 ml


 


# Voids  2


 


# Bowel Movements 1 











Laboratory Tests








Test


  4/25/20


17:00 4/26/20


06:40


 


Stool Occult Blood Pending   


 


White Blood Count


  


  12.7 K/UL


(4.8-10.8)  H


 


Red Blood Count


  


  3.30 M/UL


(4.70-6.10)  L


 


Hemoglobin


  


  9.2 G/DL


(14.2-18.0)  L


 


Hematocrit


  


  28.9 %


(42.0-52.0)  L


 


Mean Corpuscular Volume  88 FL (80-99)  


 


Mean Corpuscular Hemoglobin


  


  27.9 PG


(27.0-31.0)


 


Mean Corpuscular Hemoglobin


Concent 


  31.8 G/DL


(32.0-36.0)  L


 


Red Cell Distribution Width


  


  13.5 %


(11.6-14.8)


 


Platelet Count


  


  270 K/UL


(150-450)


 


Mean Platelet Volume


  


  7.1 FL


(6.5-10.1)


 


Neutrophils (%) (Auto)


  


  76.3 %


(45.0-75.0)  H


 


Lymphocytes (%) (Auto)


  


  14.7 %


(20.0-45.0)  L


 


Monocytes (%) (Auto)


  


  6.7 %


(1.0-10.0)


 


Eosinophils (%) (Auto)


  


  1.7 %


(0.0-3.0)


 


Basophils (%) (Auto)


  


  0.6 %


(0.0-2.0)


 


Sodium Level


  


  136 MMOL/L


(136-145)


 


Potassium Level


  


  4.1 MMOL/L


(3.5-5.1)


 


Chloride Level


  


  96 MMOL/L


()  L


 


Carbon Dioxide Level


  


  29 MMOL/L


(21-32)


 


Anion Gap


  


  12 mmol/L


(5-15)


 


Blood Urea Nitrogen


  


  39 mg/dL


(7-18)  H


 


Creatinine


  


  8.6 MG/DL


(0.55-1.30)  H


 


Estimat Glomerular Filtration


Rate 


  6.0 mL/min


(>60)


 


Glucose Level


  


  84 MG/DL


()


 


Calcium Level


  


  9.0 MG/DL


(8.5-10.1)








Height (Feet):  5


Height (Inches):  5.00


Weight (Pounds):  150


General Appearance:  WD/WN, no apparent distress, alert


Cardiovascular:  normal rate


Respiratory/Chest:  normal breath sounds, no respiratory distress


Abdominal Exam:  normal bowel sounds, non tender, soft


Extremities:  non-tender











Casa Enriquez NP Apr 26, 2020 13:04

## 2020-04-26 NOTE — GENERAL PROGRESS NOTE
Assessment/Plan


Problem List:  


(1) Hyperkalemia


ICD Codes:  E87.5 - Hyperkalemia


SNOMED:  51159603


(2) ESRD (end stage renal disease)


ICD Codes:  N18.6 - End stage renal disease


SNOMED:  23084447


(3) Suspected COVID-19 virus infection


ICD Codes:  R68.89 - Other general symptoms and signs


SNOMED:  785840537


Status:  progressing, unchanged


Assessment/Plan:


covid positive


htn


esrd on hd


pna


sepsis


anemia of chronic renal diseae


continue w supportive rx





Subjective


ROS Limited/Unobtainable:  Yes


Allergies:  


Coded Allergies:  


     No Known Allergies (Unverified , 4/16/13)





Objective





Last 24 Hour Vital Signs








  Date Time  Temp Pulse Resp B/P (MAP) Pulse Ox O2 Delivery O2 Flow Rate FiO2


 


4/26/20 21:00      Nasal Cannula 2.0 


 


4/26/20 16:00 97.9 72 20 122/54 (76) 94   


 


4/26/20 16:00  71      


 


4/26/20 12:00 98.1 72 20 113/47 (69) 93   


 


4/26/20 12:00  69      


 


4/26/20 09:14  71  126/54    


 


4/26/20 09:00      Nasal Cannula 2.0 


 


4/26/20 08:00  70      


 


4/26/20 08:00 97.8 71 20 126/54 (78) 94   


 


4/26/20 04:00 97.3 72 20 131/55 (80) 92   


 


4/26/20 04:00  70      


 


4/26/20 00:00 97.9 74 19 128/59 (82) 93   


 


4/26/20 00:00  72      

















Intake and Output  


 


 4/25/20 4/26/20





 19:00 07:00


 


Intake Total 390 ml 120 ml


 


Balance 390 ml 120 ml


 


  


 


Intake Oral 390 ml 120 ml


 


# Voids  2


 


# Bowel Movements 1 








Laboratory Tests


4/26/20 06:40: 


White Blood Count 12.7H, Red Blood Count 3.30L, Hemoglobin 9.2L, Hematocrit 

28.9L, Mean Corpuscular Volume 88, Mean Corpuscular Hemoglobin 27.9, Mean 

Corpuscular Hemoglobin Concent 31.8L, Red Cell Distribution Width 13.5, 

Platelet Count 270, Mean Platelet Volume 7.1, Neutrophils (%) (Auto) 76.3H, 

Lymphocytes (%) (Auto) 14.7L, Monocytes (%) (Auto) 6.7, Eosinophils (%) (Auto) 

1.7, Basophils (%) (Auto) 0.6, Sodium Level 136, Potassium Level 4.1, Chloride 

Level 96L, Carbon Dioxide Level 29, Anion Gap 12, Blood Urea Nitrogen 39H, 

Creatinine 8.6H, Estimat Glomerular Filtration Rate 6.0, Glucose Level 84, 

Calcium Level 9.0


Height (Feet):  5


Height (Inches):  5.00


Weight (Pounds):  150











Van Silva MD Apr 26, 2020 22:18

## 2020-04-26 NOTE — CONSULTATION
History of Present Illness


General


Date patient seen:  Apr 26, 2020


Chief Complaint:  


Referring physician:  


Reason for Consultation:  





Present Illness


Allergies:  


Coded Allergies:  


     No Known Allergies (Unverified , 4/16/13)





Medication History


Scheduled


Cephalexin* (Keflex*), 250 MG PO Q6HR


Cinacalcet* (Sensipar*), 30 MG ORAL DAILY, (Reported)


Labetalol Hcl* (Normodyne*), 100 MG ORAL DAILY, (Reported)


Sevelamer Carbonate* (Renvela*), 800 MG ORAL THREE TIMES A DAY, (Reported)


Vitamin B Cmplx/Vit C/Folic AC (Nephro-Makenzie Tablet), 1 TAB ORAL DAILY, (Reported

)





Patient History


Healthcare decision maker





Resuscitation status





Advanced Directive on File








Physical Exam





Last 24 Hour Vital Signs








  Date Time  Temp Pulse Resp B/P (MAP) Pulse Ox O2 Delivery O2 Flow Rate FiO2


 


4/26/20 12:00 98.1 72 20 113/47 (69) 93   


 


4/26/20 12:00  69      


 


4/26/20 09:14  71  126/54    


 


4/26/20 09:00      Nasal Cannula 2.0 


 


4/26/20 08:00  70      


 


4/26/20 08:00 97.8 71 20 126/54 (78) 94   


 


4/26/20 04:00 97.3 72 20 131/55 (80) 92   


 


4/26/20 04:00  70      


 


4/26/20 00:00 97.9 74 19 128/59 (82) 93   


 


4/26/20 00:00  72      


 


4/25/20 21:00      Nasal Cannula 2.0 


 


4/25/20 20:00 97.3 70 21 121/61 (81) 94   


 


4/25/20 20:00  73      


 


4/25/20 16:00 98.8 100 20 100/56 (71) 96   


 


4/25/20 16:00  92      

















Intake and Output  


 


 4/25/20 4/26/20





 19:00 07:00


 


Intake Total 390 ml 120 ml


 


Balance 390 ml 120 ml


 


  


 


Intake Oral 390 ml 120 ml


 


# Voids  2


 


# Bowel Movements 1 











Laboratory Tests








Test


  4/25/20


17:00 4/26/20


06:40


 


Stool Occult Blood Pending   


 


White Blood Count


  


  12.7 K/UL


(4.8-10.8)  H


 


Red Blood Count


  


  3.30 M/UL


(4.70-6.10)  L


 


Hemoglobin


  


  9.2 G/DL


(14.2-18.0)  L


 


Hematocrit


  


  28.9 %


(42.0-52.0)  L


 


Mean Corpuscular Volume  88 FL (80-99)  


 


Mean Corpuscular Hemoglobin


  


  27.9 PG


(27.0-31.0)


 


Mean Corpuscular Hemoglobin


Concent 


  31.8 G/DL


(32.0-36.0)  L


 


Red Cell Distribution Width


  


  13.5 %


(11.6-14.8)


 


Platelet Count


  


  270 K/UL


(150-450)


 


Mean Platelet Volume


  


  7.1 FL


(6.5-10.1)


 


Neutrophils (%) (Auto)


  


  76.3 %


(45.0-75.0)  H


 


Lymphocytes (%) (Auto)


  


  14.7 %


(20.0-45.0)  L


 


Monocytes (%) (Auto)


  


  6.7 %


(1.0-10.0)


 


Eosinophils (%) (Auto)


  


  1.7 %


(0.0-3.0)


 


Basophils (%) (Auto)


  


  0.6 %


(0.0-2.0)


 


Sodium Level


  


  136 MMOL/L


(136-145)


 


Potassium Level


  


  4.1 MMOL/L


(3.5-5.1)


 


Chloride Level


  


  96 MMOL/L


()  L


 


Carbon Dioxide Level


  


  29 MMOL/L


(21-32)


 


Anion Gap


  


  12 mmol/L


(5-15)


 


Blood Urea Nitrogen


  


  39 mg/dL


(7-18)  H


 


Creatinine


  


  8.6 MG/DL


(0.55-1.30)  H


 


Estimat Glomerular Filtration


Rate 


  6.0 mL/min


(>60)


 


Glucose Level


  


  84 MG/DL


()


 


Calcium Level


  


  9.0 MG/DL


(8.5-10.1)








Height (Feet):  5


Height (Inches):  5.00


Weight (Pounds):  150


Medications





Current Medications








 Medications


  (Trade)  Dose


 Ordered  Sig/Eliazar


 Route


 PRN Reason  Start Time


 Stop Time Status Last Admin


Dose Admin


 


 Acetaminophen


  (Tylenol)  500 mg  Q6H  PRN


 ORAL


 Temp >100.5  4/15/20 01:00


 5/15/20 00:59  4/21/20 10:34


 


 


 Acetaminophen


  (Tylenol)  650 mg  Q6H  PRN


 ORAL


 For Pain  4/24/20 16:01


 5/24/20 16:00  4/24/20 16:28


 


 


 Aspirin


  (Ecotrin)  81 mg  DAILY


 ORAL


   4/26/20 09:00


 6/10/20 08:59  4/26/20 09:14


 


 


 Atorvastatin


 Calcium


  (Lipitor)  20 mg  BEDTIME


 ORAL


   4/26/20 21:00


 7/25/20 20:59   


 


 


 Cinacalcet


  (Sensipar)  30 mg  DAILY


 ORAL


   4/15/20 09:00


 7/14/20 08:59  4/26/20 09:14


 


 


 Dextrose


  (Dextrose 50%)  25 ml  Q30M  PRN


 IV


 Hypoglycemia  4/15/20 01:15


 7/14/20 01:14   


 


 


 Dextrose


  (Dextrose 50%)  50 ml  Q30M  PRN


 IV


 Hypoglycemia  4/15/20 01:15


 7/14/20 01:14   


 


 


 Enoxaparin Sodium


  (Lovenox)  30 mg  DAILY


 SUBQ


   4/21/20 09:00


 7/20/20 08:59  4/26/20 09:13


 


 


 Hydralazine HCl


  (Apresoline)  25 mg  Q4H  PRN


 ORAL


 bp over 160 syst  4/14/20 19:45


 7/13/20 19:44   


 


 


 Insulin Aspart


  (NovoLOG)    BEFORE MEALS AND  HS


 SUBQ


   4/15/20 06:30


 7/14/20 06:29  4/26/20 11:45


 


 


 Levofloxacin


  (Levaquin)  500 mg  EVERY OTHER  DAY


 ORAL


   4/28/20 09:00


 5/5/20 08:59   


 


 


 Metoprolol


 Succinate


  (Toprol XL)  50 mg  DAILY


 ORAL


   4/26/20 09:00


 7/25/20 08:59  4/26/20 09:14


 


 


 Pantoprazole


  (Protonix)  40 mg  Q12HR


 ORAL


   4/14/20 21:00


 5/14/20 20:59  4/26/20 09:15


 


 


 Sevelamer


 Carbonate


  (Renvela)  800 mg  THREE TIMES A  DAY


 ORAL


   4/15/20 09:00


 7/14/20 08:59  4/26/20 11:57


 


 


 Vitamin B Complex/


 Vit C/Folic Acid


  (Nephrovite)  1 tab  DAILY


 ORAL


   4/15/20 09:00


 5/15/20 08:59  4/26/20 09:14


 











Assessment/Plan


Assessment/Plan:


(1) ESRD of Hemodialysis


(2) Diabetes Mellitus


(3) Degenerative Joint disease


(4) COVID-19 positive


seen dictated











Meek Marte Apr 26, 2020 14:46

## 2020-04-27 VITALS — SYSTOLIC BLOOD PRESSURE: 130 MMHG | DIASTOLIC BLOOD PRESSURE: 49 MMHG

## 2020-04-27 VITALS — SYSTOLIC BLOOD PRESSURE: 127 MMHG | DIASTOLIC BLOOD PRESSURE: 80 MMHG

## 2020-04-27 VITALS — SYSTOLIC BLOOD PRESSURE: 127 MMHG | DIASTOLIC BLOOD PRESSURE: 71 MMHG

## 2020-04-27 VITALS — DIASTOLIC BLOOD PRESSURE: 50 MMHG | SYSTOLIC BLOOD PRESSURE: 128 MMHG

## 2020-04-27 VITALS — SYSTOLIC BLOOD PRESSURE: 121 MMHG | DIASTOLIC BLOOD PRESSURE: 61 MMHG

## 2020-04-27 VITALS — SYSTOLIC BLOOD PRESSURE: 128 MMHG | DIASTOLIC BLOOD PRESSURE: 72 MMHG

## 2020-04-27 LAB
ADD MANUAL DIFF: NO
ALBUMIN SERPL-MCNC: 2.6 G/DL (ref 3.4–5)
ALP SERPL-CCNC: 97 U/L (ref 46–116)
ALT SERPL-CCNC: 36 U/L (ref 12–78)
ANION GAP SERPL CALC-SCNC: 12 MMOL/L (ref 5–15)
AST SERPL-CCNC: 20 U/L (ref 15–37)
BASOPHILS NFR BLD AUTO: 0.8 % (ref 0–2)
BILIRUB DIRECT SERPL-MCNC: 0.2 MG/DL (ref 0–0.3)
BILIRUB SERPL-MCNC: 0.4 MG/DL (ref 0.2–1)
BUN SERPL-MCNC: 54 MG/DL (ref 7–18)
CALCIUM SERPL-MCNC: 8.6 MG/DL (ref 8.5–10.1)
CHLORIDE SERPL-SCNC: 95 MMOL/L (ref 98–107)
CO2 SERPL-SCNC: 26 MMOL/L (ref 21–32)
CREAT SERPL-MCNC: 10.4 MG/DL (ref 0.55–1.3)
EOSINOPHIL NFR BLD AUTO: 0 % (ref 0–3)
ERYTHROCYTE [DISTWIDTH] IN BLOOD BY AUTOMATED COUNT: 12.9 % (ref 11.6–14.8)
HCT VFR BLD CALC: 28.2 % (ref 42–52)
HGB BLD-MCNC: 9.2 G/DL (ref 14.2–18)
LYMPHOCYTES NFR BLD AUTO: 11.2 % (ref 20–45)
MCV RBC AUTO: 87 FL (ref 80–99)
MONOCYTES NFR BLD AUTO: 7.4 % (ref 1–10)
NEUTROPHILS NFR BLD AUTO: 80.6 % (ref 45–75)
PHOSPHATE SERPL-MCNC: 3.1 MG/DL (ref 2.5–4.9)
PLATELET # BLD: 286 K/UL (ref 150–450)
POTASSIUM SERPL-SCNC: 4.4 MMOL/L (ref 3.5–5.1)
RBC # BLD AUTO: 3.25 M/UL (ref 4.7–6.1)
SODIUM SERPL-SCNC: 133 MMOL/L (ref 136–145)
WBC # BLD AUTO: 13.4 K/UL (ref 4.8–10.8)

## 2020-04-27 RX ADMIN — SEVELAMER CARBONATE SCH MG: 800 POWDER, FOR SUSPENSION ORAL at 18:00

## 2020-04-27 RX ADMIN — INSULIN ASPART SCH UNITS: 100 INJECTION, SOLUTION INTRAVENOUS; SUBCUTANEOUS at 16:30

## 2020-04-27 RX ADMIN — INSULIN ASPART SCH UNITS: 100 INJECTION, SOLUTION INTRAVENOUS; SUBCUTANEOUS at 06:30

## 2020-04-27 RX ADMIN — ATORVASTATIN CALCIUM SCH MG: 20 TABLET, FILM COATED ORAL at 20:49

## 2020-04-27 RX ADMIN — METOPROLOL SUCCINATE SCH MG: 50 TABLET, EXTENDED RELEASE ORAL at 09:00

## 2020-04-27 RX ADMIN — ENOXAPARIN SODIUM SCH MG: 30 INJECTION SUBCUTANEOUS at 09:00

## 2020-04-27 RX ADMIN — INSULIN ASPART SCH UNITS: 100 INJECTION, SOLUTION INTRAVENOUS; SUBCUTANEOUS at 20:49

## 2020-04-27 RX ADMIN — ASPIRIN SCH MG: 81 TABLET, DELAYED RELEASE ORAL at 09:00

## 2020-04-27 RX ADMIN — Medication SCH TAB: at 09:54

## 2020-04-27 RX ADMIN — SEVELAMER CARBONATE SCH MG: 800 POWDER, FOR SUSPENSION ORAL at 09:54

## 2020-04-27 RX ADMIN — CINACALCET HYDROCHLORIDE SCH MG: 30 TABLET, COATED ORAL at 09:00

## 2020-04-27 RX ADMIN — SEVELAMER CARBONATE SCH MG: 800 POWDER, FOR SUSPENSION ORAL at 12:37

## 2020-04-27 RX ADMIN — INSULIN ASPART SCH UNITS: 100 INJECTION, SOLUTION INTRAVENOUS; SUBCUTANEOUS at 11:30

## 2020-04-27 NOTE — NUR
NURSE NOTES:

recvd clearance from ID Dr SHRUTHI Pfeiffer, notified SALLIE Ferrari. PT is from home, lives alone. 
Needs assistance. Will need SNF

## 2020-04-27 NOTE — GENERAL PROGRESS NOTE
Assessment/Plan


Problem List:  


(1) Hyperkalemia


ICD Codes:  E87.5 - Hyperkalemia


SNOMED:  36869037


(2) ESRD (end stage renal disease)


ICD Codes:  N18.6 - End stage renal disease


SNOMED:  83817137


(3) Suspected COVID-19 virus infection


ICD Codes:  R68.89 - Other general symptoms and signs


SNOMED:  160800534


Assessment/Plan:


covid positive


htn


esrd on hd


pna


persistent leukocytosis


afebrile


check lytes


sepsis


anemia of chronic renal diseae





Subjective


ROS Limited/Unobtainable:  Yes


Allergies:  


Coded Allergies:  


     No Known Allergies (Unverified , 4/16/13)





Objective





Last 24 Hour Vital Signs








  Date Time  Temp Pulse Resp B/P (MAP) Pulse Ox O2 Delivery O2 Flow Rate FiO2


 


4/27/20 16:00 97.8 83 18 127/80 (96) 98   


 


4/27/20 16:00  62      


 


4/27/20 12:00  61      


 


4/27/20 12:00 98.0 73 19 127/71 (89) 98   


 


4/27/20 09:00  86  121/61    


 


4/27/20 09:00      Room Air  


 


4/27/20 08:00  59      


 


4/27/20 08:00 98.6 86 19 121/61 (81) 98   


 


4/27/20 04:00 98.0 59 18 128/50 (76) 94   


 


4/27/20 04:00  54      


 


4/27/20 00:00  57      


 


4/27/20 00:00 98.1 91 19 130/49 (76) 96   

















Intake and Output  


 


 4/26/20 4/27/20





 19:00 07:00


 


Intake Total 300 ml 360 ml


 


Balance 300 ml 360 ml


 


  


 


Intake Oral 300 ml 360 ml


 


# Voids 2 1


 


# Bowel Movements 1 








Laboratory Tests


4/27/20 09:05: 


White Blood Count 13.4H, Red Blood Count 3.25L, Hemoglobin 9.2L, Hematocrit 

28.2L, Mean Corpuscular Volume 87, Mean Corpuscular Hemoglobin 28.3, Mean 

Corpuscular Hemoglobin Concent 32.8, Red Cell Distribution Width 12.9, Platelet 

Count 286, Mean Platelet Volume 6.6, Neutrophils (%) (Auto) 80.6H, Lymphocytes (

%) (Auto) 11.2L, Monocytes (%) (Auto) 7.4, Eosinophils (%) (Auto) 0.0, 

Basophils (%) (Auto) 0.8, Sodium Level 133L, Potassium Level 4.4, Chloride 

Level 95L, Carbon Dioxide Level 26, Anion Gap 12, Blood Urea Nitrogen 54H, 

Creatinine 10.4H, Estimat Glomerular Filtration Rate 4.8, Glucose Level 114H, 

Calcium Level 8.6, Phosphorus Level 3.1, Magnesium Level 2.2, Total Bilirubin 

0.4, Direct Bilirubin 0.2, Aspartate Amino Transf (AST/SGOT) 20, Alanine 

Aminotransferase (ALT/SGPT) 36, Alkaline Phosphatase 97, Total Protein 6.3L, 

Albumin 2.6L


Height (Feet):  5


Height (Inches):  5.00


Weight (Pounds):  150











Van Silva MD Apr 27, 2020 21:58

## 2020-04-27 NOTE — PULMONOLOGY PROGRESS NOTE
Assessment/Plan


Assessment/Plan


IMPRESSION:


1. History of COVID-19 exposure. Is + COVID 19 here


2. ESRD, on dialysis.


3. Diabetes mellitus.


4. Dyspnea.





DISCUSSION:  





Consider use of Plaquenil or azithromycin only if patient becomes hypoxic.


Currently SaO2 92-95% on RA alternating with 2L/min O2


Currently, he is doing well.  I would avoid unnecessary antibiotics.


Agree with ongoing dialysis.  I will follow carefully.














  ______________________________________________


  Oh Solomon M.D.





Subjective


ROS Limited/Unobtainable:  Yes


Interval Events:  + COVID 19; no new complaints


Constitutional:  Denies: fever


HEENT:  Repors: no symptoms


Respiratory:  Reports: no symptoms


Cardiovascular:  Reports: no symptoms


Gastrointestinal/Abdominal:  Reports: no symptoms


Allergies:  


Coded Allergies:  


     No Known Allergies (Unverified , 4/16/13)





Objective





Last 24 Hour Vital Signs








  Date Time  Temp Pulse Resp B/P (MAP) Pulse Ox O2 Delivery O2 Flow Rate FiO2


 


4/27/20 09:00  86  121/61    


 


4/27/20 08:00 98.6 86 19 121/61 (81) 98   


 


4/27/20 04:00 98.0 59 18 128/50 (76) 94   


 


4/27/20 04:00  54      


 


4/27/20 00:00  57      


 


4/27/20 00:00 98.1 91 19 130/49 (76) 96   


 


4/26/20 21:00      Nasal Cannula 2.0 


 


4/26/20 20:00 98.0 63 18 147/51 (83) 94   


 


4/26/20 20:00  67      


 


4/26/20 16:00 97.9 72 20 122/54 (76) 94   


 


4/26/20 16:00  71      


 


4/26/20 12:00 98.1 72 20 113/47 (69) 93   


 


4/26/20 12:00  69      

















Intake and Output  


 


 4/26/20 4/27/20





 19:00 07:00


 


Intake Total 300 ml 360 ml


 


Balance 300 ml 360 ml


 


  


 


Intake Oral 300 ml 360 ml


 


# Voids 2 1


 


# Bowel Movements 1 








General Appearance:  no acute distress


HEENT:  mucous membranes moist


Respiratory/Chest:  chest wall non-tender, lungs clear


Cardiovascular:  normal peripheral pulses


Abdomen:  soft, non tender


Extremities:  no edema


Neurologic/Psychiatric:  alert, responsive


Laboratory Tests


4/27/20 09:05: 


White Blood Count 13.4H, Red Blood Count 3.25L, Hemoglobin 9.2L, Hematocrit 

28.2L, Mean Corpuscular Volume 87, Mean Corpuscular Hemoglobin 28.3, Mean 

Corpuscular Hemoglobin Concent 32.8, Red Cell Distribution Width 12.9, Platelet 

Count 286, Mean Platelet Volume 6.6, Neutrophils (%) (Auto) 80.6H, Lymphocytes (

%) (Auto) 11.2L, Monocytes (%) (Auto) 7.4, Eosinophils (%) (Auto) 0.0, 

Basophils (%) (Auto) 0.8, Sodium Level 133L, Potassium Level 4.4, Chloride 

Level 95L, Carbon Dioxide Level 26, Anion Gap 12, Blood Urea Nitrogen 54H, 

Creatinine 10.4H, Estimat Glomerular Filtration Rate 4.8, Glucose Level 114H, 

Calcium Level 8.6, Phosphorus Level 3.1, Magnesium Level 2.2, Total Bilirubin 

0.4, Direct Bilirubin 0.2, Aspartate Amino Transf (AST/SGOT) 20, Alanine 

Aminotransferase (ALT/SGPT) 36, Alkaline Phosphatase 97, Total Protein 6.3L, 

Albumin 2.6L





Current Medications








 Medications


  (Trade)  Dose


 Ordered  Sig/Eliazar


 Route


 PRN Reason  Start Time


 Stop Time Status Last Admin


Dose Admin


 


 Acetaminophen


  (Tylenol)  500 mg  Q6H  PRN


 ORAL


 Temp >100.5  4/15/20 01:00


 5/15/20 00:59  4/21/20 10:34


 


 


 Acetaminophen


  (Tylenol)  650 mg  Q6H  PRN


 ORAL


 For Pain  4/24/20 16:01


 5/24/20 16:00  4/24/20 16:28


 


 


 Aspirin


  (Ecotrin)  81 mg  DAILY


 ORAL


   4/26/20 09:00


 6/10/20 08:59  4/26/20 09:14


 


 


 Atorvastatin


 Calcium


  (Lipitor)  20 mg  BEDTIME


 ORAL


   4/26/20 21:00


 7/25/20 20:59  4/26/20 22:38


 


 


 Cinacalcet


  (Sensipar)  30 mg  DAILY


 ORAL


   4/15/20 09:00


 7/14/20 08:59  4/26/20 09:14


 


 


 Dextrose


  (Dextrose 50%)  25 ml  Q30M  PRN


 IV


 Hypoglycemia  4/15/20 01:15


 7/14/20 01:14   


 


 


 Dextrose


  (Dextrose 50%)  50 ml  Q30M  PRN


 IV


 Hypoglycemia  4/15/20 01:15


 7/14/20 01:14   


 


 


 Enoxaparin Sodium


  (Lovenox)  30 mg  DAILY


 SUBQ


   4/21/20 09:00


 7/20/20 08:59  4/26/20 09:13


 


 


 Hydralazine HCl


  (Apresoline)  25 mg  Q4H  PRN


 ORAL


 bp over 160 syst  4/14/20 19:45


 7/13/20 19:44   


 


 


 Insulin Aspart


  (NovoLOG)    BEFORE MEALS AND  HS


 SUBQ


   4/15/20 06:30


 7/14/20 06:29  4/26/20 16:34


 


 


 Levofloxacin


  (Levaquin)  500 mg  EVERY OTHER  DAY


 ORAL


   4/28/20 09:00


 5/5/20 08:59   


 


 


 Metoprolol


 Succinate


  (Toprol XL)  50 mg  DAILY


 ORAL


   4/26/20 09:00


 7/25/20 08:59  4/26/20 09:14


 


 


 Pantoprazole


  (Protonix)  40 mg  DAILY


 ORAL


   4/28/20 09:00


 5/28/20 08:59   


 


 


 Sevelamer


 Carbonate


  (Renvela)  800 mg  THREE TIMES A  DAY


 ORAL


   4/15/20 09:00


 7/14/20 08:59  4/27/20 09:54


 


 


 Vitamin B Complex/


 Vit C/Folic Acid


  (Nephrovite)  1 tab  DAILY


 ORAL


   4/15/20 09:00


 5/15/20 08:59  4/27/20 09:54


 

















Oh Solomon MD Apr 27, 2020 11:07

## 2020-04-27 NOTE — NUR
NURSE NOTES:

Pt still needs COVID swab to be done. Spoke with [facility] and  who stated 
that swab can be obtained from ER at this time. Spoke with ER RN who stated that they do not 
have enough COVID swabs available for the night. Will endorse to morning shift.

## 2020-04-27 NOTE — GENERAL PROGRESS NOTE
Assessment/Plan


Status:  progressing, unchanged


Assessment/Plan:


Assessment/Plan


Problems:  


(1) Abdominal pain


ICD Codes:  R10.9 - Unspecified abdominal pain


SNOMED:  74648705


(2) COVID-19 virus infection


ICD Codes:  U07.1 - COVID-19


SNOMED:  508877893


(3) Suspected COVID-19 virus infection


ICD Codes:  R68.89 - Other general symptoms and signs


SNOMED:  101543853


(4) ESRD (end stage renal disease)


ICD Codes:  N18.6 - End stage renal disease


SNOMED:  45205554


(5) Dyspnea


(6) Diarrhea


Assessment/Plan


Abdominal Pain unknown etiology.  Given that the patient is positive for COVID-

19, the patient may demonstrate gastrointestinal symptoms such as diarrhea, 

nausea or vomiting in which none are present at this time.  Less likely biliary 

colic or cholecystitis given normal liver enzymes.  At time of evaluation, 

patient denies any abdominal pain.  However, family reports patient is having 

back pain.


-We will obtain KUB to evaluate for constipation given last documented bowel 

movement on April 23, 2020 >> unremarkable.


-PT eval


-Tylenol prn for pain





Anemia most likely secondary to end-stage renal disease.


-Obtain occult blood stool to evaluate for any GI bleed


- no plans for GI procedures unless emergent


-Monitor H&H, PRN transfusions


-PPI p.o. daily





stool studies for diarrhea





Subjective


ROS Limited/Unobtainable:  Yes


Allergies:  


Coded Allergies:  


     No Known Allergies (Unverified , 4/16/13)





Objective





Last 24 Hour Vital Signs








  Date Time  Temp Pulse Resp B/P (MAP) Pulse Ox O2 Delivery O2 Flow Rate FiO2


 


4/27/20 08:00 98.6 86 19 121/61 (81) 98   


 


4/27/20 04:00 98.0 59 18 128/50 (76) 94   


 


4/27/20 04:00  54      


 


4/27/20 00:00  57      


 


4/27/20 00:00 98.1 91 19 130/49 (76) 96   


 


4/26/20 21:00      Nasal Cannula 2.0 


 


4/26/20 20:00 98.0 63 18 147/51 (83) 94   


 


4/26/20 20:00  67      


 


4/26/20 16:00 97.9 72 20 122/54 (76) 94   


 


4/26/20 16:00  71      


 


4/26/20 12:00 98.1 72 20 113/47 (69) 93   


 


4/26/20 12:00  69      

















Intake and Output  


 


 4/26/20 4/27/20





 19:00 07:00


 


Intake Total 300 ml 360 ml


 


Balance 300 ml 360 ml


 


  


 


Intake Oral 300 ml 360 ml


 


# Voids 2 1


 


# Bowel Movements 1 








Laboratory Tests


4/27/20 09:05: 


White Blood Count 13.4H, Red Blood Count 3.25L, Hemoglobin 9.2L, Hematocrit 

28.2L, Mean Corpuscular Volume 87, Mean Corpuscular Hemoglobin 28.3, Mean 

Corpuscular Hemoglobin Concent 32.8, Red Cell Distribution Width 12.9, Platelet 

Count 286, Mean Platelet Volume 6.6, Neutrophils (%) (Auto) 80.6H, Lymphocytes (

%) (Auto) 11.2L, Monocytes (%) (Auto) 7.4, Eosinophils (%) (Auto) 0.0, 

Basophils (%) (Auto) 0.8, Sodium Level [Pending], Potassium Level [Pending], 

Chloride Level [Pending], Carbon Dioxide Level [Pending], Blood Urea Nitrogen [

Pending], Creatinine [Pending], Estimat Glomerular Filtration Rate [Pending], 

Glucose Level [Pending], Calcium Level [Pending], Phosphorus Level [Pending], 

Magnesium Level [Pending]


Height (Feet):  5


Height (Inches):  5.00


Weight (Pounds):  150


General Appearance:  no apparent distress


EENT:  normal ENT inspection


Neck:  supple


Cardiovascular:  normal rate


Respiratory/Chest:  decreased breath sounds


Abdomen:  normal bowel sounds, non tender, soft


Extremities:  non-tender











Darrion Kessler MD Apr 27, 2020 09:18

## 2020-04-27 NOTE — NUR
NURSE NOTES:

Pt received from [facility] alert and oriented x4, primarily French-speaking with no acute 
s/s of distress noted. Per [facility] RN, pt just finished HD - 2L out. No IV site noted on 
patient, MD aware. Bed in lowest position, call light and belongings within reach. Per 
report,

## 2020-04-27 NOTE — NUR
CASE MANAGEMENT:REVIEW



4/27/20

SI: COVID 19 PNEUMONIA. ESRD/HD

98.0   73  19  127/71  98% ON RA

WBC+13.4   H/H-9.2/28.2   BUN+54  CR+10.4   



IS: LEVAQUIN PO QOD

LOVENOX SQ QD

RENVELA PO TID

SENSIPAR PO QD

LABETALOL PO QD

SS INSULIN AC+HS

PROTONIX PO Q12

NEPHROVITE PO QD

SS INSULIN AC+HS

PROTONIX PO Q12

**: TELEMETRY STATUS

DCP: FROM HOME WITH OUTPT DIALYSIS



PLAN: 

COVID 19 TEST FROM 4/27/20...PENDING

FAMILY IS NOW REQUESTING SNF PLACEMENT FOR PATIENT

## 2020-04-27 NOTE — INFECTIOUS DISEASES PROG NOTE
Assessment/Plan


Assessment/Plan


IMPRESSION:


1. Leukocytosis


2. COVID19 disease.likely mild disease


    Positive: 4/14-4/23


3. COPD.


4. End-stage renal disease on hemodialysis.


5. Diabetes mellitus.


6. Hypertension.


7. Anemia.





RECOMMENDATION:  


Continue Levaquin


Will f/u COVID19 test 


Will f/u C. difficile test





Subjective


ROS Limited/Unobtainable:  Yes


Constitutional:  Denies: fever


Allergies:  


Coded Allergies:  


     No Known Allergies (Unverified , 4/16/13)





Objective


Vital Signs





Last 24 Hour Vital Signs








  Date Time  Temp Pulse Resp B/P (MAP) Pulse Ox O2 Delivery O2 Flow Rate FiO2


 


4/27/20 09:00  86  121/61    


 


4/27/20 08:00 98.6 86 19 121/61 (81) 98   


 


4/27/20 04:00 98.0 59 18 128/50 (76) 94   


 


4/27/20 04:00  54      


 


4/27/20 00:00  57      


 


4/27/20 00:00 98.1 91 19 130/49 (76) 96   


 


4/26/20 21:00      Nasal Cannula 2.0 


 


4/26/20 20:00 98.0 63 18 147/51 (83) 94   


 


4/26/20 20:00  67      


 


4/26/20 16:00 97.9 72 20 122/54 (76) 94   


 


4/26/20 16:00  71      


 


4/26/20 12:00 98.1 72 20 113/47 (69) 93   


 


4/26/20 12:00  69      








Height (Feet):  5


Height (Inches):  5.00


Weight (Pounds):  150


General Appearance:  no acute distress


HEENT:  mucous membranes moist


Respiratory/Chest:  lungs clear


Cardiovascular:  normal rate


Abdomen:  soft, non tender


Extremities:  no edema


Neurologic/Psychiatric:  other - sleeping





Laboratory Tests








Test


  4/27/20


09:05


 


White Blood Count


  13.4 K/UL


(4.8-10.8)  H


 


Red Blood Count


  3.25 M/UL


(4.70-6.10)  L


 


Hemoglobin


  9.2 G/DL


(14.2-18.0)  L


 


Hematocrit


  28.2 %


(42.0-52.0)  L


 


Mean Corpuscular Volume 87 FL (80-99)  


 


Mean Corpuscular Hemoglobin


  28.3 PG


(27.0-31.0)


 


Mean Corpuscular Hemoglobin


Concent 32.8 G/DL


(32.0-36.0)


 


Red Cell Distribution Width


  12.9 %


(11.6-14.8)


 


Platelet Count


  286 K/UL


(150-450)


 


Mean Platelet Volume


  6.6 FL


(6.5-10.1)


 


Neutrophils (%) (Auto)


  80.6 %


(45.0-75.0)  H


 


Lymphocytes (%) (Auto)


  11.2 %


(20.0-45.0)  L


 


Monocytes (%) (Auto)


  7.4 %


(1.0-10.0)


 


Eosinophils (%) (Auto)


  0.0 %


(0.0-3.0)


 


Basophils (%) (Auto)


  0.8 %


(0.0-2.0)


 


Sodium Level


  133 MMOL/L


(136-145)  L


 


Potassium Level


  4.4 MMOL/L


(3.5-5.1)


 


Chloride Level


  95 MMOL/L


()  L


 


Carbon Dioxide Level


  26 MMOL/L


(21-32)


 


Anion Gap


  12 mmol/L


(5-15)


 


Blood Urea Nitrogen


  54 mg/dL


(7-18)  H


 


Creatinine


  10.4 MG/DL


(0.55-1.30)  H


 


Estimat Glomerular Filtration


Rate 4.8 mL/min


(>60)


 


Glucose Level


  114 MG/DL


()  H


 


Calcium Level


  8.6 MG/DL


(8.5-10.1)


 


Phosphorus Level


  3.1 MG/DL


(2.5-4.9)


 


Magnesium Level


  2.2 MG/DL


(1.8-2.4)


 


Total Bilirubin


  0.4 MG/DL


(0.2-1.0)


 


Direct Bilirubin


  0.2 MG/DL


(0.0-0.3)


 


Aspartate Amino Transf


(AST/SGOT) 20 U/L (15-37)


 


 


Alanine Aminotransferase


(ALT/SGPT) 36 U/L (12-78)


 


 


Alkaline Phosphatase


  97 U/L


()


 


Total Protein


  6.3 G/DL


(6.4-8.2)  L


 


Albumin


  2.6 G/DL


(3.4-5.0)  L











Current Medications








 Medications


  (Trade)  Dose


 Ordered  Sig/Eliazar


 Route


 PRN Reason  Start Time


 Stop Time Status Last Admin


Dose Admin


 


 Acetaminophen


  (Tylenol)  500 mg  Q6H  PRN


 ORAL


 Temp >100.5  4/15/20 01:00


 5/15/20 00:59  4/21/20 10:34


 


 


 Acetaminophen


  (Tylenol)  650 mg  Q6H  PRN


 ORAL


 For Pain  4/24/20 16:01


 5/24/20 16:00  4/24/20 16:28


 


 


 Aspirin


  (Ecotrin)  81 mg  DAILY


 ORAL


   4/26/20 09:00


 6/10/20 08:59  4/26/20 09:14


 


 


 Atorvastatin


 Calcium


  (Lipitor)  20 mg  BEDTIME


 ORAL


   4/26/20 21:00


 7/25/20 20:59  4/26/20 22:38


 


 


 Cinacalcet


  (Sensipar)  30 mg  DAILY


 ORAL


   4/15/20 09:00


 7/14/20 08:59  4/26/20 09:14


 


 


 Dextrose


  (Dextrose 50%)  25 ml  Q30M  PRN


 IV


 Hypoglycemia  4/15/20 01:15


 7/14/20 01:14   


 


 


 Dextrose


  (Dextrose 50%)  50 ml  Q30M  PRN


 IV


 Hypoglycemia  4/15/20 01:15


 7/14/20 01:14   


 


 


 Enoxaparin Sodium


  (Lovenox)  30 mg  DAILY


 SUBQ


   4/21/20 09:00


 7/20/20 08:59  4/26/20 09:13


 


 


 Hydralazine HCl


  (Apresoline)  25 mg  Q4H  PRN


 ORAL


 bp over 160 syst  4/14/20 19:45


 7/13/20 19:44   


 


 


 Insulin Aspart


  (NovoLOG)    BEFORE MEALS AND  HS


 SUBQ


   4/15/20 06:30


 7/14/20 06:29  4/26/20 16:34


 


 


 Levofloxacin


  (Levaquin)  500 mg  EVERY OTHER  DAY


 ORAL


   4/28/20 09:00


 5/5/20 08:59   


 


 


 Metoprolol


 Succinate


  (Toprol XL)  50 mg  DAILY


 ORAL


   4/26/20 09:00


 7/25/20 08:59  4/26/20 09:14


 


 


 Pantoprazole


  (Protonix)  40 mg  DAILY


 ORAL


   4/28/20 09:00


 5/28/20 08:59   


 


 


 Sevelamer


 Carbonate


  (Renvela)  800 mg  THREE TIMES A  DAY


 ORAL


   4/15/20 09:00


 7/14/20 08:59  4/27/20 09:54


 


 


 Vitamin B Complex/


 Vit C/Folic Acid


  (Nephrovite)  1 tab  DAILY


 ORAL


   4/15/20 09:00


 5/15/20 08:59  4/27/20 09:54


 

















Jake Pfeiffer MD Apr 27, 2020 11:59

## 2020-04-27 NOTE — CARDIOLOGY PROGRESS NOTE
Assessment/Plan


Assessment/Plan


1. Trigeminy ventricular premature depolarizations.  Continue metoprolol XL.


2. Pulmonary edema due to COVID-19 infection, chronic diastolic heart failure 

can contribute to some extent, continue with hemodialysis. Continue metoprolol.


3. History of diabetes mellitus.  Continue aspirin and atorvastatin.


4. History of hypertension, continue metoprolol.





Subjective


Subjective


Sinus rhythm at rate of 72.





Objective





Last 24 Hour Vital Signs








  Date Time  Temp Pulse Resp B/P (MAP) Pulse Ox O2 Delivery O2 Flow Rate FiO2


 


4/27/20 21:00      Nasal Cannula 2.0 


 


4/27/20 20:00 98.1 72 18 128/72 (90) 98   


 


4/27/20 20:00  79      


 


4/27/20 16:00 97.8 83 18 127/80 (96) 98   


 


4/27/20 16:00  62      


 


4/27/20 12:00  61      


 


4/27/20 12:00 98.0 73 19 127/71 (89) 98   


 


4/27/20 09:00  86  121/61    


 


4/27/20 09:00      Room Air  


 


4/27/20 08:00  59      


 


4/27/20 08:00 98.6 86 19 121/61 (81) 98   


 


4/27/20 04:00 98.0 59 18 128/50 (76) 94   


 


4/27/20 04:00  54      


 


4/27/20 00:00  57      


 


4/27/20 00:00 98.1 91 19 130/49 (76) 96   

















Intake and Output  


 


 4/26/20 4/27/20





 19:00 07:00


 


Intake Total 300 ml 360 ml


 


Balance 300 ml 360 ml


 


  


 


Intake Oral 300 ml 360 ml


 


# Voids 2 1


 


# Bowel Movements 1 











Laboratory Tests








Test


  4/27/20


09:05


 


White Blood Count


  13.4 K/UL


(4.8-10.8)  H


 


Red Blood Count


  3.25 M/UL


(4.70-6.10)  L


 


Hemoglobin


  9.2 G/DL


(14.2-18.0)  L


 


Hematocrit


  28.2 %


(42.0-52.0)  L


 


Mean Corpuscular Volume 87 FL (80-99)  


 


Mean Corpuscular Hemoglobin


  28.3 PG


(27.0-31.0)


 


Mean Corpuscular Hemoglobin


Concent 32.8 G/DL


(32.0-36.0)


 


Red Cell Distribution Width


  12.9 %


(11.6-14.8)


 


Platelet Count


  286 K/UL


(150-450)


 


Mean Platelet Volume


  6.6 FL


(6.5-10.1)


 


Neutrophils (%) (Auto)


  80.6 %


(45.0-75.0)  H


 


Lymphocytes (%) (Auto)


  11.2 %


(20.0-45.0)  L


 


Monocytes (%) (Auto)


  7.4 %


(1.0-10.0)


 


Eosinophils (%) (Auto)


  0.0 %


(0.0-3.0)


 


Basophils (%) (Auto)


  0.8 %


(0.0-2.0)


 


Sodium Level


  133 MMOL/L


(136-145)  L


 


Potassium Level


  4.4 MMOL/L


(3.5-5.1)


 


Chloride Level


  95 MMOL/L


()  L


 


Carbon Dioxide Level


  26 MMOL/L


(21-32)


 


Anion Gap


  12 mmol/L


(5-15)


 


Blood Urea Nitrogen


  54 mg/dL


(7-18)  H


 


Creatinine


  10.4 MG/DL


(0.55-1.30)  H


 


Estimat Glomerular Filtration


Rate 4.8 mL/min


(>60)


 


Glucose Level


  114 MG/DL


()  H


 


Calcium Level


  8.6 MG/DL


(8.5-10.1)


 


Phosphorus Level


  3.1 MG/DL


(2.5-4.9)


 


Magnesium Level


  2.2 MG/DL


(1.8-2.4)


 


Total Bilirubin


  0.4 MG/DL


(0.2-1.0)


 


Direct Bilirubin


  0.2 MG/DL


(0.0-0.3)


 


Aspartate Amino Transf


(AST/SGOT) 20 U/L (15-37)


 


 


Alanine Aminotransferase


(ALT/SGPT) 36 U/L (12-78)


 


 


Alkaline Phosphatase


  97 U/L


()


 


Total Protein


  6.3 G/DL


(6.4-8.2)  L


 


Albumin


  2.6 G/DL


(3.4-5.0)  L








Objective


HEENT:  Normocephalic, anicteric, PERRLA, EOMI, Pink conjunctiva.


NECK: No JVD, no carotid bruit.


CVS:  Normal S1S2, regular rate and rhythm, no murmurs, gallops or rubs.


LUNGS:  Clear B/L


ABDOMEN:  Soft and nontender/nondistended, + BS.


EXTREMITIES:  No edema, clubbing or cyanosis.


NEUROLOGIC:  Awake, alert, verbal.  No focal weakness.











Mike Todd MD Apr 27, 2020 23:58

## 2020-04-27 NOTE — NEPHROLOGY PROGRESS NOTE
Assessment/Plan


Problem List:  


(1) ESRD (end stage renal disease)


(2) Suspected COVID-19 virus infection


(3) Hyperkalemia


(4) COVID-19 virus infection


Assessment





End-stage renal disease on hemodialysis


Has arm fistula for dialysis-Next dialysis Wednesday, April 15


Dyspnea and shortness of breath, history of COPD, oxygen dependent at home


Exposure to COVID 19


Diabetes mellitus


Hypertension


Remote history of seizure


Anemia of kidney disease


Plan





COVID 19 test detected





Hemodialysis  April 24 next April 27


Keep the blood pressure and blood sugar in check


2D echocardiogram pending


Antibiotics and pulmonary support per consultants





Chest x-ray:


Findings: There is a left chest  HERO catheter, tip projected at the level of 

the


superior vena cava. Some atelectatic bands are seen in the left midlung. The 

lungs


and pleural spaces are otherwise clear. The heart size is upper limits of 

normal. A


stent is seen in the left axilla


Impression: Left midlung atelectasis. No acute process otherwise





Subjective


ROS Limited/Unobtainable:  No


Constitutional:  Reports: malaise, weakness





Objective


Objective





Last 24 Hour Vital Signs








  Date Time  Temp Pulse Resp B/P (MAP) Pulse Ox O2 Delivery O2 Flow Rate FiO2


 


4/27/20 12:00 98.0 73 19 127/71 (89) 98   


 


4/27/20 09:00  86  121/61    


 


4/27/20 09:00      Room Air  


 


4/27/20 08:00  59      


 


4/27/20 08:00 98.6 86 19 121/61 (81) 98   


 


4/27/20 04:00 98.0 59 18 128/50 (76) 94   


 


4/27/20 04:00  54      


 


4/27/20 00:00  57      


 


4/27/20 00:00 98.1 91 19 130/49 (76) 96   


 


4/26/20 21:00      Nasal Cannula 2.0 


 


4/26/20 20:00 98.0 63 18 147/51 (83) 94   


 


4/26/20 20:00  67      


 


4/26/20 16:00 97.9 72 20 122/54 (76) 94   


 


4/26/20 16:00  71      

















Intake and Output  


 


 4/26/20 4/27/20





 19:00 07:00


 


Intake Total 300 ml 360 ml


 


Balance 300 ml 360 ml


 


  


 


Intake Oral 300 ml 360 ml


 


# Voids 2 1


 


# Bowel Movements 1 








Laboratory Tests


4/27/20 09:05: 


White Blood Count 13.4H, Red Blood Count 3.25L, Hemoglobin 9.2L, Hematocrit 

28.2L, Mean Corpuscular Volume 87, Mean Corpuscular Hemoglobin 28.3, Mean 

Corpuscular Hemoglobin Concent 32.8, Red Cell Distribution Width 12.9, Platelet 

Count 286, Mean Platelet Volume 6.6, Neutrophils (%) (Auto) 80.6H, Lymphocytes (

%) (Auto) 11.2L, Monocytes (%) (Auto) 7.4, Eosinophils (%) (Auto) 0.0, 

Basophils (%) (Auto) 0.8, Sodium Level 133L, Potassium Level 4.4, Chloride 

Level 95L, Carbon Dioxide Level 26, Anion Gap 12, Blood Urea Nitrogen 54H, 

Creatinine 10.4H, Estimat Glomerular Filtration Rate 4.8, Glucose Level 114H, 

Calcium Level 8.6, Phosphorus Level 3.1, Magnesium Level 2.2, Total Bilirubin 

0.4, Direct Bilirubin 0.2, Aspartate Amino Transf (AST/SGOT) 20, Alanine 

Aminotransferase (ALT/SGPT) 36, Alkaline Phosphatase 97, Total Protein 6.3L, 

Albumin 2.6L


Height (Feet):  5


Height (Inches):  5.00


Weight (Pounds):  150


General Appearance:  no apparent distress


Objective


no change











Vik Morrison MD Apr 27, 2020 12:17

## 2020-04-27 NOTE — HEMATOLOGY/ONC PROGRESS NOTE
Assessment/Plan


Assessment/Plan


Asses/Recs


# Elevated D-dimer on admission, with sob, in this case r/o dvt of lower ext


--> less likely pe, first r/o dvt given more likely viral cause possible


--> obtain duplex of lower ext--> NEG FOR pe


--> repeat in future in 3mo


# Anemia of chronic disease due to underlying chronic medical issues, 

multifactorial v Gi bleed 


--> Anemia workup has been ordered, rule out gi bleed 


--> No evidence of hemolysis is noted, peripheral smear has been reviewed.


--> Hgb goal >7. Transfuse prn.


--> Epogen or iron at this time is not particularly indicated


--> Medications have been reviewed


--> hgb trend: 9.8 -->10-->10.8-->10.3-->9.6-->9.2


# Leukocytosis r/o infection


--> as per id


--> on levoflox


# Dyspnea


--> appears likely covid related


--> pulm aware


# ESRD (end stage renal disease)


--> hd as per Dr. Morrison


# Seizures.


# Hypertension.


# Diabetes mellitus.


# Dvt ppx lovenox sq





The timing of this note does not necessarily reflect the time of the patient 

was seen.





Greatly appreciate consultation.





Subjective


Constitutional:  Denies: no symptoms, chills, fever, malaise, weakness, other


HEENT:  Denies: no symptoms, eye pain, blurred vision, tearing, double vision, 

ear pain, ear discharge, nose pain, nose congestion, throat pain, throat 

swelling, mouth pain, mouth swelling, other


Cardiovascular:  Denies: no symptoms, chest pain, edema, irregular heart rate, 

lightheadedness, palpitations, syncope, other


Respiratory:  Denies: no symptoms, cough, shortness of breath, SOB with 

excertion, SOB at rest, sputum, wheezing, other


Gastrointestinal/Abdominal:  Denies: no symptoms, abdomen distended, abdominal 

pain, black stools, tarry stools, blood in stool, constipated, diarrhea, 

difficulty swallowing, nausea, poor appetite, poor fluid intake, rectal bleeding

, vomiting, other


Genitourinary:  Denies: no symptoms, burning, discharge, frequency, flank pain, 

hematuria, incontinence, pain, urgency, other


Neurologic/Psychiatric:  Denies: no symptoms, anxiety, depressed, emotional 

problems, headache, numbness, paresthesia, pre-existing deficit, seizure, 

tingling, tremors, weakness, other


Endocrine:  Denies: no symptoms, excessive sweating, flushing, intolerance to 

cold, intolerance to heat, increased hunger, increased thirst, increased urine, 

unexplained weight gain, unexplained weight loss, other


Hematologic/Lymphatic:  Denies: no symptoms, anemia, easy bleeding, easy 

bruising, adenopathy, other


Allergies:  


Coded Allergies:  


     No Known Allergies (Unverified , 4/16/13)


Subjective


4/16 no bleeding , labs noted, hd as needed, renal aware


4/17 on tele, no acute events, nc 2l, sodium 135, hd for today 


4/19 eating breakfast, no overnight events, meds reviewed 


4/20 no events, no bleeding, no fc, no night sweats, hgb 10, hd today


4/21 no major changes, remains lucid, no bleeding, labs noted


4/22 labs noted, difficult stick, labs ordered, no bleeding


4/23 labs have been reviewed, no bleeding, smear is noted, no bleeding


4/24 no bleeding, no events, labs reviewed, labs noted, Lao speaking


4/26 no major changes, no bleeding, labs noted, hgb is 9.6, no hemolysis, on 2l 

nc


4/27 labs have been noted, reviewed, no bleeding, no night sweats





Objective


Objective





Current Medications








 Medications


  (Trade)  Dose


 Ordered  Sig/Eliazar


 Route


 PRN Reason  Start Time


 Stop Time Status Last Admin


Dose Admin


 


 Acetaminophen


  (Tylenol)  500 mg  Q6H  PRN


 ORAL


 Temp >100.5  4/15/20 01:00


 5/15/20 00:59  4/21/20 10:34


 


 


 Acetaminophen


  (Tylenol)  650 mg  Q6H  PRN


 ORAL


 For Pain  4/24/20 16:01


 5/24/20 16:00  4/24/20 16:28


 


 


 Aspirin


  (Ecotrin)  81 mg  DAILY


 ORAL


   4/26/20 09:00


 6/10/20 08:59  4/26/20 09:14


 


 


 Atorvastatin


 Calcium


  (Lipitor)  20 mg  BEDTIME


 ORAL


   4/26/20 21:00


 7/25/20 20:59  4/26/20 22:38


 


 


 Cinacalcet


  (Sensipar)  30 mg  DAILY


 ORAL


   4/15/20 09:00


 7/14/20 08:59  4/26/20 09:14


 


 


 Dextrose


  (Dextrose 50%)  25 ml  Q30M  PRN


 IV


 Hypoglycemia  4/15/20 01:15


 7/14/20 01:14   


 


 


 Dextrose


  (Dextrose 50%)  50 ml  Q30M  PRN


 IV


 Hypoglycemia  4/15/20 01:15


 7/14/20 01:14   


 


 


 Enoxaparin Sodium


  (Lovenox)  30 mg  DAILY


 SUBQ


   4/21/20 09:00


 7/20/20 08:59  4/26/20 09:13


 


 


 Hydralazine HCl


  (Apresoline)  25 mg  Q4H  PRN


 ORAL


 bp over 160 syst  4/14/20 19:45


 7/13/20 19:44   


 


 


 Insulin Aspart


  (NovoLOG)    BEFORE MEALS AND  HS


 SUBQ


   4/15/20 06:30


 7/14/20 06:29  4/26/20 16:34


 


 


 Levofloxacin


  (Levaquin)  500 mg  EVERY OTHER  DAY


 ORAL


   4/28/20 09:00


 5/5/20 08:59   


 


 


 Metoprolol


 Succinate


  (Toprol XL)  50 mg  DAILY


 ORAL


   4/26/20 09:00


 7/25/20 08:59  4/26/20 09:14


 


 


 Pantoprazole


  (Protonix)  40 mg  Q12HR


 ORAL


   4/14/20 21:00


 5/14/20 20:59  4/26/20 22:38


 


 


 Sevelamer


 Carbonate


  (Renvela)  800 mg  THREE TIMES A  DAY


 ORAL


   4/15/20 09:00


 7/14/20 08:59  4/26/20 17:31


 


 


 Vitamin B Complex/


 Vit C/Folic Acid


  (Nephrovite)  1 tab  DAILY


 ORAL


   4/15/20 09:00


 5/15/20 08:59  4/26/20 09:14


 











Last 24 Hour Vital Signs








  Date Time  Temp Pulse Resp B/P (MAP) Pulse Ox O2 Delivery O2 Flow Rate FiO2


 


4/27/20 04:00 98.0 59 18 128/50 (76) 94   


 


4/27/20 04:00  54      


 


4/27/20 00:00  57      


 


4/27/20 00:00 98.1 91 19 130/49 (76) 96   


 


4/26/20 21:00      Nasal Cannula 2.0 


 


4/26/20 20:00 98.0 63 18 147/51 (83) 94   


 


4/26/20 20:00  67      


 


4/26/20 16:00 97.9 72 20 122/54 (76) 94   


 


4/26/20 16:00  71      


 


4/26/20 12:00 98.1 72 20 113/47 (69) 93   


 


4/26/20 12:00  69      


 


4/26/20 09:14  71  126/54    


 


4/26/20 09:00      Nasal Cannula 2.0 


 


4/26/20 08:00  70      


 


4/26/20 08:00 97.8 71 20 126/54 (78) 94   


 


4/26/20 04:00 97.3 72 20 131/55 (80) 92   


 


4/26/20 04:00  70      


 


4/26/20 00:00 97.9 74 19 128/59 (82) 93   


 


4/26/20 00:00  72      


 


4/25/20 21:00      Nasal Cannula 2.0 


 


4/25/20 20:00 97.3 70 21 121/61 (81) 94   


 


4/25/20 20:00  73      


 


4/25/20 16:00 98.8 100 20 100/56 (71) 96   


 


4/25/20 16:00  92      


 


4/25/20 12:00  72      


 


4/25/20 12:00 97.0 80 20 112/58 (76) 98   


 


4/25/20 09:00      Nasal Cannula 2.0 


 


4/25/20 08:55  77  99/51    


 


4/25/20 08:00 98.0 77 20 99/51 (67) 95   


 


4/25/20 08:00  78      

















Intake and Output  


 


 4/26/20 4/27/20





 19:00 07:00


 


Intake Total 300 ml 120 ml


 


Balance 300 ml 120 ml


 


  


 


Intake Oral 300 ml 120 ml


 


# Voids 2 1


 


# Bowel Movements 1 











Labs








Test


  4/24/20


06:50 4/25/20


17:00 4/26/20


06:40


 


White Blood Count


  12.9 K/UL


(4.8-10.8) 


  12.7 K/UL


(4.8-10.8)


 


Red Blood Count


  3.39 M/UL


(4.70-6.10) 


  3.30 M/UL


(4.70-6.10)


 


Hemoglobin


  9.6 G/DL


(14.2-18.0) 


  9.2 G/DL


(14.2-18.0)


 


Hematocrit


  29.2 %


(42.0-52.0) 


  28.9 %


(42.0-52.0)


 


Mean Corpuscular Volume 86 FL (80-99)   88 FL (80-99) 


 


Mean Corpuscular Hemoglobin


  28.4 PG


(27.0-31.0) 


  27.9 PG


(27.0-31.0)


 


Mean Corpuscular Hemoglobin


Concent 33.0 G/DL


(32.0-36.0) 


  31.8 G/DL


(32.0-36.0)


 


Red Cell Distribution Width


  13.1 %


(11.6-14.8) 


  13.5 %


(11.6-14.8)


 


Platelet Count


  254 K/UL


(150-450) 


  270 K/UL


(150-450)


 


Mean Platelet Volume


  7.4 FL


(6.5-10.1) 


  7.1 FL


(6.5-10.1)


 


Neutrophils (%) (Auto)


  75.2 %


(45.0-75.0) 


  76.3 %


(45.0-75.0)


 


Lymphocytes (%) (Auto)


  12.4 %


(20.0-45.0) 


  14.7 %


(20.0-45.0)


 


Monocytes (%) (Auto)


  9.8 %


(1.0-10.0) 


  6.7 %


(1.0-10.0)


 


Eosinophils (%) (Auto)


  1.8 %


(0.0-3.0) 


  1.7 %


(0.0-3.0)


 


Basophils (%) (Auto)


  0.8 %


(0.0-2.0) 


  0.6 %


(0.0-2.0)


 


Sodium Level


  133 MMOL/L


(136-145) 


  136 MMOL/L


(136-145)


 


Potassium Level


  4.2 MMOL/L


(3.5-5.1) 


  4.1 MMOL/L


(3.5-5.1)


 


Chloride Level


  95 MMOL/L


() 


  96 MMOL/L


()


 


Carbon Dioxide Level


  26 MMOL/L


(21-32) 


  29 MMOL/L


(21-32)


 


Anion Gap


  12 mmol/L


(5-15) 


  12 mmol/L


(5-15)


 


Blood Urea Nitrogen


  45 mg/dL


(7-18) 


  39 mg/dL


(7-18)


 


Creatinine


  9.3 MG/DL


(0.55-1.30) 


  8.6 MG/DL


(0.55-1.30)


 


Estimat Glomerular Filtration


Rate 5.5 mL/min


(>60) 


  6.0 mL/min


(>60)


 


Glucose Level


  99 MG/DL


() 


  84 MG/DL


()


 


Calcium Level


  8.7 MG/DL


(8.5-10.1) 


  9.0 MG/DL


(8.5-10.1)


 


Phosphorus Level


  3.3 MG/DL


(2.5-4.9) 


  


 


 


Total Bilirubin


  0.6 MG/DL


(0.2-1.0) 


  


 


 


Aspartate Amino Transf


(AST/SGOT) 25 U/L (15-37) 


  


  


 


 


Alanine Aminotransferase


(ALT/SGPT) 36 U/L (12-78) 


  


  


 


 


Alkaline Phosphatase


  110 U/L


() 


  


 


 


C-Reactive Protein,


Quantitative 4.0 mg/dL


(0.00-0.90) 


  


 


 


Total Protein


  7.3 G/DL


(6.4-8.2) 


  


 


 


Albumin


  2.5 G/DL


(3.4-5.0) 


  


 


 


Globulin 4.8 g/dL   


 


Albumin/Globulin Ratio 0.5 (1.0-2.7)   








Height (Feet):  5


Height (Inches):  5.00


Weight (Pounds):  150


Objective





Physical Exam


Vitals: reviewed


General: NAD


HEENT: nc, at


Neck: supple


Chest: clear breath sounds bilaterally, 2lnc


Cardiovascular: RRR, no s3, s4


Neuro: alert and oriented











Mt Staley MD Apr 27, 2020 06:30

## 2020-04-27 NOTE — GENERAL PROGRESS NOTE
Assessment/Plan


Assessment/Plan:


(1) ESRD of Hemodialysis


(2) Diabetes Mellitus


(3) Degenerative Joint disease


(4) COVID-19 positive


Patient to be continued on Tylenol as needed. 


D/w Dr. Mendes and he concurred.





Subjective


Date patient seen:  Apr 27, 2020


Time patient seen:  05:30 - pm


Constitutional:  Reports: no symptoms


HEENT:  Reports: no symptoms


Cardiovascular:  Reports: no symptoms


Respiratory:  Reports: no symptoms


Gastrointestinal/Abdominal:  Reports: no symptoms


Genitourinary:  Reports: no symptoms


Neurologic/Psychiatric:  Reports: no symptoms


Endocrine:  Reports: no symptoms


Hematologic/Lymphatic:  Reports: no symptoms


Allergies:  


Coded Allergies:  


     No Known Allergies (Unverified , 4/16/13)


Subjective


Patient is in bed dialysis done no c/o pain at this time





Objective





Last 24 Hour Vital Signs








  Date Time  Temp Pulse Resp B/P (MAP) Pulse Ox O2 Delivery O2 Flow Rate FiO2


 


4/27/20 12:00  61      


 


4/27/20 12:00 98.0 73 19 127/71 (89) 98   


 


4/27/20 09:00  86  121/61    


 


4/27/20 09:00      Room Air  


 


4/27/20 08:00  59      


 


4/27/20 08:00 98.6 86 19 121/61 (81) 98   


 


4/27/20 04:00 98.0 59 18 128/50 (76) 94   


 


4/27/20 04:00  54      


 


4/27/20 00:00  57      


 


4/27/20 00:00 98.1 91 19 130/49 (76) 96   


 


4/26/20 21:00      Nasal Cannula 2.0 


 


4/26/20 20:00 98.0 63 18 147/51 (83) 94   


 


4/26/20 20:00  67      

















Intake and Output  


 


 4/26/20 4/27/20





 19:00 07:00


 


Intake Total 300 ml 360 ml


 


Balance 300 ml 360 ml


 


  


 


Intake Oral 300 ml 360 ml


 


# Voids 2 1


 


# Bowel Movements 1 








Laboratory Tests


4/27/20 09:05: 


White Blood Count 13.4H, Red Blood Count 3.25L, Hemoglobin 9.2L, Hematocrit 

28.2L, Mean Corpuscular Volume 87, Mean Corpuscular Hemoglobin 28.3, Mean 

Corpuscular Hemoglobin Concent 32.8, Red Cell Distribution Width 12.9, Platelet 

Count 286, Mean Platelet Volume 6.6, Neutrophils (%) (Auto) 80.6H, Lymphocytes (

%) (Auto) 11.2L, Monocytes (%) (Auto) 7.4, Eosinophils (%) (Auto) 0.0, 

Basophils (%) (Auto) 0.8, Sodium Level 133L, Potassium Level 4.4, Chloride 

Level 95L, Carbon Dioxide Level 26, Anion Gap 12, Blood Urea Nitrogen 54H, 

Creatinine 10.4H, Estimat Glomerular Filtration Rate 4.8, Glucose Level 114H, 

Calcium Level 8.6, Phosphorus Level 3.1, Magnesium Level 2.2, Total Bilirubin 

0.4, Direct Bilirubin 0.2, Aspartate Amino Transf (AST/SGOT) 20, Alanine 

Aminotransferase (ALT/SGPT) 36, Alkaline Phosphatase 97, Total Protein 6.3L, 

Albumin 2.6L


Height (Feet):  5


Height (Inches):  5.00


Weight (Pounds):  150


General Appearance:  no apparent distress, alert


EENT:  PERRL/EOMI


Neck:  non-tender, normal alignment


Cardiovascular:  normal rate, regular rhythm


Respiratory/Chest:  decreased breath sounds


Abdomen:  non tender, soft


Extremities:  non-tender


Edema:  no edema noted Generalized


Neurologic:  alert, oriented x 3


Skin:  normal pigmentation











Meek Marte Apr 27, 2020 17:47

## 2020-04-27 NOTE — NUR
*****DISCHARGE PLANNING

FAMILY IS REQUESTING SNF PLACEMENT

MESSAGE LEFT FOR DR ROJO

OUTPATIENT DIALYSIS FOR COVID 19 PATIENT'S HAS BEEN LOOSELY ARRANGED BY 



**WAITING FOR RESPONSE FROM DR ROJO

-------------------------------------------------------------------------------

Addendum: 04/27/20 at 1302 by LANA ESTRADA LVN LVN

-------------------------------------------------------------------------------

ATTEMPTED TO LEAVE MESSAGE FOR IRVING Smith MERRITT KENYON  T: [phone] BUT WAS UNABLE


-------------------------------------------------------------------------------

Addendum: 04/27/20 at 1313 by LANA ESTRADA LVN LVN

-------------------------------------------------------------------------------

PER DR ROJO.....REFER PATIENT TO [facility]

-------------------------------------------------------------------------------

Addendum: 04/27/20 at 1409 by LANA ESTRADA LVN LVN

-------------------------------------------------------------------------------

FAXED CLINICALS TO 

[facility] T: [phone]

## 2020-04-28 VITALS — DIASTOLIC BLOOD PRESSURE: 69 MMHG | SYSTOLIC BLOOD PRESSURE: 113 MMHG

## 2020-04-28 VITALS — SYSTOLIC BLOOD PRESSURE: 96 MMHG | DIASTOLIC BLOOD PRESSURE: 42 MMHG

## 2020-04-28 VITALS — DIASTOLIC BLOOD PRESSURE: 56 MMHG | SYSTOLIC BLOOD PRESSURE: 117 MMHG

## 2020-04-28 VITALS — SYSTOLIC BLOOD PRESSURE: 117 MMHG | DIASTOLIC BLOOD PRESSURE: 66 MMHG

## 2020-04-28 VITALS — SYSTOLIC BLOOD PRESSURE: 110 MMHG | DIASTOLIC BLOOD PRESSURE: 59 MMHG

## 2020-04-28 VITALS — DIASTOLIC BLOOD PRESSURE: 63 MMHG | SYSTOLIC BLOOD PRESSURE: 98 MMHG

## 2020-04-28 LAB
ADD MANUAL DIFF: NO
ALBUMIN SERPL-MCNC: 2.7 G/DL (ref 3.4–5)
ALBUMIN/GLOB SERPL: 0.6 {RATIO} (ref 1–2.7)
ALP SERPL-CCNC: 102 U/L (ref 46–116)
ALT SERPL-CCNC: 39 U/L (ref 12–78)
ANION GAP SERPL CALC-SCNC: 10 MMOL/L (ref 5–15)
AST SERPL-CCNC: 30 U/L (ref 15–37)
BASOPHILS NFR BLD AUTO: 0.6 % (ref 0–2)
BILIRUB SERPL-MCNC: 0.4 MG/DL (ref 0.2–1)
BUN SERPL-MCNC: 35 MG/DL (ref 7–18)
CALCIUM SERPL-MCNC: 8.6 MG/DL (ref 8.5–10.1)
CHLORIDE SERPL-SCNC: 98 MMOL/L (ref 98–107)
CO2 SERPL-SCNC: 31 MMOL/L (ref 21–32)
CREAT SERPL-MCNC: 7.2 MG/DL (ref 0.55–1.3)
EOSINOPHIL NFR BLD AUTO: 0.1 % (ref 0–3)
ERYTHROCYTE [DISTWIDTH] IN BLOOD BY AUTOMATED COUNT: 13.7 % (ref 11.6–14.8)
GLOBULIN SER-MCNC: 4.8 G/DL
HCT VFR BLD CALC: 30.8 % (ref 42–52)
HGB BLD-MCNC: 10.1 G/DL (ref 14.2–18)
LYMPHOCYTES NFR BLD AUTO: 10.9 % (ref 20–45)
MCV RBC AUTO: 87 FL (ref 80–99)
MONOCYTES NFR BLD AUTO: 8.2 % (ref 1–10)
NEUTROPHILS NFR BLD AUTO: 80.1 % (ref 45–75)
PLATELET # BLD: 304 K/UL (ref 150–450)
POTASSIUM SERPL-SCNC: 4.1 MMOL/L (ref 3.5–5.1)
RBC # BLD AUTO: 3.55 M/UL (ref 4.7–6.1)
SODIUM SERPL-SCNC: 139 MMOL/L (ref 136–145)
WBC # BLD AUTO: 14 K/UL (ref 4.8–10.8)

## 2020-04-28 RX ADMIN — ENOXAPARIN SODIUM SCH MG: 30 INJECTION SUBCUTANEOUS at 09:40

## 2020-04-28 RX ADMIN — SEVELAMER CARBONATE SCH MG: 800 POWDER, FOR SUSPENSION ORAL at 13:00

## 2020-04-28 RX ADMIN — INSULIN ASPART SCH UNITS: 100 INJECTION, SOLUTION INTRAVENOUS; SUBCUTANEOUS at 16:30

## 2020-04-28 RX ADMIN — METOPROLOL SUCCINATE SCH MG: 50 TABLET, EXTENDED RELEASE ORAL at 09:35

## 2020-04-28 RX ADMIN — Medication SCH TAB: at 09:37

## 2020-04-28 RX ADMIN — CINACALCET HYDROCHLORIDE SCH MG: 30 TABLET, COATED ORAL at 09:36

## 2020-04-28 RX ADMIN — INSULIN ASPART SCH UNITS: 100 INJECTION, SOLUTION INTRAVENOUS; SUBCUTANEOUS at 11:30

## 2020-04-28 RX ADMIN — SEVELAMER CARBONATE SCH MG: 800 POWDER, FOR SUSPENSION ORAL at 09:36

## 2020-04-28 RX ADMIN — ASPIRIN SCH MG: 81 TABLET, DELAYED RELEASE ORAL at 09:36

## 2020-04-28 RX ADMIN — INSULIN ASPART SCH UNITS: 100 INJECTION, SOLUTION INTRAVENOUS; SUBCUTANEOUS at 05:38

## 2020-04-28 RX ADMIN — ATORVASTATIN CALCIUM SCH MG: 20 TABLET, FILM COATED ORAL at 21:30

## 2020-04-28 RX ADMIN — INSULIN ASPART SCH UNITS: 100 INJECTION, SOLUTION INTRAVENOUS; SUBCUTANEOUS at 21:00

## 2020-04-28 NOTE — GENERAL PROGRESS NOTE
Assessment/Plan


Problem List:  


(1) Hyperkalemia


ICD Codes:  E87.5 - Hyperkalemia


SNOMED:  96811636


(2) ESRD (end stage renal disease)


ICD Codes:  N18.6 - End stage renal disease


SNOMED:  15534486


(3) Suspected COVID-19 virus infection


ICD Codes:  R68.89 - Other general symptoms and signs


SNOMED:  959817205


Status:  progressing


Assessment/Plan:


covid positive


htn


esrd on hd


pna


persistent leukocytosis


depressed consulted dr pacheco


no fever


check lytes





Subjective


ROS Limited/Unobtainable:  Yes


Allergies:  


Coded Allergies:  


     No Known Allergies (Unverified , 4/16/13)





Objective





Last 24 Hour Vital Signs








  Date Time  Temp Pulse Resp B/P (MAP) Pulse Ox O2 Delivery O2 Flow Rate FiO2


 


4/28/20 16:00 96.7 86 19 113/69 (84) 98   


 


4/28/20 12:00  69      


 


4/28/20 12:00 97.5 71 18 110/59 (76) 96   


 


4/28/20 09:35  85  117/66    


 


4/28/20 09:00      Nasal Cannula 2.0 


 


4/28/20 08:00  85      


 


4/28/20 08:00 98.1 60 18 117/66 (83) 98   


 


4/28/20 04:00  79      


 


4/28/20 04:00 97.0 79 20 96/42 (60) 94   


 


4/28/20 00:00  93      


 


4/28/20 00:00 98.8 78 18 98/63 (75) 95   


 


4/27/20 21:00      Nasal Cannula 2.0 

















Intake and Output  


 


 4/27/20 4/28/20





 19:00 07:00


 


Intake Total 120 ml 200 ml


 


Output Total 3200 ml 


 


Balance -3080 ml 200 ml


 


  


 


Intake Oral 120 ml 200 ml


 


Output Urine Total 1200 ml 


 


Hemodialysis UF 2000 ml 


 


# Voids 3 








Laboratory Tests


4/28/20 05:40: 


White Blood Count 14.0H, Red Blood Count 3.55L, Hemoglobin 10.1L, Hematocrit 

30.8L, Mean Corpuscular Volume 87, Mean Corpuscular Hemoglobin 28.5, Mean 

Corpuscular Hemoglobin Concent 32.9, Red Cell Distribution Width 13.7, Platelet 

Count 304, Mean Platelet Volume 7.3, Neutrophils (%) (Auto) 80.1H, Lymphocytes (

%) (Auto) 10.9L, Monocytes (%) (Auto) 8.2, Eosinophils (%) (Auto) 0.1, 

Basophils (%) (Auto) 0.6, Sodium Level 139, Potassium Level 4.1, Chloride Level 

98, Carbon Dioxide Level 31, Anion Gap 10, Blood Urea Nitrogen 35H, Creatinine 

7.2H, Estimat Glomerular Filtration Rate 7.3, Glucose Level 86, Calcium Level 

8.6, Total Bilirubin 0.4, Aspartate Amino Transf (AST/SGOT) 30, Alanine 

Aminotransferase (ALT/SGPT) 39, Alkaline Phosphatase 102, Total Protein 7.5, 

Albumin 2.7L, Globulin 4.8, Albumin/Globulin Ratio 0.6L


Height (Feet):  5


Height (Inches):  5.00


Weight (Pounds):  150











Van Silva MD Apr 28, 2020 20:26

## 2020-04-28 NOTE — NUR
NURSE NOTES:

Received report from NI Lucas. Patient awake, alert, and responsive. AOx4. Noted to have no 
IV access, but has a left upper arm fistula for HD. Left arm precaution in place. On room 
air, saturating at 94%, but nasal cannula within reach as needed. 3rd COVID swab endorsed by 
Day shift nurse, will collect and send to lab ASAP. Droplet and contact precautions in 
place. Bed in lowest position, brakes engaged, bed rails raised x2. Call light and personal 
belongings placed within reach. Will continue to monitor.

## 2020-04-28 NOTE — NEPHROLOGY PROGRESS NOTE
Assessment/Plan


Problem List:  


(1) ESRD (end stage renal disease)


(2) Hyperkalemia


(3) COVID-19 virus infection


Assessment





End-stage renal disease on hemodialysis


Has arm fistula for dialysis-Next dialysis Wednesday, April 15


Dyspnea and shortness of breath, history of COPD, oxygen dependent at home


Exposure to COVID 19


Diabetes mellitus


Hypertension


Remote history of seizure


Anemia of kidney disease


Plan





COVID 19 test detected





Hemodialysis   next April 29


Keep the blood pressure and blood sugar in check


2D echocardiogram pending


Antibiotics and pulmonary support per consultants





Chest x-ray:


Findings: There is a left chest  HERO catheter, tip projected at the level of 

the


superior vena cava. Some atelectatic bands are seen in the left midlung. The 

lungs


and pleural spaces are otherwise clear. The heart size is upper limits of 

normal. A


stent is seen in the left axilla


Impression: Left midlung atelectasis. No acute process otherwise





Subjective


ROS Limited/Unobtainable:  No


Constitutional:  Reports: malaise





Objective


Objective





Last 24 Hour Vital Signs








  Date Time  Temp Pulse Resp B/P (MAP) Pulse Ox O2 Delivery O2 Flow Rate FiO2


 


4/28/20 09:35  85  117/66    


 


4/28/20 08:00  85      


 


4/28/20 08:00 98.1 60 18 117/66 (83) 98   


 


4/28/20 04:00  79      


 


4/28/20 04:00 97.0 79 20 96/42 (60) 94   


 


4/28/20 00:00  93      


 


4/28/20 00:00 98.8 78 18 98/63 (75) 95   


 


4/27/20 21:00      Nasal Cannula 2.0 


 


4/27/20 20:00 98.1 72 18 128/72 (90) 98   


 


4/27/20 20:00  79      


 


4/27/20 16:00 97.8 83 18 127/80 (96) 98   


 


4/27/20 16:00  62      

















Intake and Output  


 


 4/27/20 4/28/20





 19:00 07:00


 


Intake Total 120 ml 200 ml


 


Output Total 3200 ml 


 


Balance -3080 ml 200 ml


 


  


 


Intake Oral 120 ml 200 ml


 


Output Urine Total 1200 ml 


 


Hemodialysis UF 2000 ml 


 


# Voids 3 











Current Medications








 Medications


  (Trade)  Dose


 Ordered  Sig/Eliazar


 Route


 PRN Reason  Start Time


 Stop Time Status Last Admin


Dose Admin


 


 Acetaminophen


  (Tylenol)  500 mg  Q6H  PRN


 ORAL


 Temp >100.5  4/15/20 01:00


 5/15/20 00:59  4/21/20 10:34


 


 


 Acetaminophen


  (Tylenol)  650 mg  Q6H  PRN


 ORAL


 For Pain  4/24/20 16:01


 5/24/20 16:00  4/24/20 16:28


 


 


 Aspirin


  (Ecotrin)  81 mg  DAILY


 ORAL


   4/26/20 09:00


 6/10/20 08:59  4/28/20 09:36


 


 


 Atorvastatin


 Calcium


  (Lipitor)  20 mg  BEDTIME


 ORAL


   4/26/20 21:00


 7/25/20 20:59  4/27/20 20:49


 


 


 Cinacalcet


  (Sensipar)  30 mg  DAILY


 ORAL


   4/15/20 09:00


 7/14/20 08:59  4/28/20 09:36


 


 


 Dextrose


  (Dextrose 50%)  25 ml  Q30M  PRN


 IV


 Hypoglycemia  4/15/20 01:15


 7/14/20 01:14   


 


 


 Dextrose


  (Dextrose 50%)  50 ml  Q30M  PRN


 IV


 Hypoglycemia  4/15/20 01:15


 7/14/20 01:14   


 


 


 Enoxaparin Sodium


  (Lovenox)  30 mg  DAILY


 SUBQ


   4/21/20 09:00


 7/20/20 08:59  4/28/20 09:40


 


 


 Hydralazine HCl


  (Apresoline)  25 mg  Q4H  PRN


 ORAL


 bp over 160 syst  4/14/20 19:45


 7/13/20 19:44   


 


 


 Insulin Aspart


  (NovoLOG)    BEFORE MEALS AND  HS


 SUBQ


   4/15/20 06:30


 7/14/20 06:29  4/26/20 16:34


 


 


 Metoprolol


 Succinate


  (Toprol XL)  50 mg  DAILY


 ORAL


   4/26/20 09:00


 7/25/20 08:59  4/28/20 09:35


 


 


 Pantoprazole


  (Protonix)  40 mg  DAILY


 ORAL


   4/28/20 09:00


 5/28/20 08:59  4/28/20 09:36


 


 


 Sevelamer


 Carbonate


  (Renvela)  800 mg  THREE TIMES A  DAY


 ORAL


   4/28/20 18:00


 7/27/20 17:59   


 


 


 Vitamin B Complex/


 Vit C/Folic Acid


  (Nephrovite)  1 tab  DAILY


 ORAL


   4/15/20 09:00


 5/15/20 08:59  4/28/20 09:37


 





Laboratory Tests


4/28/20 05:40: 


White Blood Count 14.0H, Red Blood Count 3.55L, Hemoglobin 10.1L, Hematocrit 

30.8L, Mean Corpuscular Volume 87, Mean Corpuscular Hemoglobin 28.5, Mean 

Corpuscular Hemoglobin Concent 32.9, Red Cell Distribution Width 13.7, Platelet 

Count 304, Mean Platelet Volume 7.3, Neutrophils (%) (Auto) 80.1H, Lymphocytes (

%) (Auto) 10.9L, Monocytes (%) (Auto) 8.2, Eosinophils (%) (Auto) 0.1, 

Basophils (%) (Auto) 0.6, Sodium Level 139, Potassium Level 4.1, Chloride Level 

98, Carbon Dioxide Level 31, Anion Gap 10, Blood Urea Nitrogen 35H, Creatinine 

7.2H, Estimat Glomerular Filtration Rate 7.3, Glucose Level 86, Calcium Level 

8.6, Total Bilirubin 0.4, Aspartate Amino Transf (AST/SGOT) 30, Alanine 

Aminotransferase (ALT/SGPT) 39, Alkaline Phosphatase 102, Total Protein 7.5, 

Albumin 2.7L, Globulin 4.8, Albumin/Globulin Ratio 0.6L


Height (Feet):  5


Height (Inches):  5.00


Weight (Pounds):  150


General Appearance:  no apparent distress


Respiratory/Chest:  decreased breath sounds


Abdomen:  soft


Objective


no change











Vik Morrison MD Apr 28, 2020 14:32

## 2020-04-28 NOTE — NUR
****DISCHARGE PLANNING

PATIENT WAS REFERRED TO [facility]

SPOKE WITH ISMAEL THIS MORNING

PER BILL THEY HAVE A TEAM REVIEWING THE INFORMATION TO SEE IF THEY CAN ACCEPT NEW COVID 19 
PATIENTS

BILL WILL CALL BACK AS SOON AS HE HAS AN ANSWER REGARDING ACCEPTANCE OR DENIAL

-------------------------------------------------------------------------------

Addendum: 04/28/20 at 1210 by LANA ESTRADA LVN LVN

-------------------------------------------------------------------------------

MESSAGE LEFT FOR BILL REGARDING ACCEPTANCE DECISION....AWAIT RESPONSE

## 2020-04-28 NOTE — HEMATOLOGY/ONC PROGRESS NOTE
Assessment/Plan


Assessment/Plan


Asses/Recs


# Elevated D-dimer on admission, with sob, in this case r/o dvt of lower ext


--> less likely pe, first r/o dvt given more likely viral cause possible


--> obtain duplex of lower ext--> NEG FOR pe


--> repeat in future in 3mo


# Anemia of chronic disease due to underlying chronic medical issues, 

multifactorial v Gi bleed 


--> Anemia workup has been ordered, rule out gi bleed 


--> No evidence of hemolysis is noted, peripheral smear has been reviewed.


--> Hgb goal >7. Transfuse prn.


--> Epogen or iron at this time is not particularly indicated


--> Medications have been reviewed


--> hgb trend: 9.8 -->10-->10.8-->10.3-->9.6-->9.2


# Leukocytosis r/o infection


--> as per id


--> wbc trend 13


--> on levoflox


# Dyspnea


--> appears likely covid related


--> pulm aware


# ESRD (end stage renal disease)


--> hd as per Dr. Morrison


# Seizures.


# Hypertension.


# Diabetes mellitus.


# Dvt ppx lovenox sq





The timing of this note does not necessarily reflect the time of the patient 

was seen.





Greatly appreciate consultation.





Subjective


Constitutional:  Denies: no symptoms, chills, fever, malaise, weakness, other


HEENT:  Denies: no symptoms, eye pain, blurred vision, tearing, double vision, 

ear pain, ear discharge, nose pain, nose congestion, throat pain, throat 

swelling, mouth pain, mouth swelling, other


Cardiovascular:  Denies: no symptoms, chest pain, edema, irregular heart rate, 

lightheadedness, palpitations, syncope, other


Respiratory:  Denies: no symptoms, cough, shortness of breath, SOB with 

excertion, SOB at rest, sputum, wheezing, other


Gastrointestinal/Abdominal:  Denies: no symptoms, abdomen distended, abdominal 

pain, black stools, tarry stools, blood in stool, constipated, diarrhea, 

difficulty swallowing, nausea, poor appetite, poor fluid intake, rectal bleeding

, vomiting, other


Genitourinary:  Denies: no symptoms, burning, discharge, frequency, flank pain, 

hematuria, incontinence, pain, urgency, other


Neurologic/Psychiatric:  Denies: no symptoms, anxiety, depressed, emotional 

problems, headache, numbness, paresthesia, pre-existing deficit, seizure, 

tingling, tremors, weakness, other


Endocrine:  Denies: no symptoms, excessive sweating, flushing, intolerance to 

cold, intolerance to heat, increased hunger, increased thirst, increased urine, 

unexplained weight gain, unexplained weight loss, other


Allergies:  


Coded Allergies:  


     No Known Allergies (Unverified , 4/16/13)


Subjective


4/16 no bleeding , labs noted, hd as needed, renal aware


4/17 on tele, no acute events, nc 2l, sodium 135, hd for today 


4/19 eating breakfast, no overnight events, meds reviewed 


4/20 no events, no bleeding, no fc, no night sweats, hgb 10, hd today


4/21 no major changes, remains lucid, no bleeding, labs noted


4/22 labs noted, difficult stick, labs ordered, no bleeding


4/23 labs have been reviewed, no bleeding, smear is noted, no bleeding


4/24 no bleeding, no events, labs reviewed, labs noted, Surinamese speaking


4/26 no major changes, no bleeding, labs noted, hgb is 9.6, no hemolysis, on 2l 

nc


4/27 labs have been noted, reviewed, no bleeding, no night sweats


4/28 labs reviewed, no bleeding, meds reviewed, hd was done, hgb stable





Objective


Objective





Current Medications








 Medications


  (Trade)  Dose


 Ordered  Sig/Eliazar


 Route


 PRN Reason  Start Time


 Stop Time Status Last Admin


Dose Admin


 


 Acetaminophen


  (Tylenol)  500 mg  Q6H  PRN


 ORAL


 Temp >100.5  4/15/20 01:00


 5/15/20 00:59  4/21/20 10:34


 


 


 Acetaminophen


  (Tylenol)  650 mg  Q6H  PRN


 ORAL


 For Pain  4/24/20 16:01


 5/24/20 16:00  4/24/20 16:28


 


 


 Aspirin


  (Ecotrin)  81 mg  DAILY


 ORAL


   4/26/20 09:00


 6/10/20 08:59  4/26/20 09:14


 


 


 Atorvastatin


 Calcium


  (Lipitor)  20 mg  BEDTIME


 ORAL


   4/26/20 21:00


 7/25/20 20:59  4/27/20 20:49


 


 


 Cinacalcet


  (Sensipar)  30 mg  DAILY


 ORAL


   4/15/20 09:00


 7/14/20 08:59  4/26/20 09:14


 


 


 Dextrose


  (Dextrose 50%)  25 ml  Q30M  PRN


 IV


 Hypoglycemia  4/15/20 01:15


 7/14/20 01:14   


 


 


 Dextrose


  (Dextrose 50%)  50 ml  Q30M  PRN


 IV


 Hypoglycemia  4/15/20 01:15


 7/14/20 01:14   


 


 


 Enoxaparin Sodium


  (Lovenox)  30 mg  DAILY


 SUBQ


   4/21/20 09:00


 7/20/20 08:59  4/26/20 09:13


 


 


 Hydralazine HCl


  (Apresoline)  25 mg  Q4H  PRN


 ORAL


 bp over 160 syst  4/14/20 19:45


 7/13/20 19:44   


 


 


 Insulin Aspart


  (NovoLOG)    BEFORE MEALS AND  HS


 SUBQ


   4/15/20 06:30


 7/14/20 06:29  4/26/20 16:34


 


 


 Levofloxacin


  (Levaquin)  500 mg  EVERY OTHER  DAY


 ORAL


   4/28/20 09:00


 5/5/20 08:59   


 


 


 Metoprolol


 Succinate


  (Toprol XL)  50 mg  DAILY


 ORAL


   4/26/20 09:00


 7/25/20 08:59  4/26/20 09:14


 


 


 Pantoprazole


  (Protonix)  40 mg  DAILY


 ORAL


   4/28/20 09:00


 5/28/20 08:59   


 


 


 Sevelamer


 Carbonate


  (Renvela)  800 mg  THREE TIMES A  DAY


 ORAL


   4/15/20 09:00


 7/14/20 08:59  4/27/20 12:37


 


 


 Vitamin B Complex/


 Vit C/Folic Acid


  (Nephrovite)  1 tab  DAILY


 ORAL


   4/15/20 09:00


 5/15/20 08:59  4/27/20 09:54


 











Last 24 Hour Vital Signs








  Date Time  Temp Pulse Resp B/P (MAP) Pulse Ox O2 Delivery O2 Flow Rate FiO2


 


4/28/20 04:00  79      


 


4/28/20 04:00 97.0 79 20 96/42 (60) 94   


 


4/28/20 00:00  93      


 


4/28/20 00:00 98.8 78 18 98/63 (75) 95   


 


4/27/20 21:00      Nasal Cannula 2.0 


 


4/27/20 20:00 98.1 72 18 128/72 (90) 98   


 


4/27/20 20:00  79      


 


4/27/20 16:00 97.8 83 18 127/80 (96) 98   


 


4/27/20 16:00  62      


 


4/27/20 12:00  61      


 


4/27/20 12:00 98.0 73 19 127/71 (89) 98   


 


4/27/20 09:00  86  121/61    


 


4/27/20 09:00      Room Air  


 


4/27/20 08:00  59      


 


4/27/20 08:00 98.6 86 19 121/61 (81) 98   


 


4/27/20 04:00 98.0 59 18 128/50 (76) 94   


 


4/27/20 04:00  54      


 


4/27/20 00:00  57      


 


4/27/20 00:00 98.1 91 19 130/49 (76) 96   


 


4/26/20 21:00      Nasal Cannula 2.0 


 


4/26/20 20:00 98.0 63 18 147/51 (83) 94   


 


4/26/20 20:00  67      


 


4/26/20 16:00 97.9 72 20 122/54 (76) 94   


 


4/26/20 16:00  71      


 


4/26/20 12:00 98.1 72 20 113/47 (69) 93   


 


4/26/20 12:00  69      


 


4/26/20 09:14  71  126/54    


 


4/26/20 09:00      Nasal Cannula 2.0 


 


4/26/20 08:00  70      


 


4/26/20 08:00 97.8 71 20 126/54 (78) 94   

















Intake and Output  


 


 4/27/20 4/28/20





 19:00 07:00


 


Intake Total 120 ml 


 


Output Total 3200 ml 


 


Balance -3080 ml 


 


  


 


Intake Oral 120 ml 


 


Output Urine Total 1200 ml 


 


Hemodialysis UF 2000 ml 


 


# Voids 3 











Labs








Test


  4/25/20


17:00 4/26/20


06:40 4/27/20


09:05


 


Stool Occult Blood


  Negative


(NEGATIVE) 


  


 


 


White Blood Count


  


  12.7 K/UL


(4.8-10.8) 13.4 K/UL


(4.8-10.8)


 


Red Blood Count


  


  3.30 M/UL


(4.70-6.10) 3.25 M/UL


(4.70-6.10)


 


Hemoglobin


  


  9.2 G/DL


(14.2-18.0) 9.2 G/DL


(14.2-18.0)


 


Hematocrit


  


  28.9 %


(42.0-52.0) 28.2 %


(42.0-52.0)


 


Mean Corpuscular Volume  88 FL (80-99)  87 FL (80-99) 


 


Mean Corpuscular Hemoglobin


  


  27.9 PG


(27.0-31.0) 28.3 PG


(27.0-31.0)


 


Mean Corpuscular Hemoglobin


Concent 


  31.8 G/DL


(32.0-36.0) 32.8 G/DL


(32.0-36.0)


 


Red Cell Distribution Width


  


  13.5 %


(11.6-14.8) 12.9 %


(11.6-14.8)


 


Platelet Count


  


  270 K/UL


(150-450) 286 K/UL


(150-450)


 


Mean Platelet Volume


  


  7.1 FL


(6.5-10.1) 6.6 FL


(6.5-10.1)


 


Neutrophils (%) (Auto)


  


  76.3 %


(45.0-75.0) 80.6 %


(45.0-75.0)


 


Lymphocytes (%) (Auto)


  


  14.7 %


(20.0-45.0) 11.2 %


(20.0-45.0)


 


Monocytes (%) (Auto)


  


  6.7 %


(1.0-10.0) 7.4 %


(1.0-10.0)


 


Eosinophils (%) (Auto)


  


  1.7 %


(0.0-3.0) 0.0 %


(0.0-3.0)


 


Basophils (%) (Auto)


  


  0.6 %


(0.0-2.0) 0.8 %


(0.0-2.0)


 


Sodium Level


  


  136 MMOL/L


(136-145) 133 MMOL/L


(136-145)


 


Potassium Level


  


  4.1 MMOL/L


(3.5-5.1) 4.4 MMOL/L


(3.5-5.1)


 


Chloride Level


  


  96 MMOL/L


() 95 MMOL/L


()


 


Carbon Dioxide Level


  


  29 MMOL/L


(21-32) 26 MMOL/L


(21-32)


 


Anion Gap


  


  12 mmol/L


(5-15) 12 mmol/L


(5-15)


 


Blood Urea Nitrogen


  


  39 mg/dL


(7-18) 54 mg/dL


(7-18)


 


Creatinine


  


  8.6 MG/DL


(0.55-1.30) 10.4 MG/DL


(0.55-1.30)


 


Estimat Glomerular Filtration


Rate 


  6.0 mL/min


(>60) 4.8 mL/min


(>60)


 


Glucose Level


  


  84 MG/DL


() 114 MG/DL


()


 


Calcium Level


  


  9.0 MG/DL


(8.5-10.1) 8.6 MG/DL


(8.5-10.1)


 


Phosphorus Level


  


  


  3.1 MG/DL


(2.5-4.9)


 


Magnesium Level


  


  


  2.2 MG/DL


(1.8-2.4)


 


Total Bilirubin


  


  


  0.4 MG/DL


(0.2-1.0)


 


Direct Bilirubin


  


  


  0.2 MG/DL


(0.0-0.3)


 


Aspartate Amino Transf


(AST/SGOT) 


  


  20 U/L (15-37) 


 


 


Alanine Aminotransferase


(ALT/SGPT) 


  


  36 U/L (12-78) 


 


 


Alkaline Phosphatase


  


  


  97 U/L


()


 


Total Protein


  


  


  6.3 G/DL


(6.4-8.2)


 


Albumin


  


  


  2.6 G/DL


(3.4-5.0)








Height (Feet):  5


Height (Inches):  5.00


Weight (Pounds):  150


Objective





Physical Exam


Vitals: reviewed


General: NAD


HEENT: nc, at


Neck: supple


Chest: clear breath sounds bilaterally, 2lnc


Cardiovascular: RRR, no s3, s4


Neuro: alert and oriented











Mt Staley MD Apr 28, 2020 06:04

## 2020-04-28 NOTE — CARDIOLOGY PROGRESS NOTE
Assessment/Plan


Assessment/Plan


1. Trigeminy ventricular premature depolarizations.  Continue metoprolol XL.


2. Pulmonary edema due to COVID-19 infection, chronic diastolic heart failure 

can contribute to some extent, continue with hemodialysis. Continue metoprolol.


3. History of diabetes mellitus.  Continue aspirin and atorvastatin.


4. History of hypertension, continue metoprolol.





Subjective


Subjective


Sinus rhythm at rate of 86.





Objective





Last 24 Hour Vital Signs








  Date Time  Temp Pulse Resp B/P (MAP) Pulse Ox O2 Delivery O2 Flow Rate FiO2


 


4/28/20 16:00 96.7 86 19 113/69 (84) 98   


 


4/28/20 12:00  69      


 


4/28/20 12:00 97.5 71 18 110/59 (76) 96   


 


4/28/20 09:35  85  117/66    


 


4/28/20 09:00      Nasal Cannula 2.0 


 


4/28/20 08:00  85      


 


4/28/20 08:00 98.1 60 18 117/66 (83) 98   


 


4/28/20 04:00  79      


 


4/28/20 04:00 97.0 79 20 96/42 (60) 94   


 


4/28/20 00:00  93      


 


4/28/20 00:00 98.8 78 18 98/63 (75) 95   

















Intake and Output  


 


 4/27/20 4/28/20





 19:00 07:00


 


Intake Total 120 ml 200 ml


 


Output Total 3200 ml 


 


Balance -3080 ml 200 ml


 


  


 


Intake Oral 120 ml 200 ml


 


Output Urine Total 1200 ml 


 


Hemodialysis UF 2000 ml 


 


# Voids 3 











Laboratory Tests








Test


  4/28/20


05:40


 


White Blood Count


  14.0 K/UL


(4.8-10.8)  H


 


Red Blood Count


  3.55 M/UL


(4.70-6.10)  L


 


Hemoglobin


  10.1 G/DL


(14.2-18.0)  L


 


Hematocrit


  30.8 %


(42.0-52.0)  L


 


Mean Corpuscular Volume 87 FL (80-99)  


 


Mean Corpuscular Hemoglobin


  28.5 PG


(27.0-31.0)


 


Mean Corpuscular Hemoglobin


Concent 32.9 G/DL


(32.0-36.0)


 


Red Cell Distribution Width


  13.7 %


(11.6-14.8)


 


Platelet Count


  304 K/UL


(150-450)


 


Mean Platelet Volume


  7.3 FL


(6.5-10.1)


 


Neutrophils (%) (Auto)


  80.1 %


(45.0-75.0)  H


 


Lymphocytes (%) (Auto)


  10.9 %


(20.0-45.0)  L


 


Monocytes (%) (Auto)


  8.2 %


(1.0-10.0)


 


Eosinophils (%) (Auto)


  0.1 %


(0.0-3.0)


 


Basophils (%) (Auto)


  0.6 %


(0.0-2.0)


 


Sodium Level


  139 MMOL/L


(136-145)


 


Potassium Level


  4.1 MMOL/L


(3.5-5.1)


 


Chloride Level


  98 MMOL/L


()


 


Carbon Dioxide Level


  31 MMOL/L


(21-32)


 


Anion Gap


  10 mmol/L


(5-15)


 


Blood Urea Nitrogen


  35 mg/dL


(7-18)  H


 


Creatinine


  7.2 MG/DL


(0.55-1.30)  H


 


Estimat Glomerular Filtration


Rate 7.3 mL/min


(>60)


 


Glucose Level


  86 MG/DL


()


 


Calcium Level


  8.6 MG/DL


(8.5-10.1)


 


Total Bilirubin


  0.4 MG/DL


(0.2-1.0)


 


Aspartate Amino Transf


(AST/SGOT) 30 U/L (15-37)


 


 


Alanine Aminotransferase


(ALT/SGPT) 39 U/L (12-78)


 


 


Alkaline Phosphatase


  102 U/L


()


 


Total Protein


  7.5 G/DL


(6.4-8.2)


 


Albumin


  2.7 G/DL


(3.4-5.0)  L


 


Globulin 4.8 g/dL  


 


Albumin/Globulin Ratio


  0.6 (1.0-2.7)


L











Microbiology








 Date/Time


Source Procedure


Growth Status


 


 


 4/27/20 14:45


Stool Stool Culture


Pending Resulted


 


 4/27/20 14:45


Stool Clostridium difficile Toxin Assay - Final Resulted








Objective


HEENT:  Normocephalic, anicteric, PERRLA, EOMI, Pink conjunctiva.


NECK: No JVD, no carotid bruit.


CVS:  Normal S1S2, regular rate and rhythm, no murmurs, gallops or rubs.


LUNGS:  Clear B/L


ABDOMEN:  Soft and nontender/nondistended, + BS.


EXTREMITIES:  No edema, clubbing or cyanosis.


NEUROLOGIC:  Awake, alert, verbal.  No focal weakness.











Mike Todd MD Apr 28, 2020 21:52

## 2020-04-28 NOTE — NUR
RD ASSESSMENT & RECOMMENDATIONS

SEE CARE ACTIVITY FOR COMPLETE ASSESSMENT



DAILY ESTIMATED NEEDS:

Needs based on ESRD + HD/ 64kg abw 

25-30  kcals/kg 

5738-5244  total kcals

1.2-1.8  g protein/kg

  g total protein 

Fluids per MD, pt on HD  mL/kg

 total fluid mLs



NUTRITION DIAGNOSIS:

Altered nutrition related lab values R/T ESRD, h/o DM as evidenced by elev

creat (7.2), elev BNP (34440-> 88934), elev A1C 5.9.





CURRENT DIET:RENAL     



 

PO DIET RECOMMENDATIONS:

RENAL + CCHO MED/ texture as tolerated 

 





ADDITIONAL RECOMMENDATIONS:

* Daily calibrated bedscale wt 

* Monitor BGs, need for hypoglycemics: h/o DM-> now on cecelia 

* Add Nepro x 1 w/ variable PO intake (425kcal/19g prot each) 

* Monitor lytes

## 2020-04-28 NOTE — PULMONOLOGY PROGRESS NOTE
Assessment/Plan


Assessment/Plan


IMPRESSION:


1. History of COVID-19 exposure. Is + COVID 19 here


2. ESRD, on dialysis.


3. Diabetes mellitus.


4. Dyspnea.





DISCUSSION:  





Consider use of Plaquenil or azithromycin only if patient becomes hypoxic.


Currently SaO2 92-95% on RA alternating with 2L/min O2


Currently, he is doing well.  I would avoid unnecessary antibiotics.


Agree with ongoing dialysis.  I will follow carefully.














  ______________________________________________


  Oh Solomon M.D.





Subjective


ROS Limited/Unobtainable:  Yes


Interval Events:  + COVID 19; no new complaints


HEENT:  Repors: no symptoms


Respiratory:  Reports: no symptoms


Cardiovascular:  Reports: no symptoms


Gastrointestinal/Abdominal:  Reports: no symptoms


Allergies:  


Coded Allergies:  


     No Known Allergies (Unverified , 4/16/13)





Objective





Last 24 Hour Vital Signs








  Date Time  Temp Pulse Resp B/P (MAP) Pulse Ox O2 Delivery O2 Flow Rate FiO2


 


4/28/20 09:35  85  117/66    


 


4/28/20 08:00  85      


 


4/28/20 08:00 98.1 60 18 117/66 (83) 98   


 


4/28/20 04:00  79      


 


4/28/20 04:00 97.0 79 20 96/42 (60) 94   


 


4/28/20 00:00  93      


 


4/28/20 00:00 98.8 78 18 98/63 (75) 95   


 


4/27/20 21:00      Nasal Cannula 2.0 


 


4/27/20 20:00 98.1 72 18 128/72 (90) 98   


 


4/27/20 20:00  79      


 


4/27/20 16:00 97.8 83 18 127/80 (96) 98   


 


4/27/20 16:00  62      


 


4/27/20 12:00  61      


 


4/27/20 12:00 98.0 73 19 127/71 (89) 98   

















Intake and Output  


 


 4/27/20 4/28/20





 19:00 07:00


 


Intake Total 120 ml 200 ml


 


Output Total 3200 ml 


 


Balance -3080 ml 200 ml


 


  


 


Intake Oral 120 ml 200 ml


 


Output Urine Total 1200 ml 


 


Hemodialysis UF 2000 ml 


 


# Voids 3 








General Appearance:  no acute distress


HEENT:  mucous membranes moist


Respiratory/Chest:  chest wall non-tender, lungs clear


Cardiovascular:  normal peripheral pulses


Abdomen:  soft, non tender


Extremities:  no edema


Neurologic/Psychiatric:  other - sleeping





Microbiology








 Date/Time


Source Procedure


Growth Status


 


 


 4/27/20 14:45


Stool Stool Culture


Pending Resulted


 


 4/27/20 14:45


Stool Clostridium difficile Toxin Assay - Final Resulted








Laboratory Tests


4/28/20 05:40: 


White Blood Count 14.0H, Red Blood Count 3.55L, Hemoglobin 10.1L, Hematocrit 

30.8L, Mean Corpuscular Volume 87, Mean Corpuscular Hemoglobin 28.5, Mean 

Corpuscular Hemoglobin Concent 32.9, Red Cell Distribution Width 13.7, Platelet 

Count 304, Mean Platelet Volume 7.3, Neutrophils (%) (Auto) 80.1H, Lymphocytes (

%) (Auto) 10.9L, Monocytes (%) (Auto) 8.2, Eosinophils (%) (Auto) 0.1, 

Basophils (%) (Auto) 0.6, Sodium Level 139, Potassium Level 4.1, Chloride Level 

98, Carbon Dioxide Level 31, Anion Gap 10, Blood Urea Nitrogen 35H, Creatinine 

7.2H, Estimat Glomerular Filtration Rate 7.3, Glucose Level 86, Calcium Level 

8.6, Total Bilirubin 0.4, Aspartate Amino Transf (AST/SGOT) 30, Alanine 

Aminotransferase (ALT/SGPT) 39, Alkaline Phosphatase 102, Total Protein 7.5, 

Albumin 2.7L, Globulin 4.8, Albumin/Globulin Ratio 0.6L





Current Medications








 Medications


  (Trade)  Dose


 Ordered  Sig/Eliazar


 Route


 PRN Reason  Start Time


 Stop Time Status Last Admin


Dose Admin


 


 Acetaminophen


  (Tylenol)  500 mg  Q6H  PRN


 ORAL


 Temp >100.5  4/15/20 01:00


 5/15/20 00:59  4/21/20 10:34


 


 


 Acetaminophen


  (Tylenol)  650 mg  Q6H  PRN


 ORAL


 For Pain  4/24/20 16:01


 5/24/20 16:00  4/24/20 16:28


 


 


 Aspirin


  (Ecotrin)  81 mg  DAILY


 ORAL


   4/26/20 09:00


 6/10/20 08:59  4/28/20 09:36


 


 


 Atorvastatin


 Calcium


  (Lipitor)  20 mg  BEDTIME


 ORAL


   4/26/20 21:00


 7/25/20 20:59  4/27/20 20:49


 


 


 Cinacalcet


  (Sensipar)  30 mg  DAILY


 ORAL


   4/15/20 09:00


 7/14/20 08:59  4/28/20 09:36


 


 


 Dextrose


  (Dextrose 50%)  25 ml  Q30M  PRN


 IV


 Hypoglycemia  4/15/20 01:15


 7/14/20 01:14   


 


 


 Dextrose


  (Dextrose 50%)  50 ml  Q30M  PRN


 IV


 Hypoglycemia  4/15/20 01:15


 7/14/20 01:14   


 


 


 Enoxaparin Sodium


  (Lovenox)  30 mg  DAILY


 SUBQ


   4/21/20 09:00


 7/20/20 08:59  4/28/20 09:40


 


 


 Hydralazine HCl


  (Apresoline)  25 mg  Q4H  PRN


 ORAL


 bp over 160 syst  4/14/20 19:45


 7/13/20 19:44   


 


 


 Insulin Aspart


  (NovoLOG)    BEFORE MEALS AND  HS


 SUBQ


   4/15/20 06:30


 7/14/20 06:29  4/26/20 16:34


 


 


 Levofloxacin


  (Levaquin)  500 mg  EVERY OTHER  DAY


 ORAL


   4/28/20 09:00


 5/5/20 08:59  4/28/20 09:36


 


 


 Metoprolol


 Succinate


  (Toprol XL)  50 mg  DAILY


 ORAL


   4/26/20 09:00


 7/25/20 08:59  4/28/20 09:35


 


 


 Pantoprazole


  (Protonix)  40 mg  DAILY


 ORAL


   4/28/20 09:00


 5/28/20 08:59  4/28/20 09:36


 


 


 Sevelamer


 Carbonate


  (Renvela)  800 mg  THREE TIMES A  DAY


 ORAL


   4/15/20 09:00


 7/14/20 08:59  4/28/20 09:36


 


 


 Vitamin B Complex/


 Vit C/Folic Acid


  (Nephrovite)  1 tab  DAILY


 ORAL


   4/15/20 09:00


 5/15/20 08:59  4/28/20 09:37


 

















Oh Solomon MD Apr 28, 2020 10:54

## 2020-04-28 NOTE — GENERAL PROGRESS NOTE
Assessment/Plan


Assessment/Plan:


Assessment/Plan


Problems:  


(1) Abdominal pain


ICD Codes:  R10.9 - Unspecified abdominal pain


SNOMED:  34454929


(2) COVID-19 virus infection


ICD Codes:  U07.1 - COVID-19


SNOMED:  907789221


(3) Suspected COVID-19 virus infection


ICD Codes:  R68.89 - Other general symptoms and signs


SNOMED:  210452187


(4) ESRD (end stage renal disease)


ICD Codes:  N18.6 - End stage renal disease


SNOMED:  10714827


(5) Dyspnea


(6) Diarrhea


Assessment/Plan








Anemia most likely secondary to end-stage renal disease.


stool ob neg


- no plans for GI procedures unless emergent


-Monitor H&H, PRN transfusions


-PPI p.o. daily





stool studies for diarrhea





Subjective


Allergies:  


Coded Allergies:  


     No Known Allergies (Unverified , 4/16/13)





Objective





Last 24 Hour Vital Signs








  Date Time  Temp Pulse Resp B/P (MAP) Pulse Ox O2 Delivery O2 Flow Rate FiO2


 


4/28/20 04:00  79      


 


4/28/20 04:00 97.0 79 20 96/42 (60) 94   


 


4/28/20 00:00  93      


 


4/28/20 00:00 98.8 78 18 98/63 (75) 95   


 


4/27/20 21:00      Nasal Cannula 2.0 


 


4/27/20 20:00 98.1 72 18 128/72 (90) 98   


 


4/27/20 20:00  79      


 


4/27/20 16:00 97.8 83 18 127/80 (96) 98   


 


4/27/20 16:00  62      


 


4/27/20 12:00  61      


 


4/27/20 12:00 98.0 73 19 127/71 (89) 98   


 


4/27/20 09:00  86  121/61    


 


4/27/20 09:00      Room Air  


 


4/27/20 08:00  59      


 


4/27/20 08:00 98.6 86 19 121/61 (81) 98   

















Intake and Output  


 


 4/27/20 4/28/20





 19:00 07:00


 


Intake Total 120 ml 


 


Output Total 3200 ml 


 


Balance -3080 ml 


 


  


 


Intake Oral 120 ml 


 


Output Urine Total 1200 ml 


 


Hemodialysis UF 2000 ml 


 


# Voids 3 








Laboratory Tests


4/27/20 09:05: 


White Blood Count 13.4H, Red Blood Count 3.25L, Hemoglobin 9.2L, Hematocrit 

28.2L, Mean Corpuscular Volume 87, Mean Corpuscular Hemoglobin 28.3, Mean 

Corpuscular Hemoglobin Concent 32.8, Red Cell Distribution Width 12.9, Platelet 

Count 286, Mean Platelet Volume 6.6, Neutrophils (%) (Auto) 80.6H, Lymphocytes (

%) (Auto) 11.2L, Monocytes (%) (Auto) 7.4, Eosinophils (%) (Auto) 0.0, 

Basophils (%) (Auto) 0.8, Sodium Level 133L, Potassium Level 4.4, Chloride 

Level 95L, Carbon Dioxide Level 26, Anion Gap 12, Blood Urea Nitrogen 54H, 

Creatinine 10.4H, Estimat Glomerular Filtration Rate 4.8, Glucose Level 114H, 

Calcium Level 8.6, Phosphorus Level 3.1, Magnesium Level 2.2, Total Bilirubin 

0.4, Direct Bilirubin 0.2, Aspartate Amino Transf (AST/SGOT) 20, Alanine 

Aminotransferase (ALT/SGPT) 36, Alkaline Phosphatase 97, Total Protein 6.3L, 

Albumin 2.6L


Height (Feet):  5


Height (Inches):  5.00


Weight (Pounds):  150


General Appearance:  no apparent distress


EENT:  normal ENT inspection


Neck:  supple


Cardiovascular:  normal rate


Respiratory/Chest:  decreased breath sounds


Abdomen:  normal bowel sounds, non tender, soft


Extremities:  non-tender











Darrion Kessler MD Apr 28, 2020 06:24

## 2020-04-28 NOTE — NUR
HAND-OFF: 

Report given to Angelo DAN. Patient stable. Plan of care endorsed. Clarified with lab and 3rd 
COVID swab needs to be collected. Night shift RN aware.

## 2020-04-28 NOTE — NUR
NURSE NOTES:

Patient stable, AOx4 with no complaints, no pain and no s/sx of distress. RR even and 
unlabored on RA. Stating that he is depressed. Patient stated that he is willing to take 
antidepressants. Will speak with Dr. Silva. No IV access. Thrill and bruit present to LT AV 
shunt. Side rails upx2, call light within reach, bed low and locked. Will continue to 
monitor.

## 2020-04-28 NOTE — NUR
NURSE NOTES:

Patient stable, AOx4 with no complaints, no pain and no s/sx of distress. RR even and 
unlabored on RA. Breakfast provided and per patient he is done eating. Side rails upx2, call 
light within reach, bed low and locked. Will continue to monitor.

-------------------------------------------------------------------------------

Addendum: 04/28/20 at 0823 by BOBBY PIERRE RN

-------------------------------------------------------------------------------

*****DISREGARD. NOTE IS FOR DIFFERENT PATIENT*****

## 2020-04-28 NOTE — NUR
*****DISCHARGE PLANNED*****

PLAN IS FOR PATIENT TO DISCHARGE TO SNF TOMORROW AFTER DIALYSIS



[facility]

[address]

[place] [address]

T: [phone]

ROOM [address]    SKILLED

 ~ [facility] (CAN ACCOMMODATE COVID 19)

 [facility] [place]

[address]

[place] [address]

 ~ [place]

T:[phone]

CHAIR TIME ****TT 6:15 AM



TRANSPORTATION HAS BEEN ARRANGED WITH [facility] ~ SPOKE WITH [facility]

[facility] T: [phone]

THURSDAY 4/30/20 RESERVATION #[id number]

SATURDAY 5/2/20 RESERVATION #[id number]

TUESDAY 5/5/20 RESERVATION  #[id number]



TRANSPORTATION WILL  PATIENT AT 6:15 AM FROM SNF AND 10:30 FROM DIALYSIS CENTER 

PER DR POWELL PATIENT WILL BE DIALYZED TOMORROW AND THEN GO FOR A SHORT RUN ON THURSDAY

## 2020-04-28 NOTE — INFECTIOUS DISEASES PROG NOTE
Assessment/Plan


Assessment/Plan


IMPRESSION:


1. Leukocytosis


2. COVID19 disease.likely mild disease


    Positive: 4/14-4/23


3. COPD.


4. End-stage renal disease on hemodialysis.


5. Diabetes mellitus.


6. Hypertension.


7. Anemia.





RECOMMENDATION:  


Discontinue Levaquin


Will f/u COVID19 test 


C. difficile test: negative





Subjective


ROS Limited/Unobtainable:  Yes


Constitutional:  Reports: no symptoms


Respiratory:  Reports: no symptoms


Allergies:  


Coded Allergies:  


     No Known Allergies (Unverified , 4/16/13)





Objective


Vital Signs





Last 24 Hour Vital Signs








  Date Time  Temp Pulse Resp B/P (MAP) Pulse Ox O2 Delivery O2 Flow Rate FiO2


 


4/28/20 09:35  85  117/66    


 


4/28/20 08:00  85      


 


4/28/20 08:00 98.1 60 18 117/66 (83) 98   


 


4/28/20 04:00  79      


 


4/28/20 04:00 97.0 79 20 96/42 (60) 94   


 


4/28/20 00:00  93      


 


4/28/20 00:00 98.8 78 18 98/63 (75) 95   


 


4/27/20 21:00      Nasal Cannula 2.0 


 


4/27/20 20:00 98.1 72 18 128/72 (90) 98   


 


4/27/20 20:00  79      


 


4/27/20 16:00 97.8 83 18 127/80 (96) 98   


 


4/27/20 16:00  62      








Height (Feet):  5


Height (Inches):  5.00


Weight (Pounds):  150


General Appearance:  no acute distress


HEENT:  mucous membranes moist


Respiratory/Chest:  lungs clear


Cardiovascular:  normal rate


Abdomen:  soft, non tender


Extremities:  no edema


Neurologic/Psychiatric:  alert, responsive





Microbiology








 Date/Time


Source Procedure


Growth Status


 


 


 4/27/20 14:45


Stool Stool Culture


Pending Resulted


 


 4/27/20 14:45


Stool Clostridium difficile Toxin Assay - Final Resulted











Laboratory Tests








Test


  4/28/20


05:40


 


White Blood Count


  14.0 K/UL


(4.8-10.8)  H


 


Red Blood Count


  3.55 M/UL


(4.70-6.10)  L


 


Hemoglobin


  10.1 G/DL


(14.2-18.0)  L


 


Hematocrit


  30.8 %


(42.0-52.0)  L


 


Mean Corpuscular Volume 87 FL (80-99)  


 


Mean Corpuscular Hemoglobin


  28.5 PG


(27.0-31.0)


 


Mean Corpuscular Hemoglobin


Concent 32.9 G/DL


(32.0-36.0)


 


Red Cell Distribution Width


  13.7 %


(11.6-14.8)


 


Platelet Count


  304 K/UL


(150-450)


 


Mean Platelet Volume


  7.3 FL


(6.5-10.1)


 


Neutrophils (%) (Auto)


  80.1 %


(45.0-75.0)  H


 


Lymphocytes (%) (Auto)


  10.9 %


(20.0-45.0)  L


 


Monocytes (%) (Auto)


  8.2 %


(1.0-10.0)


 


Eosinophils (%) (Auto)


  0.1 %


(0.0-3.0)


 


Basophils (%) (Auto)


  0.6 %


(0.0-2.0)


 


Sodium Level


  139 MMOL/L


(136-145)


 


Potassium Level


  4.1 MMOL/L


(3.5-5.1)


 


Chloride Level


  98 MMOL/L


()


 


Carbon Dioxide Level


  31 MMOL/L


(21-32)


 


Anion Gap


  10 mmol/L


(5-15)


 


Blood Urea Nitrogen


  35 mg/dL


(7-18)  H


 


Creatinine


  7.2 MG/DL


(0.55-1.30)  H


 


Estimat Glomerular Filtration


Rate 7.3 mL/min


(>60)


 


Glucose Level


  86 MG/DL


()


 


Calcium Level


  8.6 MG/DL


(8.5-10.1)


 


Total Bilirubin


  0.4 MG/DL


(0.2-1.0)


 


Aspartate Amino Transf


(AST/SGOT) 30 U/L (15-37)


 


 


Alanine Aminotransferase


(ALT/SGPT) 39 U/L (12-78)


 


 


Alkaline Phosphatase


  102 U/L


()


 


Total Protein


  7.5 G/DL


(6.4-8.2)


 


Albumin


  2.7 G/DL


(3.4-5.0)  L


 


Globulin 4.8 g/dL  


 


Albumin/Globulin Ratio


  0.6 (1.0-2.7)


L











Current Medications








 Medications


  (Trade)  Dose


 Ordered  Sig/Eliazar


 Route


 PRN Reason  Start Time


 Stop Time Status Last Admin


Dose Admin


 


 Acetaminophen


  (Tylenol)  500 mg  Q6H  PRN


 ORAL


 Temp >100.5  4/15/20 01:00


 5/15/20 00:59  4/21/20 10:34


 


 


 Acetaminophen


  (Tylenol)  650 mg  Q6H  PRN


 ORAL


 For Pain  4/24/20 16:01


 5/24/20 16:00  4/24/20 16:28


 


 


 Aspirin


  (Ecotrin)  81 mg  DAILY


 ORAL


   4/26/20 09:00


 6/10/20 08:59  4/28/20 09:36


 


 


 Atorvastatin


 Calcium


  (Lipitor)  20 mg  BEDTIME


 ORAL


   4/26/20 21:00


 7/25/20 20:59  4/27/20 20:49


 


 


 Cinacalcet


  (Sensipar)  30 mg  DAILY


 ORAL


   4/15/20 09:00


 7/14/20 08:59  4/28/20 09:36


 


 


 Dextrose


  (Dextrose 50%)  25 ml  Q30M  PRN


 IV


 Hypoglycemia  4/15/20 01:15


 7/14/20 01:14   


 


 


 Dextrose


  (Dextrose 50%)  50 ml  Q30M  PRN


 IV


 Hypoglycemia  4/15/20 01:15


 7/14/20 01:14   


 


 


 Enoxaparin Sodium


  (Lovenox)  30 mg  DAILY


 SUBQ


   4/21/20 09:00


 7/20/20 08:59  4/28/20 09:40


 


 


 Hydralazine HCl


  (Apresoline)  25 mg  Q4H  PRN


 ORAL


 bp over 160 syst  4/14/20 19:45


 7/13/20 19:44   


 


 


 Insulin Aspart


  (NovoLOG)    BEFORE MEALS AND  HS


 SUBQ


   4/15/20 06:30


 7/14/20 06:29  4/26/20 16:34


 


 


 Levofloxacin


  (Levaquin)  500 mg  EVERY OTHER  DAY


 ORAL


   4/28/20 09:00


 5/5/20 08:59  4/28/20 09:36


 


 


 Metoprolol


 Succinate


  (Toprol XL)  50 mg  DAILY


 ORAL


   4/26/20 09:00


 7/25/20 08:59  4/28/20 09:35


 


 


 Pantoprazole


  (Protonix)  40 mg  DAILY


 ORAL


   4/28/20 09:00


 5/28/20 08:59  4/28/20 09:36


 


 


 Sevelamer


 Carbonate


  (Renvela)  800 mg  THREE TIMES A  DAY


 ORAL


   4/15/20 09:00


 7/14/20 08:59  4/28/20 09:36


 


 


 Vitamin B Complex/


 Vit C/Folic Acid


  (Nephrovite)  1 tab  DAILY


 ORAL


   4/15/20 09:00


 5/15/20 08:59  4/28/20 09:37


 

















Jake Pfeiffer MD Apr 28, 2020 12:11

## 2020-04-28 NOTE — GENERAL PROGRESS NOTE
Assessment/Plan


Assessment/Plan:


(1) ESRD of Hemodialysis


(2) Diabetes Mellitus


(3) Degenerative Joint disease


(4) COVID-19 positive


Patient to be continued on Tylenol as needed. 


D/w Dr. Mendes and he concurred.





Subjective


Date patient seen:  Apr 28, 2020


Time patient seen:  03:00 - pm


Allergies:  


Coded Allergies:  


     No Known Allergies (Unverified , 4/16/13)


Subjective


Constitutional:  Reports: no symptoms


HEENT:  Reports: no symptoms


Cardiovascular:  Reports: no symptoms


Respiratory:  Reports: no symptoms


Gastrointestinal/Abdominal:  Reports: no symptoms


Genitourinary:  Reports: no symptoms


Neurologic/Psychiatric:  Reports: no symptoms


Endocrine:  Reports: no symptoms


Hematologic/Lymphatic:  Reports: no symptoms





Subjective


Patient doing well, no signs of pain or distress. 


No c/o pain with dialysis, d/w nurse.





Objective





Last 24 Hour Vital Signs








  Date Time  Temp Pulse Resp B/P (MAP) Pulse Ox O2 Delivery O2 Flow Rate FiO2


 


4/28/20 09:35  85  117/66    


 


4/28/20 08:00  85      


 


4/28/20 08:00 98.1 60 18 117/66 (83) 98   


 


4/28/20 04:00  79      


 


4/28/20 04:00 97.0 79 20 96/42 (60) 94   


 


4/28/20 00:00  93      


 


4/28/20 00:00 98.8 78 18 98/63 (75) 95   


 


4/27/20 21:00      Nasal Cannula 2.0 


 


4/27/20 20:00 98.1 72 18 128/72 (90) 98   


 


4/27/20 20:00  79      


 


4/27/20 16:00 97.8 83 18 127/80 (96) 98   


 


4/27/20 16:00  62      

















Intake and Output  


 


 4/27/20 4/28/20





 19:00 07:00


 


Intake Total 120 ml 200 ml


 


Output Total 3200 ml 


 


Balance -3080 ml 200 ml


 


  


 


Intake Oral 120 ml 200 ml


 


Output Urine Total 1200 ml 


 


Hemodialysis UF 2000 ml 


 


# Voids 3 








Laboratory Tests


4/28/20 05:40: 


White Blood Count 14.0H, Red Blood Count 3.55L, Hemoglobin 10.1L, Hematocrit 

30.8L, Mean Corpuscular Volume 87, Mean Corpuscular Hemoglobin 28.5, Mean 

Corpuscular Hemoglobin Concent 32.9, Red Cell Distribution Width 13.7, Platelet 

Count 304, Mean Platelet Volume 7.3, Neutrophils (%) (Auto) 80.1H, Lymphocytes (

%) (Auto) 10.9L, Monocytes (%) (Auto) 8.2, Eosinophils (%) (Auto) 0.1, 

Basophils (%) (Auto) 0.6, Sodium Level 139, Potassium Level 4.1, Chloride Level 

98, Carbon Dioxide Level 31, Anion Gap 10, Blood Urea Nitrogen 35H, Creatinine 

7.2H, Estimat Glomerular Filtration Rate 7.3, Glucose Level 86, Calcium Level 

8.6, Total Bilirubin 0.4, Aspartate Amino Transf (AST/SGOT) 30, Alanine 

Aminotransferase (ALT/SGPT) 39, Alkaline Phosphatase 102, Total Protein 7.5, 

Albumin 2.7L, Globulin 4.8, Albumin/Globulin Ratio 0.6L


Height (Feet):  5


Height (Inches):  5.00


Weight (Pounds):  150


Objective


General Appearance:  no apparent distress, alert


Neck:  non-tender, normal alignment


Cardiovascular:  normal rate, regular rhythm


Respiratory/Chest:  decreased breath sounds


Abdomen:  non tender, soft


Extremities:  non-tender


Edema:  no edema noted Generalized


Neurologic:  alert, oriented x 3


Skin:  normal pigmentation











Meek Marte Apr 28, 2020 15:24

## 2020-04-29 VITALS — DIASTOLIC BLOOD PRESSURE: 71 MMHG | SYSTOLIC BLOOD PRESSURE: 108 MMHG

## 2020-04-29 VITALS — SYSTOLIC BLOOD PRESSURE: 102 MMHG | DIASTOLIC BLOOD PRESSURE: 69 MMHG

## 2020-04-29 VITALS — SYSTOLIC BLOOD PRESSURE: 110 MMHG | DIASTOLIC BLOOD PRESSURE: 74 MMHG

## 2020-04-29 VITALS — DIASTOLIC BLOOD PRESSURE: 70 MMHG | SYSTOLIC BLOOD PRESSURE: 118 MMHG

## 2020-04-29 VITALS — DIASTOLIC BLOOD PRESSURE: 43 MMHG | SYSTOLIC BLOOD PRESSURE: 103 MMHG

## 2020-04-29 LAB
ADD MANUAL DIFF: NO
BASOPHILS NFR BLD AUTO: 0.6 % (ref 0–2)
EOSINOPHIL NFR BLD AUTO: 0.1 % (ref 0–3)
ERYTHROCYTE [DISTWIDTH] IN BLOOD BY AUTOMATED COUNT: 13.5 % (ref 11.6–14.8)
HCT VFR BLD CALC: 28.6 % (ref 42–52)
HGB BLD-MCNC: 9.3 G/DL (ref 14.2–18)
LYMPHOCYTES NFR BLD AUTO: 12.8 % (ref 20–45)
MCV RBC AUTO: 87 FL (ref 80–99)
MONOCYTES NFR BLD AUTO: 9.6 % (ref 1–10)
NEUTROPHILS NFR BLD AUTO: 76.9 % (ref 45–75)
PLATELET # BLD: 285 K/UL (ref 150–450)
RBC # BLD AUTO: 3.29 M/UL (ref 4.7–6.1)
WBC # BLD AUTO: 15.1 K/UL (ref 4.8–10.8)

## 2020-04-29 RX ADMIN — INSULIN ASPART SCH UNITS: 100 INJECTION, SOLUTION INTRAVENOUS; SUBCUTANEOUS at 11:30

## 2020-04-29 RX ADMIN — INSULIN ASPART SCH UNITS: 100 INJECTION, SOLUTION INTRAVENOUS; SUBCUTANEOUS at 06:30

## 2020-04-29 RX ADMIN — METOPROLOL SUCCINATE SCH MG: 50 TABLET, EXTENDED RELEASE ORAL at 09:00

## 2020-04-29 RX ADMIN — ASPIRIN SCH MG: 81 TABLET, DELAYED RELEASE ORAL at 09:00

## 2020-04-29 RX ADMIN — ENOXAPARIN SODIUM SCH MG: 30 INJECTION SUBCUTANEOUS at 09:00

## 2020-04-29 RX ADMIN — Medication SCH TAB: at 09:23

## 2020-04-29 RX ADMIN — ENOXAPARIN SODIUM SCH MG: 30 INJECTION SUBCUTANEOUS at 09:25

## 2020-04-29 RX ADMIN — INSULIN ASPART SCH UNITS: 100 INJECTION, SOLUTION INTRAVENOUS; SUBCUTANEOUS at 16:30

## 2020-04-29 RX ADMIN — CINACALCET HYDROCHLORIDE SCH MG: 30 TABLET, COATED ORAL at 09:22

## 2020-04-29 NOTE — NEPHROLOGY PROGRESS NOTE
Assessment/Plan


Problem List:  


(1) ESRD (end stage renal disease)


(2) Hyperkalemia


(3) COVID-19 virus infection


Assessment





End-stage renal disease on hemodialysis


Has arm fistula for dialysis-Next dialysis Wednesday, April 15


Dyspnea and shortness of breath, history of COPD, oxygen dependent at home


Exposure to COVID 19


Diabetes mellitus


Hypertension


Remote history of seizure


Anemia of kidney disease


Plan





COVID 19 test detected





Hemodialysis   next April 29


Keep the blood pressure and blood sugar in check


2D echocardiogram pending


Antibiotics and pulmonary support per consultants





Chest x-ray:


Findings: There is a left chest  HERO catheter, tip projected at the level of 

the


superior vena cava. Some atelectatic bands are seen in the left midlung. The 

lungs


and pleural spaces are otherwise clear. The heart size is upper limits of 

normal. A


stent is seen in the left axilla


Impression: Left midlung atelectasis. No acute process otherwise





Subjective


ROS Limited/Unobtainable:  No


Constitutional:  Reports: malaise





Objective


Objective





Last 24 Hour Vital Signs








  Date Time  Temp Pulse Resp B/P (MAP) Pulse Ox O2 Delivery O2 Flow Rate FiO2


 


4/29/20 08:08      Nasal Cannula 2.0 


 


4/29/20 08:00  67      


 


4/29/20 08:00 98.6 73 19 108/71 (83) 95   


 


4/29/20 04:00  61      


 


4/29/20 04:00 98.2 69 19 102/69 (80) 94   


 


4/29/20 00:00 98.0 69 19 103/43 (63) 95   


 


4/29/20 00:00  66      


 


4/28/20 21:00      Nasal Cannula 2.0 


 


4/28/20 20:00 98.0 70 18 117/56 (76) 94   


 


4/28/20 20:00  78      


 


4/28/20 16:00 96.7 86 19 113/69 (84) 98   

















Intake and Output  


 


 4/28/20 4/29/20





 19:00 07:00


 


Intake Total 1200 ml 


 


Balance 1200 ml 


 


  


 


Intake Oral 1200 ml 


 


# Voids  1











Current Medications








 Medications


  (Trade)  Dose


 Ordered  Sig/Eliazar


 Route


 PRN Reason  Start Time


 Stop Time Status Last Admin


Dose Admin


 


 Acetaminophen


  (Tylenol)  500 mg  Q6H  PRN


 ORAL


 Temp >100.5  4/15/20 01:00


 5/15/20 00:59  4/21/20 10:34


 


 


 Acetaminophen


  (Tylenol)  650 mg  Q6H  PRN


 ORAL


 For Pain  4/24/20 16:01


 5/24/20 16:00  4/24/20 16:28


 


 


 Aspirin


  (Ecotrin)  81 mg  DAILY


 ORAL


   4/26/20 09:00


 6/10/20 08:59  4/28/20 09:36


 


 


 Atorvastatin


 Calcium


  (Lipitor)  20 mg  BEDTIME


 ORAL


   4/26/20 21:00


 7/25/20 20:59  4/28/20 21:30


 


 


 Cinacalcet


  (Sensipar)  30 mg  DAILY


 ORAL


   4/15/20 09:00


 7/14/20 08:59  4/29/20 09:22


 


 


 Dextrose


  (Dextrose 50%)  25 ml  Q30M  PRN


 IV


 Hypoglycemia  4/15/20 01:15


 7/14/20 01:14   


 


 


 Dextrose


  (Dextrose 50%)  50 ml  Q30M  PRN


 IV


 Hypoglycemia  4/15/20 01:15


 7/14/20 01:14   


 


 


 Enoxaparin Sodium


  (Lovenox)  30 mg  DAILY


 SUBQ


   4/21/20 09:00


 7/20/20 08:59  4/28/20 09:40


 


 


 Escitalopram


 Oxalate


  (Lexapro)  10 mg  DAILY


 ORAL


   4/29/20 12:45


 5/29/20 12:44   


 


 


 Hydralazine HCl


  (Apresoline)  25 mg  Q4H  PRN


 ORAL


 bp over 160 syst  4/14/20 19:45


 7/13/20 19:44   


 


 


 Insulin Aspart


  (NovoLOG)    BEFORE MEALS AND  HS


 SUBQ


   4/15/20 06:30


 7/14/20 06:29  4/26/20 16:34


 


 


 Metoprolol


 Succinate


  (Toprol XL)  50 mg  DAILY


 ORAL


   4/26/20 09:00


 7/25/20 08:59  4/28/20 09:35


 


 


 Mirtazapine


  (Remeron)  7.5 mg  BEDTIME


 ORAL


   4/29/20 21:00


 7/28/20 20:59   


 


 


 Pantoprazole


  (Protonix)  40 mg  DAILY


 ORAL


   4/28/20 09:00


 5/28/20 08:59  4/29/20 09:22


 


 


 Sevelamer


 Carbonate


  (Renvela)  800 mg  THREE TIMES A  DAY


 ORAL


   4/28/20 18:00


 7/27/20 17:59  4/29/20 09:22


 


 


 Vitamin B Complex/


 Vit C/Folic Acid


  (Nephrovite)  1 tab  DAILY


 ORAL


   4/15/20 09:00


 5/15/20 08:59  4/29/20 09:23


 





Laboratory Tests


4/29/20 04:35: 


White Blood Count 15.1H, Red Blood Count 3.29L, Hemoglobin 9.3L, Hematocrit 

28.6L, Mean Corpuscular Volume 87, Mean Corpuscular Hemoglobin 28.2, Mean 

Corpuscular Hemoglobin Concent 32.4, Red Cell Distribution Width 13.5, Platelet 

Count 285, Mean Platelet Volume 7.4, Neutrophils (%) (Auto) 76.9H, Lymphocytes (

%) (Auto) 12.8L, Monocytes (%) (Auto) 9.6, Eosinophils (%) (Auto) 0.1, 

Basophils (%) (Auto) 0.6


Height (Feet):  5


Height (Inches):  5.00


Weight (Pounds):  150


General Appearance:  no apparent distress


Objective


no change











Vik Morrison MD Apr 29, 2020 13:44

## 2020-04-29 NOTE — NUR
****DISCHARGE PLANNING

PATIENT HAS BEEN CLEARED BY DR POWELL, DR EDWARD AND DR COOK FOR DISCHARGE



SPOKE WITH PATIENT'S COUSIN, MERRITT PRESCOTT T: [phone]

PER MERRITT, PATIENT'S WIFE  ...NOT SURE OF CAUSE OF DEATH AND PATIENT'S SON IS 
COVID POSITIVE ON A VENTILATOR AT [facility]

## 2020-04-29 NOTE — GENERAL PROGRESS NOTE
Assessment/Plan


Status:  progressing


Assessment/Plan:


Assessment/Plan


Problems:  


(1) Abdominal pain


ICD Codes:  R10.9 - Unspecified abdominal pain


SNOMED:  10719499


(2) COVID-19 virus infection


ICD Codes:  U07.1 - COVID-19


SNOMED:  220081081


(3) Suspected COVID-19 virus infection


ICD Codes:  R68.89 - Other general symptoms and signs


SNOMED:  336502132


(4) ESRD (end stage renal disease)


ICD Codes:  N18.6 - End stage renal disease


SNOMED:  22240300


(5) Dyspnea


(6) Diarrhea


Assessment/Plan








Anemia most likely secondary to end-stage renal disease.


stool ob neg


- no plans for GI procedures unless emergent


-Monitor H&H, PRN transfusions


-PPI p.o. daily





stool studies for diarrhea





Subjective


ROS Limited/Unobtainable:  No


Allergies:  


Coded Allergies:  


     No Known Allergies (Unverified , 4/16/13)





Objective





Last 24 Hour Vital Signs








  Date Time  Temp Pulse Resp B/P (MAP) Pulse Ox O2 Delivery O2 Flow Rate FiO2


 


4/29/20 08:08      Nasal Cannula 2.0 


 


4/29/20 04:00  61      


 


4/29/20 04:00 98.2 69 19 102/69 (80) 94   


 


4/29/20 00:00 98.0 69 19 103/43 (63) 95   


 


4/29/20 00:00  66      


 


4/28/20 21:00      Nasal Cannula 2.0 


 


4/28/20 20:00 98.0 70 18 117/56 (76) 94   


 


4/28/20 20:00  78      


 


4/28/20 16:00 96.7 86 19 113/69 (84) 98   


 


4/28/20 12:00  69      


 


4/28/20 12:00 97.5 71 18 110/59 (76) 96   


 


4/28/20 09:35  85  117/66    

















Intake and Output  


 


 4/28/20 4/29/20





 19:00 07:00


 


Intake Total 1200 ml 


 


Balance 1200 ml 


 


  


 


Intake Oral 1200 ml 


 


# Voids  1








Laboratory Tests


4/29/20 04:35: 


White Blood Count 15.1H, Red Blood Count 3.29L, Hemoglobin 9.3L, Hematocrit 

28.6L, Mean Corpuscular Volume 87, Mean Corpuscular Hemoglobin 28.2, Mean 

Corpuscular Hemoglobin Concent 32.4, Red Cell Distribution Width 13.5, Platelet 

Count 285, Mean Platelet Volume 7.4, Neutrophils (%) (Auto) 76.9H, Lymphocytes (

%) (Auto) 12.8L, Monocytes (%) (Auto) 9.6, Eosinophils (%) (Auto) 0.1, 

Basophils (%) (Auto) 0.6


Height (Feet):  5


Height (Inches):  5.00


Weight (Pounds):  150


General Appearance:  alert


EENT:  normal ENT inspection


Neck:  supple


Cardiovascular:  normal rate


Respiratory/Chest:  decreased breath sounds


Abdomen:  normal bowel sounds, non tender, soft


Extremities:  non-tender











Darrion Kessler MD Apr 29, 2020 09:03

## 2020-04-29 NOTE — CARDIOLOGY PROGRESS NOTE
Assessment/Plan


Assessment/Plan


1. Trigeminy ventricular premature depolarizations.  Continue metoprolol XL.


2. Pulmonary edema due to COVID-19 infection, chronic diastolic heart failure 

can contribute to some extent, continue with hemodialysis. Continue metoprolol.


3. History of diabetes mellitus.  Continue aspirin and atorvastatin.


4. History of hypertension, continue metoprolol.





Subjective


Subjective


Sinus rhythm at rate of 96.





Objective





Last 24 Hour Vital Signs








  Date Time  Temp Pulse Resp B/P (MAP) Pulse Ox O2 Delivery O2 Flow Rate FiO2


 


4/29/20 16:00  96      


 


4/29/20 16:00 98.6 72 18 118/70 (86) 97   


 


4/29/20 12:00  68      


 


4/29/20 12:00 98.4 77 18 110/74 (86) 96   


 


4/29/20 08:08      Nasal Cannula 2.0 


 


4/29/20 08:00  67      


 


4/29/20 08:00 98.6 73 19 108/71 (83) 95   


 


4/29/20 04:00  61      


 


4/29/20 04:00 98.2 69 19 102/69 (80) 94   


 


4/29/20 00:00 98.0 69 19 103/43 (63) 95   


 


4/29/20 00:00  66      

















Intake and Output  


 


 4/28/20 4/29/20





 19:00 07:00


 


Intake Total 1200 ml 


 


Balance 1200 ml 


 


  


 


Intake Oral 1200 ml 


 


# Voids  1











Laboratory Tests








Test


  4/29/20


04:35


 


White Blood Count


  15.1 K/UL


(4.8-10.8)  H


 


Red Blood Count


  3.29 M/UL


(4.70-6.10)  L


 


Hemoglobin


  9.3 G/DL


(14.2-18.0)  L


 


Hematocrit


  28.6 %


(42.0-52.0)  L


 


Mean Corpuscular Volume 87 FL (80-99)  


 


Mean Corpuscular Hemoglobin


  28.2 PG


(27.0-31.0)


 


Mean Corpuscular Hemoglobin


Concent 32.4 G/DL


(32.0-36.0)


 


Red Cell Distribution Width


  13.5 %


(11.6-14.8)


 


Platelet Count


  285 K/UL


(150-450)


 


Mean Platelet Volume


  7.4 FL


(6.5-10.1)


 


Neutrophils (%) (Auto)


  76.9 %


(45.0-75.0)  H


 


Lymphocytes (%) (Auto)


  12.8 %


(20.0-45.0)  L


 


Monocytes (%) (Auto)


  9.6 %


(1.0-10.0)


 


Eosinophils (%) (Auto)


  0.1 %


(0.0-3.0)


 


Basophils (%) (Auto)


  0.6 %


(0.0-2.0)











Microbiology








 Date/Time


Source Procedure


Growth Status


 


 


 4/27/20 14:45


Stool Stool Culture


Pending Resulted


 


 4/27/20 14:45


Stool Clostridium difficile Toxin Assay - Final Resulted








Objective


HEENT:  Normocephalic, anicteric, PERRLA, EOMI, Pink conjunctiva.


NECK: No JVD, no carotid bruit.


CVS:  Normal S1S2, regular rate and rhythm, no murmurs, gallops or rubs.


LUNGS:  Clear B/L


ABDOMEN:  Soft and nontender/nondistended, + BS.


EXTREMITIES:  No edema, clubbing or cyanosis.


NEUROLOGIC:  Awake, alert, verbal.  No focal weakness.











Mike Todd MD Apr 29, 2020 22:52

## 2020-04-29 NOTE — NUR
****DISCHARGE PLANNED

PATIENT IS DISCHARGING TO:



[facility]

SKILLED

T: [phone] FOR NURSE TO NURSE REPORT

[facility] HAS BEEN ARRANGED FOR 1730 PICK

SPOKE WITH PATIENT'S BROTHER AND NEPHEW AND BOTH ARE IN AGREEMENT WITH DISCHARGE PLAN

## 2020-04-29 NOTE — HEMATOLOGY/ONC PROGRESS NOTE
Assessment/Plan


Assessment/Plan


Asses/Recs


# Elevated D-dimer on admission, with sob, in this case r/o dvt of lower ext


--> less likely pe, first r/o dvt given more likely viral cause possible


--> obtain duplex of lower ext--> NEG FOR pe


--> repeat in future in 3mo


# Anemia of chronic disease due to underlying chronic medical issues, 

multifactorial v Gi bleed 


--> Anemia workup has been ordered, rule out gi bleed 


--> No evidence of hemolysis is noted, peripheral smear has been reviewed.


--> Hgb goal >7. Transfuse prn.


--> Epogen or iron at this time is not particularly indicated


--> Medications have been reviewed


--> hgb trend: 9.8 -->10-->10.8-->10.3-->9.6-->9.2-->10


# Leukocytosis r/o infection


--> as per id


--> wbc trend 13-->14


--> on levoflox


# Dyspnea


--> appears likely covid related


--> pulm aware


# ESRD (end stage renal disease)


--> hd as per Dr. Morrison


# Seizures.


# Hypertension.


# Diabetes mellitus.


# Dvt ppx lovenox sq





The timing of this note does not necessarily reflect the time of the patient 

was seen.





Greatly appreciate consultation.





Subjective


Constitutional:  Denies: no symptoms, chills, fever, malaise, weakness, other


HEENT:  Denies: no symptoms, eye pain, blurred vision, tearing, double vision, 

ear pain, ear discharge, nose pain, nose congestion, throat pain, throat 

swelling, mouth pain, mouth swelling, other


Respiratory:  Denies: no symptoms, cough, shortness of breath, SOB with 

excertion, SOB at rest, sputum, wheezing, other


Gastrointestinal/Abdominal:  Denies: no symptoms, abdomen distended, abdominal 

pain, black stools, tarry stools, blood in stool, constipated, diarrhea, 

difficulty swallowing, nausea, poor appetite, poor fluid intake, rectal bleeding

, vomiting, other


Genitourinary:  Denies: no symptoms, burning, discharge, frequency, flank pain, 

hematuria, incontinence, pain, urgency, other


Neurologic/Psychiatric:  Denies: no symptoms, anxiety, depressed, emotional 

problems, headache, numbness, paresthesia, pre-existing deficit, seizure, 

tingling, tremors, weakness, other


Endocrine:  Denies: no symptoms, excessive sweating, flushing, intolerance to 

cold, intolerance to heat, increased hunger, increased thirst, increased urine, 

unexplained weight gain, unexplained weight loss, other


Hematologic/Lymphatic:  Denies: no symptoms, anemia, easy bleeding, easy 

bruising, adenopathy, other


Allergies:  


Coded Allergies:  


     No Known Allergies (Unverified , 4/16/13)


Subjective


4/16 no bleeding , labs noted, hd as needed, renal aware


4/17 on tele, no acute events, nc 2l, sodium 135, hd for today 


4/19 eating breakfast, no overnight events, meds reviewed 


4/20 no events, no bleeding, no fc, no night sweats, hgb 10, hd today


4/21 no major changes, remains lucid, no bleeding, labs noted


4/22 labs noted, difficult stick, labs ordered, no bleeding


4/23 labs have been reviewed, no bleeding, smear is noted, no bleeding


4/24 no bleeding, no events, labs reviewed, labs noted, Slov[place] speaking


4/26 no major changes, no bleeding, labs noted, hgb is 9.6, no hemolysis, on 2l 

nc


4/27 labs have been noted, reviewed, no bleeding, no night sweats


4/28 labs reviewed, no bleeding, meds reviewed, hd was done, hgb stable


4/29 getting hd prn, on 2-3l nc today, no bleeding, restless





Objective


Objective





Current Medications








 Medications


  (Trade)  Dose


 Ordered  Sig/Eliazar


 Route


 PRN Reason  Start Time


 Stop Time Status Last Admin


Dose Admin


 


 Acetaminophen


  (Tylenol)  500 mg  Q6H  PRN


 ORAL


 Temp >100.5  4/15/20 01:00


 5/15/20 00:59  4/21/20 10:34


 


 


 Acetaminophen


  (Tylenol)  650 mg  Q6H  PRN


 ORAL


 For Pain  4/24/20 16:01


 5/24/20 16:00  4/24/20 16:28


 


 


 Aspirin


  (Ecotrin)  81 mg  DAILY


 ORAL


   4/26/20 09:00


 6/10/20 08:59  4/28/20 09:36


 


 


 Atorvastatin


 Calcium


  (Lipitor)  20 mg  BEDTIME


 ORAL


   4/26/20 21:00


 7/25/20 20:59  4/28/20 21:30


 


 


 Cinacalcet


  (Sensipar)  30 mg  DAILY


 ORAL


   4/15/20 09:00


 7/14/20 08:59  4/28/20 09:36


 


 


 Dextrose


  (Dextrose 50%)  25 ml  Q30M  PRN


 IV


 Hypoglycemia  4/15/20 01:15


 7/14/20 01:14   


 


 


 Dextrose


  (Dextrose 50%)  50 ml  Q30M  PRN


 IV


 Hypoglycemia  4/15/20 01:15


 7/14/20 01:14   


 


 


 Enoxaparin Sodium


  (Lovenox)  30 mg  DAILY


 SUBQ


   4/21/20 09:00


 7/20/20 08:59  4/28/20 09:40


 


 


 Hydralazine HCl


  (Apresoline)  25 mg  Q4H  PRN


 ORAL


 bp over 160 syst  4/14/20 19:45


 7/13/20 19:44   


 


 


 Insulin Aspart


  (NovoLOG)    BEFORE MEALS AND  HS


 SUBQ


   4/15/20 06:30


 7/14/20 06:29  4/26/20 16:34


 


 


 Metoprolol


 Succinate


  (Toprol XL)  50 mg  DAILY


 ORAL


   4/26/20 09:00


 7/25/20 08:59  4/28/20 09:35


 


 


 Pantoprazole


  (Protonix)  40 mg  DAILY


 ORAL


   4/28/20 09:00


 5/28/20 08:59  4/28/20 09:36


 


 


 Sevelamer


 Carbonate


  (Renvela)  800 mg  THREE TIMES A  DAY


 ORAL


   4/28/20 18:00


 7/27/20 17:59  4/28/20 17:57


 


 


 Vitamin B Complex/


 Vit C/Folic Acid


  (Nephrovite)  1 tab  DAILY


 ORAL


   4/15/20 09:00


 5/15/20 08:59  4/28/20 09:37


 











Last 24 Hour Vital Signs








  Date Time  Temp Pulse Resp B/P (MAP) Pulse Ox O2 Delivery O2 Flow Rate FiO2


 


4/29/20 04:00 98.2 69 19 102/69 (80) 94   


 


4/29/20 00:00 98.0 69 19 103/43 (63) 95   


 


4/29/20 00:00  66      


 


4/28/20 21:00      Nasal Cannula 2.0 


 


4/28/20 20:00 98.0 70 18 117/56 (76) 94   


 


4/28/20 20:00  78      


 


4/28/20 16:00 96.7 86 19 113/69 (84) 98   


 


4/28/20 12:00  69      


 


4/28/20 12:00 97.5 71 18 110/59 (76) 96   


 


4/28/20 09:35  85  117/66    


 


4/28/20 09:00      Nasal Cannula 2.0 


 


4/28/20 08:00  85      


 


4/28/20 08:00 98.1 60 18 117/66 (83) 98   


 


4/28/20 04:00  79      


 


4/28/20 04:00 97.0 79 20 96/42 (60) 94   


 


4/28/20 00:00  93      


 


4/28/20 00:00 98.8 78 18 98/63 (75) 95   


 


4/27/20 21:00      Nasal Cannula 2.0 


 


4/27/20 20:00 98.1 72 18 128/72 (90) 98   


 


4/27/20 20:00  79      


 


4/27/20 16:00 97.8 83 18 127/80 (96) 98   


 


4/27/20 16:00  62      


 


4/27/20 12:00  61      


 


4/27/20 12:00 98.0 73 19 127/71 (89) 98   


 


4/27/20 09:00  86  121/61    


 


4/27/20 09:00      Room Air  


 


4/27/20 08:00  59      


 


4/27/20 08:00 98.6 86 19 121/61 (81) 98   

















Intake and Output  


 


 4/28/20 4/29/20





 19:00 07:00


 


Intake Total 1200 ml 


 


Balance 1200 ml 


 


  


 


Intake Oral 1200 ml 











Labs








Test


  4/26/20


06:40 4/27/20


09:05 4/28/20


05:40 4/29/20


04:35


 


White Blood Count


  12.7 K/UL


(4.8-10.8) 13.4 K/UL


(4.8-10.8) 14.0 K/UL


(4.8-10.8) 


 


 


Red Blood Count


  3.30 M/UL


(4.70-6.10) 3.25 M/UL


(4.70-6.10) 3.55 M/UL


(4.70-6.10) 


 


 


Hemoglobin


  9.2 G/DL


(14.2-18.0) 9.2 G/DL


(14.2-18.0) 10.1 G/DL


(14.2-18.0) 


 


 


Hematocrit


  28.9 %


(42.0-52.0) 28.2 %


(42.0-52.0) 30.8 %


(42.0-52.0) 


 


 


Mean Corpuscular Volume 88 FL (80-99)  87 FL (80-99)  87 FL (80-99)  


 


Mean Corpuscular Hemoglobin


  27.9 PG


(27.0-31.0) 28.3 PG


(27.0-31.0) 28.5 PG


(27.0-31.0) 


 


 


Mean Corpuscular Hemoglobin


Concent 31.8 G/DL


(32.0-36.0) 32.8 G/DL


(32.0-36.0) 32.9 G/DL


(32.0-36.0) 


 


 


Red Cell Distribution Width


  13.5 %


(11.6-14.8) 12.9 %


(11.6-14.8) 13.7 %


(11.6-14.8) 


 


 


Platelet Count


  270 K/UL


(150-450) 286 K/UL


(150-450) 304 K/UL


(150-450) 


 


 


Mean Platelet Volume


  7.1 FL


(6.5-10.1) 6.6 FL


(6.5-10.1) 7.3 FL


(6.5-10.1) 


 


 


Neutrophils (%) (Auto)


  76.3 %


(45.0-75.0) 80.6 %


(45.0-75.0) 80.1 %


(45.0-75.0) 


 


 


Lymphocytes (%) (Auto)


  14.7 %


(20.0-45.0) 11.2 %


(20.0-45.0) 10.9 %


(20.0-45.0) 


 


 


Monocytes (%) (Auto)


  6.7 %


(1.0-10.0) 7.4 %


(1.0-10.0) 8.2 %


(1.0-10.0) 


 


 


Eosinophils (%) (Auto)


  1.7 %


(0.0-3.0) 0.0 %


(0.0-3.0) 0.1 %


(0.0-3.0) 


 


 


Basophils (%) (Auto)


  0.6 %


(0.0-2.0) 0.8 %


(0.0-2.0) 0.6 %


(0.0-2.0) 


 


 


Sodium Level


  136 MMOL/L


(136-145) 133 MMOL/L


(136-145) 139 MMOL/L


(136-145) 


 


 


Potassium Level


  4.1 MMOL/L


(3.5-5.1) 4.4 MMOL/L


(3.5-5.1) 4.1 MMOL/L


(3.5-5.1) 


 


 


Chloride Level


  96 MMOL/L


() 95 MMOL/L


() 98 MMOL/L


() 


 


 


Carbon Dioxide Level


  29 MMOL/L


(21-32) 26 MMOL/L


(21-32) 31 MMOL/L


(21-32) 


 


 


Anion Gap


  12 mmol/L


(5-15) 12 mmol/L


(5-15) 10 mmol/L


(5-15) 


 


 


Blood Urea Nitrogen


  39 mg/dL


(7-18) 54 mg/dL


(7-18) 35 mg/dL


(7-18) 


 


 


Creatinine


  8.6 MG/DL


(0.55-1.30) 10.4 MG/DL


(0.55-1.30) 7.2 MG/DL


(0.55-1.30) 


 


 


Estimat Glomerular Filtration


Rate 6.0 mL/min


(>60) 4.8 mL/min


(>60) 7.3 mL/min


(>60) 


 


 


Glucose Level


  84 MG/DL


() 114 MG/DL


() 86 MG/DL


() 


 


 


Calcium Level


  9.0 MG/DL


(8.5-10.1) 8.6 MG/DL


(8.5-10.1) 8.6 MG/DL


(8.5-10.1) 


 


 


Phosphorus Level


  


  3.1 MG/DL


(2.5-4.9) 


  


 


 


Magnesium Level


  


  2.2 MG/DL


(1.8-2.4) 


  


 


 


Total Bilirubin


  


  0.4 MG/DL


(0.2-1.0) 0.4 MG/DL


(0.2-1.0) 


 


 


Direct Bilirubin


  


  0.2 MG/DL


(0.0-0.3) 


  


 


 


Aspartate Amino Transf


(AST/SGOT) 


  20 U/L (15-37) 


  30 U/L (15-37) 


  


 


 


Alanine Aminotransferase


(ALT/SGPT) 


  36 U/L (12-78) 


  39 U/L (12-78) 


  


 


 


Alkaline Phosphatase


  


  97 U/L


() 102 U/L


() 


 


 


Total Protein


  


  6.3 G/DL


(6.4-8.2) 7.5 G/DL


(6.4-8.2) 


 


 


Albumin


  


  2.6 G/DL


(3.4-5.0) 2.7 G/DL


(3.4-5.0) 


 


 


Globulin   4.8 g/dL  


 


Albumin/Globulin Ratio   0.6 (1.0-2.7)  








Height (Feet):  5


Height (Inches):  5.00


Weight (Pounds):  150


Objective





Physical Exam


Vitals: reviewed


General: NAD


HEENT: nc, at


Neck: supple


Chest: clear breath sounds bilaterally, 2lnc


Cardiovascular: RRR, no s3, s4


Neuro: alert and oriented











Mt Staley MD Apr 29, 2020 05:57

## 2020-04-29 NOTE — NUR
NURSE NOTES:

Received report from Shayy HAWKINS RN. Pt in stable condition, ready for discharge. Will 
continue to monitor closely.

## 2020-04-29 NOTE — INFECTIOUS DISEASES PROG NOTE
Assessment/Plan


Assessment/Plan


IMPRESSION:


1. Leukocytosis


2. COVID19 disease.likely mild disease


    Positive: 4/14-4/23


3. COPD.


4. End-stage renal disease on hemodialysis.


5. Diabetes mellitus.


6. Hypertension.


7. Anemia.





RECOMMENDATION:  


Will f/u COVID19 test 


C. difficile test: negative





Subjective


ROS Limited/Unobtainable:  Yes


Cardiovascular:  Reports: other - on bedside HD


Allergies:  


Coded Allergies:  


     No Known Allergies (Unverified , 4/16/13)





Objective


Vital Signs





Last 24 Hour Vital Signs








  Date Time  Temp Pulse Resp B/P (MAP) Pulse Ox O2 Delivery O2 Flow Rate FiO2


 


4/29/20 08:08      Nasal Cannula 2.0 


 


4/29/20 08:00  67      


 


4/29/20 08:00 98.6 73 19 108/71 (83) 95   


 


4/29/20 04:00  61      


 


4/29/20 04:00 98.2 69 19 102/69 (80) 94   


 


4/29/20 00:00 98.0 69 19 103/43 (63) 95   


 


4/29/20 00:00  66      


 


4/28/20 21:00      Nasal Cannula 2.0 


 


4/28/20 20:00 98.0 70 18 117/56 (76) 94   


 


4/28/20 20:00  78      


 


4/28/20 16:00 96.7 86 19 113/69 (84) 98   








Height (Feet):  5


Height (Inches):  5.00


Weight (Pounds):  150


General Appearance:  no acute distress


HEENT:  mucous membranes moist


Respiratory/Chest:  lungs clear


Cardiovascular:  normal rate


Abdomen:  soft, non tender


Neurologic/Psychiatric:  alert, responsive





Microbiology








 Date/Time


Source Procedure


Growth Status


 


 


 4/27/20 14:45


Stool Stool Culture


Pending Resulted


 


 4/27/20 14:45


Stool Clostridium difficile Toxin Assay - Final Resulted











Laboratory Tests








Test


  4/29/20


04:35


 


White Blood Count


  15.1 K/UL


(4.8-10.8)  H


 


Red Blood Count


  3.29 M/UL


(4.70-6.10)  L


 


Hemoglobin


  9.3 G/DL


(14.2-18.0)  L


 


Hematocrit


  28.6 %


(42.0-52.0)  L


 


Mean Corpuscular Volume 87 FL (80-99)  


 


Mean Corpuscular Hemoglobin


  28.2 PG


(27.0-31.0)


 


Mean Corpuscular Hemoglobin


Concent 32.4 G/DL


(32.0-36.0)


 


Red Cell Distribution Width


  13.5 %


(11.6-14.8)


 


Platelet Count


  285 K/UL


(150-450)


 


Mean Platelet Volume


  7.4 FL


(6.5-10.1)


 


Neutrophils (%) (Auto)


  76.9 %


(45.0-75.0)  H


 


Lymphocytes (%) (Auto)


  12.8 %


(20.0-45.0)  L


 


Monocytes (%) (Auto)


  9.6 %


(1.0-10.0)


 


Eosinophils (%) (Auto)


  0.1 %


(0.0-3.0)


 


Basophils (%) (Auto)


  0.6 %


(0.0-2.0)











Current Medications








 Medications


  (Trade)  Dose


 Ordered  Sig/Eliazar


 Route


 PRN Reason  Start Time


 Stop Time Status Last Admin


Dose Admin


 


 Acetaminophen


  (Tylenol)  500 mg  Q6H  PRN


 ORAL


 Temp >100.5  4/15/20 01:00


 5/15/20 00:59  4/21/20 10:34


 


 


 Acetaminophen


  (Tylenol)  650 mg  Q6H  PRN


 ORAL


 For Pain  4/24/20 16:01


 5/24/20 16:00  4/24/20 16:28


 


 


 Aspirin


  (Ecotrin)  81 mg  DAILY


 ORAL


   4/26/20 09:00


 6/10/20 08:59  4/28/20 09:36


 


 


 Atorvastatin


 Calcium


  (Lipitor)  20 mg  BEDTIME


 ORAL


   4/26/20 21:00


 7/25/20 20:59  4/28/20 21:30


 


 


 Cinacalcet


  (Sensipar)  30 mg  DAILY


 ORAL


   4/15/20 09:00


 7/14/20 08:59  4/29/20 09:22


 


 


 Dextrose


  (Dextrose 50%)  25 ml  Q30M  PRN


 IV


 Hypoglycemia  4/15/20 01:15


 7/14/20 01:14   


 


 


 Dextrose


  (Dextrose 50%)  50 ml  Q30M  PRN


 IV


 Hypoglycemia  4/15/20 01:15


 7/14/20 01:14   


 


 


 Enoxaparin Sodium


  (Lovenox)  30 mg  DAILY


 SUBQ


   4/21/20 09:00


 7/20/20 08:59  4/28/20 09:40


 


 


 Escitalopram


 Oxalate


  (Lexapro)  10 mg  DAILY


 ORAL


   4/29/20 12:45


 5/29/20 12:44   


 


 


 Hydralazine HCl


  (Apresoline)  25 mg  Q4H  PRN


 ORAL


 bp over 160 syst  4/14/20 19:45


 7/13/20 19:44   


 


 


 Insulin Aspart


  (NovoLOG)    BEFORE MEALS AND  HS


 SUBQ


   4/15/20 06:30


 7/14/20 06:29  4/26/20 16:34


 


 


 Metoprolol


 Succinate


  (Toprol XL)  50 mg  DAILY


 ORAL


   4/26/20 09:00


 7/25/20 08:59  4/28/20 09:35


 


 


 Pantoprazole


  (Protonix)  40 mg  DAILY


 ORAL


   4/28/20 09:00


 5/28/20 08:59  4/29/20 09:22


 


 


 Sevelamer


 Carbonate


  (Renvela)  800 mg  THREE TIMES A  DAY


 ORAL


   4/28/20 18:00


 7/27/20 17:59  4/29/20 09:22


 


 


 Vitamin B Complex/


 Vit C/Folic Acid


  (Nephrovite)  1 tab  DAILY


 ORAL


   4/15/20 09:00


 5/15/20 08:59  4/29/20 09:23


 

















Jake Pfeiffer MD Apr 29, 2020 12:47

## 2020-04-29 NOTE — GENERAL PROGRESS NOTE
Assessment/Plan


Assessment/Plan:


(1) ESRD of Hemodialysis


(2) Diabetes Mellitus


(3) Degenerative Joint disease


(4) COVID-19 positive


Patient to be continued on Tylenol as needed. 


D/w Dr. Mendes and he concurred.





Subjective


Date patient seen:  Apr 29, 2020


Time patient seen:  05:00 - pm


Allergies:  


Coded Allergies:  


     No Known Allergies (Unverified , 4/16/13)


Subjective


Constitutional:  Reports: no symptoms


HEENT:  Reports: no symptoms


Cardiovascular:  Reports: no symptoms


Respiratory:  Reports: no symptoms


Gastrointestinal/Abdominal:  Reports: no symptoms


Genitourinary:  Reports: no symptoms


Neurologic/Psychiatric:  Reports: no symptoms


Endocrine:  Reports: no symptoms


Hematologic/Lymphatic:  Reports: no symptoms





Subjective


Patient is in bed and denies pain. No new complaints at this time.





Objective





Last 24 Hour Vital Signs








  Date Time  Temp Pulse Resp B/P (MAP) Pulse Ox O2 Delivery O2 Flow Rate FiO2


 


4/29/20 08:08      Nasal Cannula 2.0 


 


4/29/20 08:00  67      


 


4/29/20 08:00 98.6 73 19 108/71 (83) 95   


 


4/29/20 04:00  61      


 


4/29/20 04:00 98.2 69 19 102/69 (80) 94   


 


4/29/20 00:00 98.0 69 19 103/43 (63) 95   


 


4/29/20 00:00  66      


 


4/28/20 21:00      Nasal Cannula 2.0 


 


4/28/20 20:00 98.0 70 18 117/56 (76) 94   


 


4/28/20 20:00  78      

















Intake and Output  


 


 4/28/20 4/29/20





 19:00 07:00


 


Intake Total 1200 ml 


 


Balance 1200 ml 


 


  


 


Intake Oral 1200 ml 


 


# Voids  1








Laboratory Tests


4/29/20 04:35: 


White Blood Count 15.1H, Red Blood Count 3.29L, Hemoglobin 9.3L, Hematocrit 

28.6L, Mean Corpuscular Volume 87, Mean Corpuscular Hemoglobin 28.2, Mean 

Corpuscular Hemoglobin Concent 32.4, Red Cell Distribution Width 13.5, Platelet 

Count 285, Mean Platelet Volume 7.4, Neutrophils (%) (Auto) 76.9H, Lymphocytes (

%) (Auto) 12.8L, Monocytes (%) (Auto) 9.6, Eosinophils (%) (Auto) 0.1, 

Basophils (%) (Auto) 0.6


Height (Feet):  5


Height (Inches):  5.00


Weight (Pounds):  150


Objective


General Appearance:  no apparent distress, alert


Neck:  non-tender, normal alignment


Cardiovascular:  normal rate, regular rhythm


Respiratory/Chest:  decreased breath sounds


Abdomen:  non tender, soft


Extremities:  non-tender


Edema:  no edema noted Generalized


Neurologic:  alert, oriented x 3


Skin:  normal pigmentation











Meek Marte Apr 29, 2020 16:44

## 2020-04-29 NOTE — PSYCH CONSULT PROGRESS NOTE
Psychiatry Progress Note


Psychiatry Progress Note


Medications





Current Medications








 Medications


  (Trade)  Dose


 Ordered  Sig/Eliazar


 Route


 PRN Reason  Start Time


 Stop Time Status Last Admin


Dose Admin


 


 Acetaminophen


  (Tylenol)  500 mg  Q6H  PRN


 ORAL


 Temp >100.5  4/15/20 01:00


 5/15/20 00:59  4/21/20 10:34


 


 


 Acetaminophen


  (Tylenol)  650 mg  Q6H  PRN


 ORAL


 For Pain  4/24/20 16:01


 5/24/20 16:00  4/24/20 16:28


 


 


 Aspirin


  (Ecotrin)  81 mg  DAILY


 ORAL


   4/26/20 09:00


 6/10/20 08:59  4/28/20 09:36


 


 


 Atorvastatin


 Calcium


  (Lipitor)  20 mg  BEDTIME


 ORAL


   4/26/20 21:00


 7/25/20 20:59  4/28/20 21:30


 


 


 Cinacalcet


  (Sensipar)  30 mg  DAILY


 ORAL


   4/15/20 09:00


 7/14/20 08:59  4/28/20 09:36


 


 


 Dextrose


  (Dextrose 50%)  25 ml  Q30M  PRN


 IV


 Hypoglycemia  4/15/20 01:15


 7/14/20 01:14   


 


 


 Dextrose


  (Dextrose 50%)  50 ml  Q30M  PRN


 IV


 Hypoglycemia  4/15/20 01:15


 7/14/20 01:14   


 


 


 Enoxaparin Sodium


  (Lovenox)  30 mg  DAILY


 SUBQ


   4/21/20 09:00


 7/20/20 08:59  4/28/20 09:40


 


 


 Hydralazine HCl


  (Apresoline)  25 mg  Q4H  PRN


 ORAL


 bp over 160 syst  4/14/20 19:45


 7/13/20 19:44   


 


 


 Insulin Aspart


  (NovoLOG)    BEFORE MEALS AND  HS


 SUBQ


   4/15/20 06:30


 7/14/20 06:29  4/26/20 16:34


 


 


 Metoprolol


 Succinate


  (Toprol XL)  50 mg  DAILY


 ORAL


   4/26/20 09:00


 7/25/20 08:59  4/28/20 09:35


 


 


 Pantoprazole


  (Protonix)  40 mg  DAILY


 ORAL


   4/28/20 09:00


 5/28/20 08:59  4/28/20 09:36


 


 


 Sevelamer


 Carbonate


  (Renvela)  800 mg  THREE TIMES A  DAY


 ORAL


   4/28/20 18:00


 7/27/20 17:59  4/28/20 17:57


 


 


 Vitamin B Complex/


 Vit C/Folic Acid


  (Nephrovite)  1 tab  DAILY


 ORAL


   4/15/20 09:00


 5/15/20 08:59  4/28/20 09:37


 








Allergies:  


Coded Allergies:  


     No Known Allergies (Unverified , 4/16/13)





Objective Data


Height (Feet):  5


Height (Inches):  5.00


Weight (Pounds):  150





Assessment/Plan


Status:  progressing











Eduard Alex MD Apr 29, 2020 00:30

## 2020-04-29 NOTE — NUR
NURSE NOTES:

Patient AOx4. No s/sx of distress or pain. RR even and unlabored on 2L NC Side rails upx2, 
call light within reach, bed low and locked. Will continue to monitor.

## 2020-04-29 NOTE — NUR
HAND-OFF: 

Report given to Samia DAN. Patient stable. Ready for discharge. Packet completed. Belongings 
with patient including android. Cardiac monitor removed. Endorsed that MRSA swabs need to be 
collected. Nephew notified of discharge.

## 2020-04-29 NOTE — NUR
NURSE NOTES:

Pt has been discharged, all belongings checked. Report given to [facility] EMT Shane. 
Pt in stable condition. Pt to [facility].

## 2020-04-30 NOTE — PROGRESS NOTE
DATE:  04/29/2020

SUBJECTIVE:  Patient is in bed, no acute distress noted.  Patient is having

anxiety.  Going to be discharged today.



MENTAL STATUS EXAMINATION:  Patient is awake, disoriented.  Mood is

neutral.  Affect is flat.  Thought process is concrete.  Thought content,

no suicidal or homicidal ideation.



ASSESSMENT:  Stable.



PLAN:

1. Continue current care.

2. Provide the patient with reality orientation.









  ______________________________________________

  Eduard Alex M.D.





DR:  MARKEL

D:  04/29/2020 22:04

T:  04/30/2020 00:19

JOB#:  [id number]

CC:

## 2020-04-30 NOTE — DISCHARGE SUMMARY
Discharge Summary


Discharge Summary


_


DATE OF ADMISSION: 4/14/2020 





DATE OF DISCHARGE: 4/29/2020








DISCHARGED BY: Dr. Silva





REASON FOR ADMISSION: 


84 years old male with past medical history of end-stage renal disease, 

hypertension, COPD, oxygen dependent at home, presented for evaluation of 

shortness of breath.  


Paramedics were called since  patient had increased work of breathing and 

shortness of breath since earlier that morning. Patient had hemodialysis day 

prior to presentation.  


No reported fever or chills.  


No reported cough. 


He denied leg swelling.  


No chest pain.  


No nausea , vomiting or diarrhea.  


Patient's  son was  hospitalized  in ICU  for COVID-19 infection.  


Upon evaluation laboratory work-up revealed no leukocytosis ,hemoglobin 10, 

hematocrit 30.9 ,platelet count 164.


Potassium 5.2.  


BUN 34, creatinine 6.7.


Glucose 137.


Lactic acid 1.8.


AST 57, ALT 42, alkaline phosphatase 81.


Troponin 0.025, pro BNP 50385.


EKG revealed sinus rhythm with left axis deviation and bifascicular block.  


Chest x-ray demonstrated no acute cardiopulmonary pathology.  


Patient was swabbed for COVID 19.


Patient subsequently admitted for further management.   





CONSULTANTS:


cardiologist Dr. Todd


pulmonary Dr. Solomon


ID specialist Dr. Jake Pfeiffer


GI specialist Dr Kessler 


nephrologist Dr. Morrison


hematologist/oncologist Dr. Staley


psychiatrist Dr. pacheco


pain specialist Dr. Mendes





HOSPITAL COURSE: 


Patient admitted to telemetry floor.  


Supplemental oxygen provided and titrated to keep oximetry above 92%.  


Nebulizing treatment with bronchodilator provided.  


Patient started on empiric antibiotic  as per ID specialist recommendation.  


Blood cultures were negative.  


COVID-19 by PCR  on 4/14  and 4/23 was detected.  


Patient was kept on isolation.   


Patient was on room air , alternating with a low flow of oxygen via nasal 

cannula.  


Pulmonologist recommended use of Plaquenil if patient becomes hypoxic only.


Hemodialysis provided as per nephrologist recommendations with close monitoring 

of volumes ,  renal parameters and electrolytes.


 


Telemetry demonstrated trigeminy ventricular premature depolarization.  


Patient was on beta-blocker.  


No further evidence of of arrhythmia. 


Patient noted to have pulmonary edema,  likely due to COVID-19 infection.  


Chronic diastolic heart failure can contribute to some extent as well.  


Hemodialysis continued as  per nephrologist recommendations .


Antiplatelet therapy with aspirin and statin continued.  


Blood pressure was managed with  beta-blocker.   





Patient noted to have leukocytosis .


Stool for C. difficile was negative.  


Blood sugar was managed with sliding scale of insulin.  


DVT and GI prophylaxis provided.  


 


Hemoglobin  and hematocrit were closely monitored  with goal to keep hemoglobin 

above 7.  


Stool  for occult blood was negative.  


Anemia work-up was consistent with anemia of chronic disease.  


Hematologist recommended Epogen if hemoglobin drops.


Hemoglobin and hematocrit remained stable, and prior to discharge hemoglobin 

10.4, hematocrit 31.5.  





Pain management was addressed as per pain specialist recommendation.


Seizure precaution maintained.  No evidence of seizure activity while in the 

hospital.


Psychiatric medication regimen optimized as per psychiatrist  recommendation.


Reality orientation provided.  


Patient clinically stabilized and was ready for transfer back to skilled 

nursing facility for continuation of care. 











FINAL DIAGNOSES: 


Confirmed COVID-19 infection


COPD with chronic oxygen dependency at home


Dyspnea and shortness of breath


End-stage renal disease , on hemodialysis


Trigeminy ventricular premature depolarization


Pulmonary edema , likely due to COVID-19


Chronic diastolic congestive heart failure


Hypertension


Diabetes mellitus


Anemia of chronic kidney disease


History of seizure disorder


Degenerative joint disease





DISCHARGE MEDICATIONS:


See Medication Reconciliation list.





DISCHARGE INSTRUCTIONS:


Patient was discharged to the skilled nursing facility. 


Follow up with medical doctor at the facility.





I have been assigned to dictate discharge summary for this account. 


I was not involved in the patient's management.











Angie Orourke NP Apr 30, 2020 18:12

## 2021-08-23 NOTE — PULMONOLOGY PROGRESS NOTE
Assessment/Plan


Assessment/Plan


IMPRESSION:


1. History of COVID-19 exposure. Is + COVID 19 here


2. ESRD, on dialysis.


3. Diabetes mellitus.


4. Dyspnea.





DISCUSSION:  





Consider use of Plaquenil or azithromycin only if patient becomes hypoxic.


Currently SaO2 92-95% on RA alternating with 2L/min O2


Currently, he is doing well.  I would avoid unnecessary antibiotics.


Agree with ongoing dialysis.  I will follow carefully.














  ______________________________________________


  Oh Solomon M.D.





Subjective


ROS Limited/Unobtainable:  No


Interval Events:  + COVID 19; no new complaints


Constitutional:  Reports: no symptoms


HEENT:  Repors: no symptoms


Respiratory:  Reports: no symptoms


Cardiovascular:  Reports: no symptoms


Gastrointestinal/Abdominal:  Reports: no symptoms


Allergies:  


Coded Allergies:  


     No Known Allergies (Unverified , 4/16/13)





Objective





Last 24 Hour Vital Signs








  Date Time  Temp Pulse Resp B/P (MAP) Pulse Ox O2 Delivery O2 Flow Rate FiO2


 


4/29/20 08:08      Nasal Cannula 2.0 


 


4/29/20 04:00  61      


 


4/29/20 04:00 98.2 69 19 102/69 (80) 94   


 


4/29/20 00:00 98.0 69 19 103/43 (63) 95   


 


4/29/20 00:00  66      


 


4/28/20 21:00      Nasal Cannula 2.0 


 


4/28/20 20:00 98.0 70 18 117/56 (76) 94   


 


4/28/20 20:00  78      


 


4/28/20 16:00 96.7 86 19 113/69 (84) 98   


 


4/28/20 12:00  69      


 


4/28/20 12:00 97.5 71 18 110/59 (76) 96   

















Intake and Output  


 


 4/28/20 4/29/20





 19:00 07:00


 


Intake Total 1200 ml 


 


Balance 1200 ml 


 


  


 


Intake Oral 1200 ml 


 


# Voids  1








General Appearance:  no acute distress


HEENT:  mucous membranes moist


Respiratory/Chest:  chest wall non-tender, lungs clear


Cardiovascular:  normal peripheral pulses


Abdomen:  soft, non tender


Extremities:  no edema


Neurologic/Psychiatric:  alert, responsive





Microbiology








 Date/Time


Source Procedure


Growth Status


 


 


 4/27/20 14:45


Stool Stool Culture


Pending Resulted


 


 4/27/20 14:45


Stool Clostridium difficile Toxin Assay - Final Resulted








Laboratory Tests


4/29/20 04:35: 


White Blood Count 15.1H, Red Blood Count 3.29L, Hemoglobin 9.3L, Hematocrit 

28.6L, Mean Corpuscular Volume 87, Mean Corpuscular Hemoglobin 28.2, Mean 

Corpuscular Hemoglobin Concent 32.4, Red Cell Distribution Width 13.5, Platelet 

Count 285, Mean Platelet Volume 7.4, Neutrophils (%) (Auto) 76.9H, Lymphocytes (

%) (Auto) 12.8L, Monocytes (%) (Auto) 9.6, Eosinophils (%) (Auto) 0.1, 

Basophils (%) (Auto) 0.6





Current Medications








 Medications


  (Trade)  Dose


 Ordered  Sig/Eliazar


 Route


 PRN Reason  Start Time


 Stop Time Status Last Admin


Dose Admin


 


 Acetaminophen


  (Tylenol)  500 mg  Q6H  PRN


 ORAL


 Temp >100.5  4/15/20 01:00


 5/15/20 00:59  4/21/20 10:34


 


 


 Acetaminophen


  (Tylenol)  650 mg  Q6H  PRN


 ORAL


 For Pain  4/24/20 16:01


 5/24/20 16:00  4/24/20 16:28


 


 


 Aspirin


  (Ecotrin)  81 mg  DAILY


 ORAL


   4/26/20 09:00


 6/10/20 08:59  4/28/20 09:36


 


 


 Atorvastatin


 Calcium


  (Lipitor)  20 mg  BEDTIME


 ORAL


   4/26/20 21:00


 7/25/20 20:59  4/28/20 21:30


 


 


 Cinacalcet


  (Sensipar)  30 mg  DAILY


 ORAL


   4/15/20 09:00


 7/14/20 08:59  4/29/20 09:22


 


 


 Dextrose


  (Dextrose 50%)  25 ml  Q30M  PRN


 IV


 Hypoglycemia  4/15/20 01:15


 7/14/20 01:14   


 


 


 Dextrose


  (Dextrose 50%)  50 ml  Q30M  PRN


 IV


 Hypoglycemia  4/15/20 01:15


 7/14/20 01:14   


 


 


 Enoxaparin Sodium


  (Lovenox)  30 mg  DAILY


 SUBQ


   4/21/20 09:00


 7/20/20 08:59  4/28/20 09:40


 


 


 Hydralazine HCl


  (Apresoline)  25 mg  Q4H  PRN


 ORAL


 bp over 160 syst  4/14/20 19:45


 7/13/20 19:44   


 


 


 Insulin Aspart


  (NovoLOG)    BEFORE MEALS AND  HS


 SUBQ


   4/15/20 06:30


 7/14/20 06:29  4/26/20 16:34


 


 


 Metoprolol


 Succinate


  (Toprol XL)  50 mg  DAILY


 ORAL


   4/26/20 09:00


 7/25/20 08:59  4/28/20 09:35


 


 


 Pantoprazole


  (Protonix)  40 mg  DAILY


 ORAL


   4/28/20 09:00


 5/28/20 08:59  4/29/20 09:22


 


 


 Sevelamer


 Carbonate


  (Renvela)  800 mg  THREE TIMES A  DAY


 ORAL


   4/28/20 18:00


 7/27/20 17:59  4/29/20 09:22


 


 


 Vitamin B Complex/


 Vit C/Folic Acid


  (Nephrovite)  1 tab  DAILY


 ORAL


   4/15/20 09:00


 5/15/20 08:59  4/29/20 09:23


 

















Oh Solomon MD Apr 29, 2020 10:26 Spoke with pt's nurse about the Hospice Consult. She states that pt's daughter is the decision maker d/t pt is unable to make decisions. A Hospice consult has been ordered. CM will make the Hospice referral after the Dr discusses the need for Hospice with pt's daughter. Pt was discharged with the Pleurx Cath on 08/14 to home with daughter and [facility]. Pt's nurse informed CM that pt's daughter works nights and sleeps until 1700. Unsure of d/c plan at this time. JERRY will discuss d/c plan with daughter after  discusses [facility] with her.    TE